# Patient Record
Sex: FEMALE | Race: BLACK OR AFRICAN AMERICAN | NOT HISPANIC OR LATINO | Employment: UNEMPLOYED | URBAN - METROPOLITAN AREA
[De-identification: names, ages, dates, MRNs, and addresses within clinical notes are randomized per-mention and may not be internally consistent; named-entity substitution may affect disease eponyms.]

---

## 2020-03-06 ENCOUNTER — HOSPITAL ENCOUNTER (OUTPATIENT)
Facility: HOSPITAL | Age: 20
Discharge: HOME | End: 2020-03-07
Attending: FAMILY MEDICINE | Admitting: INTERNAL MEDICINE
Payer: OTHER GOVERNMENT

## 2020-03-06 DIAGNOSIS — D64.9 SYMPTOMATIC ANEMIA: Primary | ICD-10-CM

## 2020-03-06 DIAGNOSIS — M32.9 LUPUS: ICD-10-CM

## 2020-03-06 DIAGNOSIS — M79.10 MYALGIA: ICD-10-CM

## 2020-03-06 PROBLEM — E87.6 HYPOKALEMIA: Status: ACTIVE | Noted: 2020-03-06

## 2020-03-06 PROBLEM — L40.9 PSORIASIS OF SCALP: Status: ACTIVE | Noted: 2020-03-06

## 2020-03-06 LAB
ABO + RH BLD: NORMAL
ALBUMIN SERPL BCP-MCNC: 3 G/DL (ref 3.5–5.2)
ALP SERPL-CCNC: 79 U/L (ref 55–135)
ALT SERPL W/O P-5'-P-CCNC: 20 U/L (ref 10–44)
ANION GAP SERPL CALC-SCNC: 12 MMOL/L (ref 8–16)
AST SERPL-CCNC: 48 U/L (ref 10–40)
B-HCG UR QL: NEGATIVE
BASOPHILS # BLD AUTO: 0.01 K/UL (ref 0–0.2)
BASOPHILS NFR BLD: 0.3 % (ref 0–1.9)
BILIRUB SERPL-MCNC: 0.2 MG/DL (ref 0.1–1)
BILIRUB UR QL STRIP: NEGATIVE
BLD GP AB SCN CELLS X3 SERPL QL: NORMAL
BLD PROD TYP BPU: NORMAL
BLOOD UNIT EXPIRATION DATE: NORMAL
BLOOD UNIT TYPE CODE: 5100
BLOOD UNIT TYPE: NORMAL
BUN SERPL-MCNC: 7 MG/DL (ref 6–20)
CALCIUM SERPL-MCNC: 8.9 MG/DL (ref 8.7–10.5)
CHLORIDE SERPL-SCNC: 104 MMOL/L (ref 95–110)
CLARITY UR: CLEAR
CO2 SERPL-SCNC: 23 MMOL/L (ref 23–29)
CODING SYSTEM: NORMAL
COLOR UR: YELLOW
CREAT SERPL-MCNC: 0.6 MG/DL (ref 0.5–1.4)
CRP SERPL-MCNC: 15.8 MG/L (ref 0–8.2)
DAT IGG-SP REAG RBC-IMP: NORMAL
DIFFERENTIAL METHOD: ABNORMAL
DISPENSE STATUS: NORMAL
EOSINOPHIL # BLD AUTO: 0 K/UL (ref 0–0.5)
EOSINOPHIL NFR BLD: 0 % (ref 0–8)
ERYTHROCYTE [DISTWIDTH] IN BLOOD BY AUTOMATED COUNT: 14.1 % (ref 11.5–14.5)
ERYTHROCYTE [SEDIMENTATION RATE] IN BLOOD BY WESTERGREN METHOD: 108 MM/HR (ref 0–20)
EST. GFR  (AFRICAN AMERICAN): >60 ML/MIN/1.73 M^2
EST. GFR  (NON AFRICAN AMERICAN): >60 ML/MIN/1.73 M^2
GLUCOSE SERPL-MCNC: 97 MG/DL (ref 70–110)
GLUCOSE UR QL STRIP: NEGATIVE
HCT VFR BLD AUTO: 25.7 % (ref 37–48.5)
HGB BLD-MCNC: 7.7 G/DL (ref 12–16)
HGB UR QL STRIP: NEGATIVE
HIV 1+2 AB+HIV1 P24 AG SERPL QL IA: NEGATIVE
IMM GRANULOCYTES # BLD AUTO: 0.03 K/UL (ref 0–0.04)
IMM GRANULOCYTES NFR BLD AUTO: 0.8 % (ref 0–0.5)
INFLUENZA A, MOLECULAR: NEGATIVE
INFLUENZA B, MOLECULAR: NEGATIVE
KETONES UR QL STRIP: NEGATIVE
LACTATE SERPL-SCNC: 0.8 MMOL/L (ref 0.5–2.2)
LEUKOCYTE ESTERASE UR QL STRIP: NEGATIVE
LYMPHOCYTES # BLD AUTO: 0.8 K/UL (ref 1–4.8)
LYMPHOCYTES NFR BLD: 22.3 % (ref 18–48)
MCH RBC QN AUTO: 27.6 PG (ref 27–31)
MCHC RBC AUTO-ENTMCNC: 30 G/DL (ref 32–36)
MCV RBC AUTO: 92 FL (ref 82–98)
MONOCYTES # BLD AUTO: 0.2 K/UL (ref 0.3–1)
MONOCYTES NFR BLD: 4 % (ref 4–15)
NEUTROPHILS # BLD AUTO: 2.7 K/UL (ref 1.8–7.7)
NEUTROPHILS NFR BLD: 72.6 % (ref 38–73)
NITRITE UR QL STRIP: NEGATIVE
NRBC BLD-RTO: 0 /100 WBC
NUM UNITS TRANS PACKED RBC: NORMAL
PH UR STRIP: 7 [PH] (ref 5–8)
PLATELET # BLD AUTO: 356 K/UL (ref 150–350)
PMV BLD AUTO: 9.7 FL (ref 9.2–12.9)
POTASSIUM SERPL-SCNC: 3.3 MMOL/L (ref 3.5–5.1)
PROCALCITONIN SERPL IA-MCNC: 0.22 NG/ML
PROT SERPL-MCNC: 8.1 G/DL (ref 6–8.4)
PROT UR QL STRIP: NEGATIVE
RBC # BLD AUTO: 2.79 M/UL (ref 4–5.4)
RETICS/RBC NFR AUTO: 1.2 % (ref 0.5–2.5)
SODIUM SERPL-SCNC: 139 MMOL/L (ref 136–145)
SP GR UR STRIP: 1.02 (ref 1–1.03)
SPECIMEN SOURCE: NORMAL
TSH SERPL DL<=0.005 MIU/L-ACNC: 2.55 UIU/ML (ref 0.4–4)
URN SPEC COLLECT METH UR: ABNORMAL
UROBILINOGEN UR STRIP-ACNC: ABNORMAL EU/DL
WBC # BLD AUTO: 3.73 K/UL (ref 3.9–12.7)

## 2020-03-06 PROCEDURE — 85651 RBC SED RATE NONAUTOMATED: CPT

## 2020-03-06 PROCEDURE — 81003 URINALYSIS AUTO W/O SCOPE: CPT

## 2020-03-06 PROCEDURE — 86038 ANTINUCLEAR ANTIBODIES: CPT

## 2020-03-06 PROCEDURE — G0378 HOSPITAL OBSERVATION PER HR: HCPCS

## 2020-03-06 PROCEDURE — 99291 CRITICAL CARE FIRST HOUR: CPT

## 2020-03-06 PROCEDURE — 36430 TRANSFUSION BLD/BLD COMPNT: CPT

## 2020-03-06 PROCEDURE — 86920 COMPATIBILITY TEST SPIN: CPT

## 2020-03-06 PROCEDURE — 86880 COOMBS TEST DIRECT: CPT

## 2020-03-06 PROCEDURE — 87502 INFLUENZA DNA AMP PROBE: CPT

## 2020-03-06 PROCEDURE — 86141 C-REACTIVE PROTEIN HS: CPT

## 2020-03-06 PROCEDURE — 85025 COMPLETE CBC W/AUTO DIFF WBC: CPT

## 2020-03-06 PROCEDURE — 87040 BLOOD CULTURE FOR BACTERIA: CPT | Mod: 59

## 2020-03-06 PROCEDURE — 36415 COLL VENOUS BLD VENIPUNCTURE: CPT

## 2020-03-06 PROCEDURE — 96361 HYDRATE IV INFUSION ADD-ON: CPT | Performed by: INTERNAL MEDICINE

## 2020-03-06 PROCEDURE — 86703 HIV-1/HIV-2 1 RESULT ANTBDY: CPT

## 2020-03-06 PROCEDURE — 86160 COMPLEMENT ANTIGEN: CPT

## 2020-03-06 PROCEDURE — 86160 COMPLEMENT ANTIGEN: CPT | Mod: 59

## 2020-03-06 PROCEDURE — 85045 AUTOMATED RETICULOCYTE COUNT: CPT

## 2020-03-06 PROCEDURE — 83605 ASSAY OF LACTIC ACID: CPT

## 2020-03-06 PROCEDURE — 63600175 PHARM REV CODE 636 W HCPCS: Performed by: INTERNAL MEDICINE

## 2020-03-06 PROCEDURE — 86161 COMPLEMENT/FUNCTION ACTIVITY: CPT

## 2020-03-06 PROCEDURE — 81025 URINE PREGNANCY TEST: CPT

## 2020-03-06 PROCEDURE — 86140 C-REACTIVE PROTEIN: CPT

## 2020-03-06 PROCEDURE — 83010 ASSAY OF HAPTOGLOBIN QUANT: CPT

## 2020-03-06 PROCEDURE — 96360 HYDRATION IV INFUSION INIT: CPT | Performed by: INTERNAL MEDICINE

## 2020-03-06 PROCEDURE — 80053 COMPREHEN METABOLIC PANEL: CPT

## 2020-03-06 PROCEDURE — 86850 RBC ANTIBODY SCREEN: CPT

## 2020-03-06 PROCEDURE — 86235 NUCLEAR ANTIGEN ANTIBODY: CPT | Mod: 59

## 2020-03-06 PROCEDURE — 86039 ANTINUCLEAR ANTIBODIES (ANA): CPT

## 2020-03-06 PROCEDURE — 84145 PROCALCITONIN (PCT): CPT

## 2020-03-06 PROCEDURE — 84443 ASSAY THYROID STIM HORMONE: CPT

## 2020-03-06 PROCEDURE — 86161 COMPLEMENT/FUNCTION ACTIVITY: CPT | Mod: 59

## 2020-03-06 PROCEDURE — P9016 RBC LEUKOCYTES REDUCED: HCPCS

## 2020-03-06 RX ORDER — DIPHENHYDRAMINE HCL 25 MG
25 CAPSULE ORAL EVERY 6 HOURS PRN
Status: DISCONTINUED | OUTPATIENT
Start: 2020-03-06 | End: 2020-03-07 | Stop reason: HOSPADM

## 2020-03-06 RX ORDER — IPRATROPIUM BROMIDE AND ALBUTEROL SULFATE 2.5; .5 MG/3ML; MG/3ML
3 SOLUTION RESPIRATORY (INHALATION) EVERY 4 HOURS PRN
Status: DISCONTINUED | OUTPATIENT
Start: 2020-03-06 | End: 2020-03-07 | Stop reason: HOSPADM

## 2020-03-06 RX ORDER — MAG HYDROX/ALUMINUM HYD/SIMETH 200-200-20
30 SUSPENSION, ORAL (FINAL DOSE FORM) ORAL EVERY 6 HOURS PRN
Status: DISCONTINUED | OUTPATIENT
Start: 2020-03-06 | End: 2020-03-07 | Stop reason: HOSPADM

## 2020-03-06 RX ORDER — SODIUM CHLORIDE 9 MG/ML
INJECTION, SOLUTION INTRAVENOUS CONTINUOUS
Status: DISCONTINUED | OUTPATIENT
Start: 2020-03-06 | End: 2020-03-07 | Stop reason: HOSPADM

## 2020-03-06 RX ORDER — GUAIFENESIN 100 MG/5ML
200 SOLUTION ORAL EVERY 4 HOURS PRN
Status: DISCONTINUED | OUTPATIENT
Start: 2020-03-06 | End: 2020-03-07 | Stop reason: HOSPADM

## 2020-03-06 RX ORDER — ACETAMINOPHEN 325 MG/1
650 TABLET ORAL EVERY 6 HOURS PRN
Status: DISCONTINUED | OUTPATIENT
Start: 2020-03-06 | End: 2020-03-07 | Stop reason: HOSPADM

## 2020-03-06 RX ORDER — SODIUM CHLORIDE 0.9 % (FLUSH) 0.9 %
10 SYRINGE (ML) INJECTION
Status: DISCONTINUED | OUTPATIENT
Start: 2020-03-06 | End: 2020-03-07 | Stop reason: HOSPADM

## 2020-03-06 RX ORDER — ONDANSETRON 2 MG/ML
4 INJECTION INTRAMUSCULAR; INTRAVENOUS EVERY 8 HOURS PRN
Status: DISCONTINUED | OUTPATIENT
Start: 2020-03-06 | End: 2020-03-07 | Stop reason: HOSPADM

## 2020-03-06 RX ORDER — HYDROCODONE BITARTRATE AND ACETAMINOPHEN 500; 5 MG/1; MG/1
TABLET ORAL
Status: DISCONTINUED | OUTPATIENT
Start: 2020-03-06 | End: 2020-03-07 | Stop reason: HOSPADM

## 2020-03-06 RX ADMIN — SODIUM CHLORIDE: 0.9 INJECTION, SOLUTION INTRAVENOUS at 10:03

## 2020-03-06 NOTE — ED PROVIDER NOTES
3/6/2020, 5:03 PM   History obtained from the patient      History of Present Illness: Dilcia Reed is a 19 y.o. female patient with PMHx of lupus who presents to the Emergency Department for fever, body aches, rash and hair loss which onset 3 weeks ago.    5:04 PM: Pt understands they will be seen and dispositioned by the next available physician. Pt voices understanding and agrees with plan. Pt also understands the longer than normal wait time. I am removing myself from the care of pt and pt will now be assigned to the next available physician.     ANTHONY Arvizu FNP-C    SCRIBE #1 NOTE: I, Vira Moss, am scribing for, and in the presence of, Belia Diehl MD. I have scribed the entire note.       History     Chief Complaint   Patient presents with    Generalized Body Aches     c/o body aches, at home fever, diffused rash, for several weeks,  patient states she was diag with Lupus at one time and then taken off the meds      Review of patient's allergies indicates:   Allergen Reactions    Sulfamethoxazole-trimethoprim Itching     syncopal episode as a child         History of Present Illness     HPI    3/6/2020, 7:02 PM  History obtained from the patient and mother      History of Present Illness: Dilcia Reed is a 19 y.o. female patient with a PMHx of Lupus who presents to the Emergency Department for evaluation of a possible lupus flare-up which onset gradually over the last few weeks. Pt reports that 6/23/19 she was diagnosed with Lupus and hospitalized in [place]. Per mother, pt recently moved to  and her medication (plaquenil) was discontinued in  because her doctor wanted to retest the pt and do their own studies. Pt states over the last few weeks her hair began to fall out and she developed a rash on her face which is new. Mother reports that the pt's Hemoglobin has slowly decreased since the beginning of this year (Hemoglobin of 10.2 (1/8/20) and 8.8 (2/5/20})). Mother also states that the pt  has very low iron. Pt's sister was dx with Lupus and exhibits similar sx. Symptoms are constant and moderate in severity. No mitigating or exacerbating factors reported. Associated sxs include generalized arthralgias/ myalgias, facial rash, hair loss, and fever (Tmax 103 F). Patient denies any chills, SOB, cough, CP, palpations, numbness, HA, dizziness, rash, wound, abdominal pain, n/v/d, back/ neck pain, sore throat, congestion, dysuria, hematuria, localized weakness, easily bruising, and all other sxs at this time. No prior tx reported. No further complaints or concerns at this time.     Arrival mode: Personal vehicle      PCP: Primary Doctor No        Past Medical History:  Past Medical History:   Diagnosis Date    Lupus        Past Surgical History:  History reviewed. No pertinent surgical history.      Family History:  Family History   Problem Relation Age of Onset    Lupus Sister        Social History:  Social History     Tobacco Use    Smoking status: Never Smoker   Substance and Sexual Activity    Alcohol use: Never     Frequency: Never    Drug use: Never    Sexual activity: Unknown         Review of Systems     Review of Systems   Constitutional: Positive for fever (Tmax 103 F). Negative for chills.        + Hair loss   HENT: Negative for congestion and sore throat.    Respiratory: Negative for cough and shortness of breath.    Cardiovascular: Negative for chest pain and palpitations.   Gastrointestinal: Negative for abdominal pain, diarrhea, nausea and vomiting.   Genitourinary: Negative for dysuria and hematuria.   Musculoskeletal: Positive for arthralgias (generalized) and myalgias (generalized). Negative for back pain and neck pain.   Skin: Positive for rash (facial). Negative for wound.   Neurological: Negative for dizziness, weakness, numbness and headaches.   Hematological: Does not bruise/bleed easily.   All other systems reviewed and are negative.     Physical Exam     Initial Vitals  [03/06/20 1623]   BP Pulse Resp Temp SpO2   127/73 (!) 112 20 98.8 °F (37.1 °C) 98 %      MAP       --          Physical Exam  Nursing Notes and Vital Signs Reviewed.  Constitutional: Patient is in no acute distress. Well-developed and well-nourished.  Head: Atraumatic. Normocephalic.  Eyes: PERRL. EOM intact. Conjunctivae are not pale. No scleral icterus.  ENT: Mucous membranes are moist. Oropharynx is clear and symmetric.    Neck: Supple. Full ROM. No lymphadenopathy.  Cardiovascular: Tachycardia. Regular rhythm. No murmurs, rubs, or gallops. Distal pulses are 2+ and symmetric.  Pulmonary/Chest: No respiratory distress. Clear to auscultation bilaterally. No wheezing or rales.  Abdominal: Soft and non-distended.  There is no tenderness.  No rebound, guarding, or rigidity. Good bowel sounds.  Genitourinary: No CVA tenderness.  Musculoskeletal: Moves all extremities. No obvious deformities. No edema. No calf tenderness.  Skin: Warm and dry. Desquamation in malar distrubution of face.   Neurological:  Alert, awake, and appropriate.  Normal speech.  No acute focal neurological deficits are appreciated.  Psychiatric: Normal affect. Good eye contact. Appropriate in content.     ED Course   Critical Care  Date/Time: 3/8/2020 3:52 PM  Performed by: Belia Diehl MD  Authorized by: Arvin Arevalo MD   Direct patient critical care time: 15 minutes  Additional history critical care time: 5 minutes  Ordering / reviewing critical care time: 5 minutes  Documentation critical care time: 5 minutes  Consulting other physicians critical care time: 5 minutes  Consult with family critical care time: 5 minutes  Other critical care time: 5 minutes  Total critical care time (exclusive of procedural time) : 45 minutes  Critical care time was exclusive of separately billable procedures and treating other patients and teaching time.  Critical care was necessary to treat or prevent imminent or life-threatening deterioration of the  "following conditions: Symptomatic anemia.  Critical care was time spent personally by me on the following activities: development of treatment plan with patient or surrogate, discussions with consultants, interpretation of cardiac output measurements, obtaining history from patient or surrogate, ordering and performing treatments and interventions, examination of patient, evaluation of patient's response to treatment, ordering and review of laboratory studies, ordering and review of radiographic studies, pulse oximetry, re-evaluation of patient's condition and review of old charts.        ED Vital Signs:  Vitals:    03/06/20 1623 03/06/20 2022 03/06/20 2216 03/06/20 2249   BP: 127/73 116/73 132/77 121/66   Pulse: (!) 112 (!) 113 (!) 113 107   Resp: 20 16 18   Temp: 98.8 °F (37.1 °C) 98.6 °F (37 °C) 98.1 °F (36.7 °C) 99.3 °F (37.4 °C)   TempSrc: Oral Oral Oral Oral   SpO2: 98% 95% 98% 99%   Weight: 67.8 kg (149 lb 5.8 oz)  72.3 kg (159 lb 6.3 oz)    Height:   5' 3" (1.6 m)     03/06/20 2305 03/07/20 0112 03/07/20 0132 03/07/20 0156   BP: 133/84 128/80 121/76 119/72   Pulse: (!) 116 108 107 105   Resp: 18 18 16 16   Temp: 98.3 °F (36.8 °C) 98.7 °F (37.1 °C) 98 °F (36.7 °C) 98.3 °F (36.8 °C)   TempSrc: Oral Oral Oral Oral   SpO2: 98% 99% 100% 98%   Weight:       Height:        03/07/20 0411 03/07/20 0801 03/07/20 1234   BP: (!) 114/59 118/73 111/65   Pulse: 92 94 107   Resp: 18 16 18   Temp: 98 °F (36.7 °C) 97.9 °F (36.6 °C) 98.1 °F (36.7 °C)   TempSrc: Oral Oral Oral   SpO2: 100% 100% 98%   Weight: 72 kg (158 lb 11.7 oz)     Height:          Abnormal Lab Results:  Labs Reviewed   CBC W/ AUTO DIFFERENTIAL - Abnormal; Notable for the following components:       Result Value    WBC 3.73 (*)     RBC 2.79 (*)     Hemoglobin 7.7 (*)     Hematocrit 25.7 (*)     Mean Corpuscular Hemoglobin Conc 30.0 (*)     Platelets 356 (*)     Immature Granulocytes 0.8 (*)     Lymph # 0.8 (*)     Mono # 0.2 (*)     All other components " within normal limits   COMPREHENSIVE METABOLIC PANEL - Abnormal; Notable for the following components:    Potassium 3.3 (*)     Albumin 3.0 (*)     AST 48 (*)     All other components within normal limits   URINALYSIS, REFLEX TO URINE CULTURE - Abnormal; Notable for the following components:    Urobilinogen, UA 2.0-3.0 (*)     All other components within normal limits    Narrative:     Preferred Collection Type->Urine, Clean Catch   INFLUENZA A & B BY MOLECULAR   HIV 1 / 2 ANTIBODY   PREGNANCY TEST, URINE RAPID   PROCALCITONIN   LACTIC ACID, PLASMA   TSH   TSH   C5 COMPLEMENT   RETICULOCYTES   SEDIMENTATION RATE   HIGH SENSITIVITY CRP   RETICULOCYTES   TYPE & SCREEN        All Lab Results:  Results for orders placed or performed during the hospital encounter of 03/06/20   Influenza A & B by Molecular   Result Value Ref Range    Influenza A, Molecular Negative Negative    Influenza B, Molecular Negative Negative    Flu A & B Source Nasal swab    Blood culture #1 **CANNOT BE ORDERED STAT**   Result Value Ref Range    Blood Culture, Routine No Growth to date     Blood Culture, Routine No Growth to date    Blood culture #2 **CANNOT BE ORDERED STAT**   Result Value Ref Range    Blood Culture, Routine No Growth to date     Blood Culture, Routine No Growth to date    HIV 1/2 Ag/Ab (4th Gen)   Result Value Ref Range    HIV 1/2 Ag/Ab Negative Negative   CBC auto differential   Result Value Ref Range    WBC 3.73 (L) 3.90 - 12.70 K/uL    RBC 2.79 (L) 4.00 - 5.40 M/uL    Hemoglobin 7.7 (L) 12.0 - 16.0 g/dL    Hematocrit 25.7 (L) 37.0 - 48.5 %    Mean Corpuscular Volume 92 82 - 98 fL    Mean Corpuscular Hemoglobin 27.6 27.0 - 31.0 pg    Mean Corpuscular Hemoglobin Conc 30.0 (L) 32.0 - 36.0 g/dL    RDW 14.1 11.5 - 14.5 %    Platelets 356 (H) 150 - 350 K/uL    MPV 9.7 9.2 - 12.9 fL    Immature Granulocytes 0.8 (H) 0.0 - 0.5 %    Gran # (ANC) 2.7 1.8 - 7.7 K/uL    Immature Grans (Abs) 0.03 0.00 - 0.04 K/uL    Lymph # 0.8 (L) 1.0 -  4.8 K/uL    Mono # 0.2 (L) 0.3 - 1.0 K/uL    Eos # 0.0 0.0 - 0.5 K/uL    Baso # 0.01 0.00 - 0.20 K/uL    nRBC 0 0 /100 WBC    Gran% 72.6 38.0 - 73.0 %    Lymph% 22.3 18.0 - 48.0 %    Mono% 4.0 4.0 - 15.0 %    Eosinophil% 0.0 0.0 - 8.0 %    Basophil% 0.3 0.0 - 1.9 %    Differential Method Automated    Comprehensive metabolic panel   Result Value Ref Range    Sodium 139 136 - 145 mmol/L    Potassium 3.3 (L) 3.5 - 5.1 mmol/L    Chloride 104 95 - 110 mmol/L    CO2 23 23 - 29 mmol/L    Glucose 97 70 - 110 mg/dL    BUN, Bld 7 6 - 20 mg/dL    Creatinine 0.6 0.5 - 1.4 mg/dL    Calcium 8.9 8.7 - 10.5 mg/dL    Total Protein 8.1 6.0 - 8.4 g/dL    Albumin 3.0 (L) 3.5 - 5.2 g/dL    Total Bilirubin 0.2 0.1 - 1.0 mg/dL    Alkaline Phosphatase 79 55 - 135 U/L    AST 48 (H) 10 - 40 U/L    ALT 20 10 - 44 U/L    Anion Gap 12 8 - 16 mmol/L    eGFR if African American >60 >60 mL/min/1.73 m^2    eGFR if non African American >60 >60 mL/min/1.73 m^2   Urinalysis, Reflex to Urine Culture Urine, Clean Catch   Result Value Ref Range    Specimen UA Urine, Clean Catch     Color, UA Yellow Yellow, Straw, [place]    Appearance, UA Clear Clear    pH, UA 7.0 5.0 - 8.0    Specific Gravity, UA 1.020 1.005 - 1.030    Protein, UA Negative Negative    Glucose, UA Negative Negative    Ketones, UA Negative Negative    Bilirubin (UA) Negative Negative    Occult Blood UA Negative Negative    Nitrite, UA Negative Negative    Urobilinogen, UA 2.0-3.0 (A) <2.0 EU/dL    Leukocytes, UA Negative Negative   Rapid Pregnancy, Urine   Result Value Ref Range    Preg Test, Ur Negative    Procalcitonin   Result Value Ref Range    Procalcitonin 0.22 <0.25 ng/mL   Lactic acid, plasma   Result Value Ref Range    Lactate (Lactic Acid) 0.8 0.5 - 2.2 mmol/L   TSH   Result Value Ref Range    TSH 2.546 0.400 - 4.000 uIU/mL   C-reactive protein   Result Value Ref Range    CRP 15.8 (H) 0.0 - 8.2 mg/L   Haptoglobin   Result Value Ref Range    Haptoglobin 288 (H) 30 - 250 mg/dL   C3  complement   Result Value Ref Range    Complement (C-3) 42 (L) 50 - 180 mg/dL   C4 complement   Result Value Ref Range    Complement (C-4) 13 11 - 44 mg/dL   High sensitivity CRP (Cardiac CRP)   Result Value Ref Range    CRP, High Sensitivity 16.65 (H) 0.00 - 3.19 mg/L   Sedimentation rate   Result Value Ref Range    Sed Rate 108 (H) 0 - 20 mm/Hr   Reticulocytes   Result Value Ref Range    Retic 1.2 0.5 - 2.5 %   CBC auto differential   Result Value Ref Range    WBC 3.93 3.90 - 12.70 K/uL    RBC 3.09 (L) 4.00 - 5.40 M/uL    Hemoglobin 8.6 (L) 12.0 - 16.0 g/dL    Hematocrit 28.5 (L) 37.0 - 48.5 %    Mean Corpuscular Volume 92 82 - 98 fL    Mean Corpuscular Hemoglobin 27.8 27.0 - 31.0 pg    Mean Corpuscular Hemoglobin Conc 30.2 (L) 32.0 - 36.0 g/dL    RDW 14.3 11.5 - 14.5 %    Platelets 331 150 - 350 K/uL    MPV 10.7 9.2 - 12.9 fL    Immature Granulocytes 0.8 (H) 0.0 - 0.5 %    Gran # (ANC) 2.9 1.8 - 7.7 K/uL    Immature Grans (Abs) 0.03 0.00 - 0.04 K/uL    Lymph # 0.8 (L) 1.0 - 4.8 K/uL    Mono # 0.2 (L) 0.3 - 1.0 K/uL    Eos # 0.0 0.0 - 0.5 K/uL    Baso # 0.00 0.00 - 0.20 K/uL    nRBC 0 0 /100 WBC    Gran% 74.8 (H) 38.0 - 73.0 %    Lymph% 19.1 18.0 - 48.0 %    Mono% 5.3 4.0 - 15.0 %    Eosinophil% 0.0 0.0 - 8.0 %    Basophil% 0.0 0.0 - 1.9 %    Differential Method Automated    Magnesium   Result Value Ref Range    Magnesium 1.6 1.6 - 2.6 mg/dL   Comprehensive metabolic panel   Result Value Ref Range    Sodium 142 136 - 145 mmol/L    Potassium 3.4 (L) 3.5 - 5.1 mmol/L    Chloride 105 95 - 110 mmol/L    CO2 24 23 - 29 mmol/L    Glucose 87 70 - 110 mg/dL    BUN, Bld 6 6 - 20 mg/dL    Creatinine 0.6 0.5 - 1.4 mg/dL    Calcium 8.3 (L) 8.7 - 10.5 mg/dL    Total Protein 7.3 6.0 - 8.4 g/dL    Albumin 2.8 (L) 3.5 - 5.2 g/dL    Total Bilirubin 0.2 0.1 - 1.0 mg/dL    Alkaline Phosphatase 72 55 - 135 U/L    AST 53 (H) 10 - 40 U/L    ALT 18 10 - 44 U/L    Anion Gap 13 8 - 16 mmol/L    eGFR if African American >60 >60 mL/min/1.73  m^2    eGFR if non African American >60 >60 mL/min/1.73 m^2   Type & Screen   Result Value Ref Range    Group & Rh O POS     Indirect Nicole NEG    Direct antiglobulin test   Result Value Ref Range    Direct Nicole (UMA) NEG    Prepare RBC 1 Unit   Result Value Ref Range    UNIT NUMBER [id number]     Product Code C6355O56     DISPENSE STATUS TRANSFUSED     CODING SYSTEM [id number]     Unit Blood Type Code 5100     Unit Blood Type O POS     Unit Expiration [id number]        Imaging Results:  Imaging Results          X-Ray Chest AP Portable (Final result)  Result time 03/06/20 19:35:17    Final result by Ivett Ritter MD (Timothy) (03/06/20 19:35:17)                 Impression:      Negative single view chest x-ray.      Electronically signed by: Ivett Ritter MD  Date:    03/06/2020  Time:    19:35             Narrative:    EXAMINATION:  XR CHEST AP PORTABLE    CLINICAL HISTORY:  <Diagnosis>, myalgia;    COMPARISON:  None    FINDINGS:  Heart size is normal. The lung fields are clear. No acute cardiopulmonary infiltrate.                                        The Emergency Provider reviewed the vital signs and test results, which are outlined above.     ED Discussion     8:00 PM: Discussed pt's case with Dr. Roya Harden (Rheumatology) admit the pt and get a reticulocyte, perry, haptoglobin, complement level, ESR CRP, and CHIKIS with reflex panel and agrees with PRBC transfusion. Recommends not starting patient on steroids at this time until she sees ESR and CRP.    8:30 PM: Discussed case with Dr. Gallegos ([facility] Medicine). Dr. Gallegos agrees with current care and management of pt and accepts admission and also requests that labs be put in that were requested of rheumotology  Admitting Service: Hospital medicine  Admitting Physician: Dr. Gallegos  Admit to: OBS Med tele               Medical Decision Making:   Clinical Tests:   Lab Tests: Ordered and Reviewed  Radiological Study: Ordered and Reviewed  ED  "Management:  This is a pleasant 19-year-old  female who was previously diagnosed with lupus who presented to the emergency department for "lupus flare".  Patient and mother at bedside states that they are new to the area and that the patient was previously treated with prednisone and Plaquenil in 2019 however with her new rheumatologist/PCP, she was stopped of all of her medications and wanted to "make sure she has lupus with retesting".  Everything was stopped in the past 4-5 months.  States that in the past few weeks she started to have a rash that developed on her face, losing hair, weakness fatigue and extreme myalgias/arthralgias.  Patient noted that this is similar to her previous lupus flare for which was hospitalized in 2019.  Patient was able to provide hospitalization documentation from last year however this is limited as there are no physician notes and these are all from [place].  It was noted however that patient has been significantly dropping her hemoglobin within the past few months and today it is 7.7.  Patient was tachycardic in the emergency department and as clinically my plan was to treat patient for symptomatic anemia and discussed the case with Rheumatology on-call.  I was able to to discussed the case with Dr. Harden, rheumatology on-call, who agreed with treating the patient's anemia and as it is difficult for her to decipher whether the patient does have clinical lupus, she recommended admission to at least treat the anemia as this would help a lupus flare irregardless.  She she also recommended multiple other blood tests and at this time recommended that to not start the patient on any steroids until ESR and CRP are back.  I have discussed the case with hospital medicine who agreed to admission.  Patient was stable upon admission and blood transfusion and consent was signed and started in the emergency department.  Patient and mother were in agreement of clinical course " "and action and verbalized her understanding.  All questions were answered appropriately.           ED Medication(s):  Medications - No data to display    Discharge Medication List as of 3/7/2020  4:15 PM      START taking these medications    Details   hydroxychloroquine (PLAQUENIL) 200 mg tablet Take 1 tablet (200 mg total) by mouth 2 (two) times daily., Starting Sat 3/7/2020, Until Fri 6/5/2020, Normal      predniSONE (DELTASONE) 20 MG tablet Take 1 tablet (20 mg total) by mouth once daily., Starting Sat 3/7/2020, Until Wed 5/6/2020, Normal             Follow-up Information     Roya Harden MD In 1 week.    Specialties:  Rheumatology, Internal Medicine  Why:  Hospital follow up Lupus flare with symptomatic anemia.  Contact information:  [address] [place] [address] DR Salbador FERRER [id number] [phone]                       Scribe Attestation:   Scribe #1: I performed the above scribed service and the documentation accurately describes the services I performed. I attest to the accuracy of the note.     Attending:   Physician Attestation Statement for Scribe #1: I, Belia Diehl MD, personally performed the services described in this documentation, as scribed by Vira Moss, in my presence, and it is both accurate and complete.           Clinical Impression       ICD-10-CM ICD-9-CM   1. Symptomatic anemia D64.9 285.9   2. Myalgia M79.10 729.1   3. Lupus M32.9 710.0       Disposition:   Disposition: Placed in Observation  Condition: Fair   Portions of this note may have been created with voice recognition software. Occasional "wrong-word" or "sound-a-like" substitutions may have occurred due to the inherent limitations of voice recognition software. Please, read the note carefully and recognize, using context, where substitutions have occurred.            Belia Diehl MD  03/08/20 1558    "

## 2020-03-07 VITALS
DIASTOLIC BLOOD PRESSURE: 65 MMHG | HEART RATE: 107 BPM | OXYGEN SATURATION: 98 % | SYSTOLIC BLOOD PRESSURE: 111 MMHG | RESPIRATION RATE: 18 BRPM | HEIGHT: 63 IN | TEMPERATURE: 98 F | WEIGHT: 158.75 LBS | BODY MASS INDEX: 28.13 KG/M2

## 2020-03-07 LAB
ALBUMIN SERPL BCP-MCNC: 2.8 G/DL (ref 3.5–5.2)
ALP SERPL-CCNC: 72 U/L (ref 55–135)
ALT SERPL W/O P-5'-P-CCNC: 18 U/L (ref 10–44)
ANION GAP SERPL CALC-SCNC: 13 MMOL/L (ref 8–16)
AST SERPL-CCNC: 53 U/L (ref 10–40)
BASOPHILS # BLD AUTO: 0 K/UL (ref 0–0.2)
BASOPHILS NFR BLD: 0 % (ref 0–1.9)
BILIRUB SERPL-MCNC: 0.2 MG/DL (ref 0.1–1)
BUN SERPL-MCNC: 6 MG/DL (ref 6–20)
C3 SERPL-MCNC: 42 MG/DL (ref 50–180)
C4 SERPL-MCNC: 13 MG/DL (ref 11–44)
CALCIUM SERPL-MCNC: 8.3 MG/DL (ref 8.7–10.5)
CHLORIDE SERPL-SCNC: 105 MMOL/L (ref 95–110)
CO2 SERPL-SCNC: 24 MMOL/L (ref 23–29)
CREAT SERPL-MCNC: 0.6 MG/DL (ref 0.5–1.4)
CRP SERPL-MCNC: 16.65 MG/L (ref 0–3.19)
DIFFERENTIAL METHOD: ABNORMAL
EOSINOPHIL # BLD AUTO: 0 K/UL (ref 0–0.5)
EOSINOPHIL NFR BLD: 0 % (ref 0–8)
ERYTHROCYTE [DISTWIDTH] IN BLOOD BY AUTOMATED COUNT: 14.3 % (ref 11.5–14.5)
EST. GFR  (AFRICAN AMERICAN): >60 ML/MIN/1.73 M^2
EST. GFR  (NON AFRICAN AMERICAN): >60 ML/MIN/1.73 M^2
GLUCOSE SERPL-MCNC: 87 MG/DL (ref 70–110)
HAPTOGLOB SERPL-MCNC: 288 MG/DL (ref 30–250)
HCT VFR BLD AUTO: 28.5 % (ref 37–48.5)
HGB BLD-MCNC: 8.6 G/DL (ref 12–16)
IMM GRANULOCYTES # BLD AUTO: 0.03 K/UL (ref 0–0.04)
IMM GRANULOCYTES NFR BLD AUTO: 0.8 % (ref 0–0.5)
LYMPHOCYTES # BLD AUTO: 0.8 K/UL (ref 1–4.8)
LYMPHOCYTES NFR BLD: 19.1 % (ref 18–48)
MAGNESIUM SERPL-MCNC: 1.6 MG/DL (ref 1.6–2.6)
MCH RBC QN AUTO: 27.8 PG (ref 27–31)
MCHC RBC AUTO-ENTMCNC: 30.2 G/DL (ref 32–36)
MCV RBC AUTO: 92 FL (ref 82–98)
MONOCYTES # BLD AUTO: 0.2 K/UL (ref 0.3–1)
MONOCYTES NFR BLD: 5.3 % (ref 4–15)
NEUTROPHILS # BLD AUTO: 2.9 K/UL (ref 1.8–7.7)
NEUTROPHILS NFR BLD: 74.8 % (ref 38–73)
NRBC BLD-RTO: 0 /100 WBC
PLATELET # BLD AUTO: 331 K/UL (ref 150–350)
PMV BLD AUTO: 10.7 FL (ref 9.2–12.9)
POTASSIUM SERPL-SCNC: 3.4 MMOL/L (ref 3.5–5.1)
PROT SERPL-MCNC: 7.3 G/DL (ref 6–8.4)
RBC # BLD AUTO: 3.09 M/UL (ref 4–5.4)
SODIUM SERPL-SCNC: 142 MMOL/L (ref 136–145)
WBC # BLD AUTO: 3.93 K/UL (ref 3.9–12.7)

## 2020-03-07 PROCEDURE — 63600175 PHARM REV CODE 636 W HCPCS: Performed by: INTERNAL MEDICINE

## 2020-03-07 PROCEDURE — 96361 HYDRATE IV INFUSION ADD-ON: CPT | Performed by: INTERNAL MEDICINE

## 2020-03-07 PROCEDURE — 36415 COLL VENOUS BLD VENIPUNCTURE: CPT

## 2020-03-07 PROCEDURE — 25000003 PHARM REV CODE 250: Performed by: INTERNAL MEDICINE

## 2020-03-07 PROCEDURE — 85025 COMPLETE CBC W/AUTO DIFF WBC: CPT

## 2020-03-07 PROCEDURE — 80053 COMPREHEN METABOLIC PANEL: CPT

## 2020-03-07 PROCEDURE — 83735 ASSAY OF MAGNESIUM: CPT

## 2020-03-07 PROCEDURE — G0378 HOSPITAL OBSERVATION PER HR: HCPCS

## 2020-03-07 PROCEDURE — 25000003 PHARM REV CODE 250: Performed by: NURSE PRACTITIONER

## 2020-03-07 RX ORDER — POTASSIUM CHLORIDE 20 MEQ/1
20 TABLET, EXTENDED RELEASE ORAL 3 TIMES DAILY
Status: DISCONTINUED | OUTPATIENT
Start: 2020-03-07 | End: 2020-03-07 | Stop reason: HOSPADM

## 2020-03-07 RX ORDER — FERROUS SULFATE 325(65) MG
325 TABLET, DELAYED RELEASE (ENTERIC COATED) ORAL DAILY
Status: DISCONTINUED | OUTPATIENT
Start: 2020-03-07 | End: 2020-03-07 | Stop reason: HOSPADM

## 2020-03-07 RX ORDER — PREDNISONE 20 MG/1
20 TABLET ORAL DAILY
Status: DISCONTINUED | OUTPATIENT
Start: 2020-03-07 | End: 2020-03-07 | Stop reason: HOSPADM

## 2020-03-07 RX ORDER — HYDROXYCHLOROQUINE SULFATE 200 MG/1
200 TABLET, FILM COATED ORAL 2 TIMES DAILY
Status: DISCONTINUED | OUTPATIENT
Start: 2020-03-07 | End: 2020-03-07 | Stop reason: HOSPADM

## 2020-03-07 RX ORDER — PREDNISONE 20 MG/1
20 TABLET ORAL DAILY
Qty: 30 TABLET | Refills: 1 | Status: SHIPPED | OUTPATIENT
Start: 2020-03-07 | End: 2020-03-24

## 2020-03-07 RX ORDER — HYDROXYCHLOROQUINE SULFATE 200 MG/1
200 TABLET, FILM COATED ORAL 2 TIMES DAILY
Qty: 60 TABLET | Refills: 2 | Status: SHIPPED | OUTPATIENT
Start: 2020-03-07 | End: 2020-03-24 | Stop reason: SDUPTHER

## 2020-03-07 RX ADMIN — SODIUM CHLORIDE: 0.9 INJECTION, SOLUTION INTRAVENOUS at 09:03

## 2020-03-07 RX ADMIN — PREDNISONE 20 MG: 20 TABLET ORAL at 04:03

## 2020-03-07 RX ADMIN — POTASSIUM CHLORIDE 20 MEQ: 20 TABLET, EXTENDED RELEASE ORAL at 09:03

## 2020-03-07 RX ADMIN — FERROUS SULFATE TAB EC 325 MG (65 MG FE EQUIVALENT) 325 MG: 325 (65 FE) TABLET DELAYED RESPONSE at 02:03

## 2020-03-07 RX ADMIN — HYDROXYCHLOROQUINE SULFATE 200 MG: 200 TABLET, FILM COATED ORAL at 04:03

## 2020-03-07 RX ADMIN — POTASSIUM CHLORIDE 20 MEQ: 20 TABLET, EXTENDED RELEASE ORAL at 02:03

## 2020-03-07 NOTE — ASSESSMENT & PLAN NOTE
Hemoglobin 7.7, likely secondary to lupus.  No evidence of bleeding.  Transfuse 1 unit PRBC.  Repeat labs in a.m..

## 2020-03-07 NOTE — HPI
Ms. Reed is a young 19-year-old  female with PMH significant for lupus, diagnosed early last year in [place], has moved to the [facility] late last year.  Patient was taken off of Plaquenil for the past 3-4 months by a provider at [facility] facility.  Patient presents to the ED today complaining of scalp lesions, pruritic in nature, associated with rash on her cheeks, nasal bridge, bilateral orbital regions, as well as migratory polyarthralgias of bilateral wrists/metacarpal joints, bilateral knees, bilateral lower extremities, bilateral ankles.  Patient has not seen a rheumatologist since moving to this area.  Not on any steroids.  Not on any chronic medications.  She reported subjective fever at home.  In the ED laboratory workup reveals mild neutropenia (WBC 3.7), hemoglobin 7.7, hematocrit 25.7, MCV 92, platelets 356.  Baseline hemoglobin is around 10.1 in January 2020 from outside hospital.  Except low potassium of 3.3, rest of the laboratory workup is otherwise unremarkable.  X-ray without in infiltrates, masses or effusions.  HIV negative.  Influenza negative.  Dr. Diehl  discussed case with Dr. Harden with Rheumatology. Recommended obtaining ESR, CRP, C3, C4, CHIKIS.  Recommended holding off on steroids until evaluated by Rheumatology in a.m..    Admitting diagnosis lupus flare versus others (including psoriatic arthritis/dermatitis).

## 2020-03-07 NOTE — NURSING
Received orders to discharge pt, will d/c iv per policy, will make followup appt with rheumatologist, will instruct pt to  rx at local pharmacy      Unable to fit pt in to see rheumatologist until June, put pt on waitlist and instructed pt to follow up and call clinic number

## 2020-03-07 NOTE — PLAN OF CARE
Remains free from injury. Denies pain. Fluids and blood running as ordered. Vital signs stable. Chart reviewed. Will continue to monitor.

## 2020-03-07 NOTE — SUBJECTIVE & OBJECTIVE
Past Medical History:   Diagnosis Date    Lupus        History reviewed. No pertinent surgical history.    Review of patient's allergies indicates:   Allergen Reactions    Sulfamethoxazole-trimethoprim        No current facility-administered medications on file prior to encounter.      Current Outpatient Medications on File Prior to Encounter   Medication Sig    ferrous sulfate (IRON ORAL) Take by mouth.     Family History     Problem Relation (Age of Onset)    Lupus Sister        Tobacco Use    Smoking status: Never Smoker    Smokeless tobacco: Never Used   Substance and Sexual Activity    Alcohol use: Never     Frequency: Never    Drug use: Never    Sexual activity: Not on file     Review of Systems   Constitutional: Positive for appetite change. Negative for chills, diaphoresis, fatigue and fever.   HENT: Negative.  Negative for congestion, nosebleeds and sinus pressure.    Eyes: Negative.  Negative for visual disturbance.   Respiratory: Negative.  Negative for cough, chest tightness, shortness of breath and wheezing.    Cardiovascular: Negative.  Negative for chest pain, palpitations and leg swelling.   Gastrointestinal: Negative.  Negative for abdominal pain, diarrhea, nausea and vomiting.   Endocrine: Negative.  Negative for polyuria.   Genitourinary: Negative.  Negative for dysuria, flank pain, frequency and urgency.   Musculoskeletal: Positive for arthralgias (Bilateral wrists, bilateral knees, bilateral ankles). Negative for back pain, joint swelling and neck stiffness.   Skin: Positive for color change and rash. Negative for pallor.   Allergic/Immunologic: Negative.  Negative for immunocompromised state.   Neurological: Negative.  Negative for dizziness, syncope, speech difficulty, numbness and headaches.   Hematological: Negative.  Negative for adenopathy. Does not bruise/bleed easily.   Psychiatric/Behavioral: Negative.  Negative for confusion, decreased concentration and hallucinations. The  patient is not nervous/anxious.    All other systems reviewed and are negative.    Objective:     Vital Signs (Most Recent):  Temp: 98.1 °F (36.7 °C) (03/06/20 2216)  Pulse: (!) 113 (03/06/20 2216)  Resp: 16 (03/06/20 2216)  BP: 132/77 (03/06/20 2216)  SpO2: 98 % (03/06/20 2216) Vital Signs (24h Range):  Temp:  [98.1 °F (36.7 °C)-98.8 °F (37.1 °C)] 98.1 °F (36.7 °C)  Pulse:  [112-113] 113  Resp:  [16-20] 16  SpO2:  [95 %-98 %] 98 %  BP: (116-132)/(73-77) 132/77     Weight: 67.8 kg (149 lb 5.8 oz)  There is no height or weight on file to calculate BMI.    Physical Exam   Constitutional: She is oriented to person, place, and time. She appears well-developed and well-nourished. No distress.   HENT:   Head: Atraumatic.   Entire scalp covered with whitish psoriatic type lesions, dry, scaly, pleuritic.  Lesions extending to the forehead, bilateral orbital areas, nasal bridge and upper part of the cheeks.   Eyes: Conjunctivae and EOM are normal. No scleral icterus.   Neck: Normal range of motion. Neck supple. No tracheal deviation present. No thyromegaly present.   Cardiovascular: Regular rhythm, normal heart sounds and intact distal pulses. Tachycardia present.   No murmur heard.  Pulmonary/Chest: Effort normal and breath sounds normal. No respiratory distress. She has no wheezes. She has no rales. She exhibits no tenderness.   Abdominal: Soft. Bowel sounds are normal. She exhibits no distension. There is no tenderness.   Musculoskeletal: Normal range of motion. She exhibits no edema, tenderness or deformity.   Neurological: She is alert and oriented to person, place, and time. No cranial nerve deficit. She exhibits normal muscle tone. Coordination normal.   Skin: Skin is warm and dry. She is not diaphoretic. There is erythema (Whitish psoriatic type lesions on the entire scalp, upper part of the face).   Psychiatric: She has a normal mood and affect. Her behavior is normal. Thought content normal.   Nursing note and  vitals reviewed.        CRANIAL NERVES     CN III, IV, VI   Extraocular motions are normal.        Significant Labs:   BMP:   Recent Labs   Lab 03/06/20  1720   GLU 97      K 3.3*      CO2 23   BUN 7   CREATININE 0.6   CALCIUM 8.9     CBC:   Recent Labs   Lab 03/06/20  1720   WBC 3.73*   HGB 7.7*   HCT 25.7*   *     CMP:   Recent Labs   Lab 03/06/20  1720      K 3.3*      CO2 23   GLU 97   BUN 7   CREATININE 0.6   CALCIUM 8.9   PROT 8.1   ALBUMIN 3.0*   BILITOT 0.2   ALKPHOS 79   AST 48*   ALT 20   ANIONGAP 12   EGFRNONAA >60     TSH:   Recent Labs   Lab 03/06/20  1720   TSH 2.546     All pertinent labs within the past 24 hours have been reviewed.    Significant Imaging: I have reviewed and interpreted all pertinent imaging results/findings within the past 24 hours.     Imaging Results          X-Ray Chest AP Portable (Final result)  Result time 03/06/20 19:35:17    Final result by Ivett Ritter MD (Timothy) (03/06/20 19:35:17)                 Impression:      Negative single view chest x-ray.      Electronically signed by: Ivett Ritter MD  Date:    03/06/2020  Time:    19:35             Narrative:    EXAMINATION:  XR CHEST AP PORTABLE    CLINICAL HISTORY:  <Diagnosis>, myalgia;    COMPARISON:  None    FINDINGS:  Heart size is normal. The lung fields are clear. No acute cardiopulmonary infiltrate.                              I have independently reviewed all pertinent labs within the past 24 hours.    I have independently reviewed, visualized and interpreted all pertinent imaging results within the past 24 hours and discussed the findings with the ED physician, Dr. Diehl.

## 2020-03-07 NOTE — ASSESSMENT & PLAN NOTE
Was taken off Plaquenil few months ago at outside facility.  Dr. Harden with Rheumatology consulted, will follow recommendations.  C3, C4, ESR, CRP, CHIKIS ordered.  Per rheumatology recommendations, hold off on steroids at this time.

## 2020-03-07 NOTE — ASSESSMENT & PLAN NOTE
Whitish psoriatic type lesions covering entire scalp, with forehead and nasal bridge involvement.    Pruritic in nature.  Benadryl as needed.  Rheumatology consult.  Patient has not been on any medications recently (was taken off Plaquenil few months ago)

## 2020-03-07 NOTE — H&P
[facility] Hospital Medicine  History & Physical    Patient Name: Dilcia Reed  MRN: [id number]  Admission Date: 3/6/2020  Attending Physician: Ivan Gallegos MD  Primary Care Provider: No primary care provider on file.         Patient information was obtained from patient, parent, past medical records and ER records.     Subjective:     Principal Problem:Symptomatic anemia    Chief Complaint:   Chief Complaint   Patient presents with    Generalized Body Aches     c/o body aches, at home fever, diffused rash, for several weeks,  patient states she was diag with Lupus at one time and then taken off the meds         HPI: Ms. Reed is a young 19-year-old  female with PMH significant for lupus, diagnosed early last year in [place], has moved to the [facility] late last year.  Patient was taken off of Plaquenil for the past 3-4 months by a provider at [facility] facility.  Patient presents to the ED today complaining of scalp lesions, pruritic in nature, associated with rash on her cheeks, nasal bridge, bilateral orbital regions, as well as migratory polyarthralgias of bilateral wrists/metacarpal joints, bilateral knees, bilateral lower extremities, bilateral ankles.  Patient has not seen a rheumatologist since moving to this area.  Not on any steroids.  Not on any chronic medications.  She reported subjective fever at home.  In the ED laboratory workup reveals mild neutropenia (WBC 3.7), hemoglobin 7.7, hematocrit 25.7, MCV 92, platelets 356.  Baseline hemoglobin is around 10.1 in January 2020 from outside hospital.  Except low potassium of 3.3, rest of the laboratory workup is otherwise unremarkable.  X-ray without in infiltrates, masses or effusions.  HIV negative.  Influenza negative.  Dr. Diehl  discussed case with Dr. Harden with Rheumatology. Recommended obtaining ESR, CRP, C3, C4, CHIKIS.  Recommended holding off on steroids until evaluated by Rheumatology in a.m..    Admitting  diagnosis lupus flare versus others (including psoriatic arthritis/dermatitis).      Past Medical History:   Diagnosis Date    Lupus        History reviewed. No pertinent surgical history.    Review of patient's allergies indicates:   Allergen Reactions    Sulfamethoxazole-trimethoprim        No current facility-administered medications on file prior to encounter.      Current Outpatient Medications on File Prior to Encounter   Medication Sig    ferrous sulfate (IRON ORAL) Take by mouth.     Family History     Problem Relation (Age of Onset)    Lupus Sister        Tobacco Use    Smoking status: Never Smoker    Smokeless tobacco: Never Used   Substance and Sexual Activity    Alcohol use: Never     Frequency: Never    Drug use: Never    Sexual activity: Not on file     Review of Systems   Constitutional: Positive for appetite change. Negative for chills, diaphoresis, fatigue and fever.   HENT: Negative.  Negative for congestion, nosebleeds and sinus pressure.    Eyes: Negative.  Negative for visual disturbance.   Respiratory: Negative.  Negative for cough, chest tightness, shortness of breath and wheezing.    Cardiovascular: Negative.  Negative for chest pain, palpitations and leg swelling.   Gastrointestinal: Negative.  Negative for abdominal pain, diarrhea, nausea and vomiting.   Endocrine: Negative.  Negative for polyuria.   Genitourinary: Negative.  Negative for dysuria, flank pain, frequency and urgency.   Musculoskeletal: Positive for arthralgias (Bilateral wrists, bilateral knees, bilateral ankles). Negative for back pain, joint swelling and neck stiffness.   Skin: Positive for color change and rash. Negative for pallor.   Allergic/Immunologic: Negative.  Negative for immunocompromised state.   Neurological: Negative.  Negative for dizziness, syncope, speech difficulty, numbness and headaches.   Hematological: Negative.  Negative for adenopathy. Does not bruise/bleed easily.   Psychiatric/Behavioral:  Negative.  Negative for confusion, decreased concentration and hallucinations. The patient is not nervous/anxious.    All other systems reviewed and are negative.    Objective:     Vital Signs (Most Recent):  Temp: 98.1 °F (36.7 °C) (03/06/20 2216)  Pulse: (!) 113 (03/06/20 2216)  Resp: 16 (03/06/20 2216)  BP: 132/77 (03/06/20 2216)  SpO2: 98 % (03/06/20 2216) Vital Signs (24h Range):  Temp:  [98.1 °F (36.7 °C)-98.8 °F (37.1 °C)] 98.1 °F (36.7 °C)  Pulse:  [112-113] 113  Resp:  [16-20] 16  SpO2:  [95 %-98 %] 98 %  BP: (116-132)/(73-77) 132/77     Weight: 67.8 kg (149 lb 5.8 oz)  There is no height or weight on file to calculate BMI.    Physical Exam   Constitutional: She is oriented to person, place, and time. She appears well-developed and well-nourished. No distress.   HENT:   Head: Atraumatic.   Entire scalp covered with whitish psoriatic type lesions, dry, scaly, pleuritic.  Lesions extending to the forehead, bilateral orbital areas, nasal bridge and upper part of the cheeks.   Eyes: Conjunctivae and EOM are normal. No scleral icterus.   Neck: Normal range of motion. Neck supple. No tracheal deviation present. No thyromegaly present.   Cardiovascular: Regular rhythm, normal heart sounds and intact distal pulses. Tachycardia present.   No murmur heard.  Pulmonary/Chest: Effort normal and breath sounds normal. No respiratory distress. She has no wheezes. She has no rales. She exhibits no tenderness.   Abdominal: Soft. Bowel sounds are normal. She exhibits no distension. There is no tenderness.   Musculoskeletal: Normal range of motion. She exhibits no edema, tenderness or deformity.   Neurological: She is alert and oriented to person, place, and time. No cranial nerve deficit. She exhibits normal muscle tone. Coordination normal.   Skin: Skin is warm and dry. She is not diaphoretic. There is erythema (Whitish psoriatic type lesions on the entire scalp, upper part of the face).   Psychiatric: She has a normal mood  and affect. Her behavior is normal. Thought content normal.   Nursing note and vitals reviewed.        CRANIAL NERVES     CN III, IV, VI   Extraocular motions are normal.        Significant Labs:   BMP:   Recent Labs   Lab 03/06/20  1720   GLU 97      K 3.3*      CO2 23   BUN 7   CREATININE 0.6   CALCIUM 8.9     CBC:   Recent Labs   Lab 03/06/20  1720   WBC 3.73*   HGB 7.7*   HCT 25.7*   *     CMP:   Recent Labs   Lab 03/06/20  1720      K 3.3*      CO2 23   GLU 97   BUN 7   CREATININE 0.6   CALCIUM 8.9   PROT 8.1   ALBUMIN 3.0*   BILITOT 0.2   ALKPHOS 79   AST 48*   ALT 20   ANIONGAP 12   EGFRNONAA >60     TSH:   Recent Labs   Lab 03/06/20  1720   TSH 2.546     All pertinent labs within the past 24 hours have been reviewed.    Significant Imaging: I have reviewed and interpreted all pertinent imaging results/findings within the past 24 hours.     Imaging Results          X-Ray Chest AP Portable (Final result)  Result time 03/06/20 19:35:17    Final result by Ivett Ritter MD (Timothy) (03/06/20 19:35:17)                 Impression:      Negative single view chest x-ray.      Electronically signed by: Ivett Ritter MD  Date:    03/06/2020  Time:    19:35             Narrative:    EXAMINATION:  XR CHEST AP PORTABLE    CLINICAL HISTORY:  <Diagnosis>, myalgia;    COMPARISON:  None    FINDINGS:  Heart size is normal. The lung fields are clear. No acute cardiopulmonary infiltrate.                              I have independently reviewed all pertinent labs within the past 24 hours.    I have independently reviewed, visualized and interpreted all pertinent imaging results within the past 24 hours and discussed the findings with the ED physician, Dr. Diehl.            Assessment/Plan:     * Symptomatic anemia  Hemoglobin 7.7, likely secondary to lupus.  No evidence of bleeding.  Transfuse 1 unit PRBC.  Repeat labs in a.m..      Psoriasis of scalp  Whitish psoriatic type lesions covering  entire scalp, with forehead and nasal bridge involvement.    Pruritic in nature.  Benadryl as needed.  Rheumatology consult.  Patient has not been on any medications recently (was taken off Plaquenil few months ago)      Lupus  Was taken off Plaquenil few months ago at outside facility.  Dr. Harden with Rheumatology consulted, will follow recommendations.  C3, C4, ESR, CRP, CHIKIS ordered.  Per rheumatology recommendations, hold off on steroids at this time.        Hypokalemia  Potassium 3.3.  Replace with KCl 40 mEq p.o. x1.        VTE Risk Mitigation (From admission, onward)         Ordered     IP VTE LOW RISK PATIENT  Once      03/06/20 2212     Place sequential compression device  Until discontinued      03/06/20 2049                   Ivan Gallegos MD  Department of Hospital Medicine   [facility]

## 2020-03-07 NOTE — ED NOTES
Pt resting in ER stretcher, aaox4, rr e/u, NAD noted.  Bed low and locked, call light in reach, side rails up x2.  Family at the bedside.  Pt verbalized understanding of status and POC; denies further needs. Will continue to monitor.

## 2020-03-07 NOTE — PLAN OF CARE
Discussed poc with pt, vs wnl, no acute distress noted, remains injury free, fall precautions in place, labs r/t anemia being closely monitored, received one unit of blood yesterday, labs improving    Chart check complete

## 2020-03-08 NOTE — HOSPITAL COURSE
Placed in observation for evaluation and treatment of symptomatic anemia.  Transfused 1 unit PRBC with symptomatic improvement.  Labwork drawn to evaluate for hemolysis and inflammation.  Her haptoglobin and bilirubin were normal.  ESR and CRP were significantly elevated.  CHIKIS not resulted at time of discharge.  Reviewed records which showed previously very high Anti-dsDNA and CHIKIS consistent with Lupus.  Discussed over the phone with on-call Rheumatologist.  Discharge plan start Prednisone 20 mg daily and twice daily Plaquenil 200 mg.  Follow up in clinic with Rheumatology soon.

## 2020-03-08 NOTE — DISCHARGE SUMMARY
[facility] Hospital Medicine  Discharge Summary      Patient Name: Dilcia Reed  MRN: [id number]  Admission Date: 3/6/2020  Hospital Length of Stay: 0 days  Discharge Date and Time: 3/7/2020  4:20 PM  Attending Physician:  Arvin Arevalo MD  Discharging Provider: Arvin Arevalo MD  Primary Care Provider: Tim Singh MD      HPI:   Ms. Reed is a young 19-year-old  female with PMH significant for lupus, diagnosed early last year in [place], has moved to the [facility] late last year.  Patient was taken off of Plaquenil for the past 3-4 months by a provider at [facility] facility.  Patient presents to the ED today complaining of scalp lesions, pruritic in nature, associated with rash on her cheeks, nasal bridge, bilateral orbital regions, as well as migratory polyarthralgias of bilateral wrists/metacarpal joints, bilateral knees, bilateral lower extremities, bilateral ankles.  Patient has not seen a rheumatologist since moving to this area.  Not on any steroids.  Not on any chronic medications.  She reported subjective fever at home.  In the ED laboratory workup reveals mild neutropenia (WBC 3.7), hemoglobin 7.7, hematocrit 25.7, MCV 92, platelets 356.  Baseline hemoglobin is around 10.1 in January 2020 from outside hospital.  Except low potassium of 3.3, rest of the laboratory workup is otherwise unremarkable.  X-ray without in infiltrates, masses or effusions.  HIV negative.  Influenza negative.  Dr. Diehl  discussed case with Dr. Harden with Rheumatology. Recommended obtaining ESR, CRP, C3, C4, CHIKIS.  Recommended holding off on steroids until evaluated by Rheumatology in a.m..    Admitting diagnosis lupus flare versus others (including psoriatic arthritis/dermatitis).      * No surgery found *      Hospital Course:   Placed in observation for evaluation and treatment of symptomatic anemia.  Transfused 1 unit PRBC with symptomatic improvement.  Labwork drawn to evaluate  for hemolysis and inflammation.  Her haptoglobin and bilirubin were normal.  ESR and CRP were significantly elevated.  CHIKIS not resulted at time of discharge.  Reviewed records which showed previously very high Anti-dsDNA and CHIKIS consistent with Lupus.  Discussed over the phone with on-call Rheumatologist.  Discharge plan start Prednisone 20 mg daily and twice daily Plaquenil 200 mg.  Follow up in clinic with Rheumatology soon.     Consults:   Consults (From admission, onward)        Status Ordering Provider     Inpatient consult to Rheumatology  Once     Provider:  Roya Harden MD    Acknowledged [facility]          No new Assessment & Plan notes have been filed under this hospital service since the last note was generated.  Service: Hospital Medicine    Final Active Diagnoses:    Diagnosis Date Noted POA    PRINCIPAL PROBLEM:  Symptomatic anemia [D64.9] 03/06/2020 Yes    Lupus [M32.9] 03/06/2020 Yes    Hypokalemia [E87.6] 03/06/2020 Yes    Psoriasis of scalp [L40.9] 03/06/2020 Yes      Problems Resolved During this Admission:       Discharged Condition: good    Disposition: Home or Self Care    Follow Up:  Follow-up Information     Roya Harden MD In 1 week.    Specialties:  Rheumatology, Internal Medicine  Why:  Hospital follow up Lupus flare with symptomatic anemia.  Contact information:  [address] [place] [address] [address] [address] [phone]                 Patient Instructions:      Diet Adult Regular     Activity as tolerated       Significant Diagnostic Studies: Labs: All labs within the past 24 hours have been reviewed    Pending Diagnostic Studies:     Procedure Component Value Units Date/Time    CHIKIS [[id number]] Collected:  03/06/20 2115    Order Status:  Sent Lab Status:  In process Updated:  03/07/20 0217    Specimen:  Blood     Complement, Alternate Pathway (AH50) [[id number]] Collected:  03/06/20 2115    Order Status:  Sent Lab Status:  In process Updated:  03/07/20 1250     Specimen:  Blood          Medications:  Reconciled Home Medications:      Medication List      START taking these medications    hydroxychloroquine 200 mg tablet  Commonly known as:  PLAQUENIL  Take 1 tablet (200 mg total) by mouth 2 (two) times daily.     predniSONE 20 MG tablet  Commonly known as:  DELTASONE  Take 1 tablet (20 mg total) by mouth once daily.        CONTINUE taking these medications    IRON ORAL  Take by mouth.            Indwelling Lines/Drains at time of discharge:   Lines/Drains/Airways     None                 Time spent on the discharge of patient: 30 minutes  Patient was seen and examined on the date of discharge and determined to be suitable for discharge.         Arvin Arevalo MD  Department of Hospital Medicine  [facility]

## 2020-03-09 ENCOUNTER — TELEPHONE (OUTPATIENT)
Dept: RHEUMATOLOGY | Facility: CLINIC | Age: 20
End: 2020-03-09

## 2020-03-09 NOTE — TELEPHONE ENCOUNTER
Ok to overbook with anyone irrespective of the insurance type since she is an internal referral. Thanks.

## 2020-03-09 NOTE — TELEPHONE ENCOUNTER
Spoke with pt and scheduled appointment with Dr. MAURICIO for 3.11.20 at 1.15 pm. Pt verbalized understanding

## 2020-03-09 NOTE — TELEPHONE ENCOUNTER
----- Message from Roya Harden MD sent at 3/9/2020  1:12 PM CDT -----  Ran Germain     This patient is scheduled to see Dr. MAURICIO in June, however she was recently in the ED/hospital, and discharged over the weekend  Can you please check if she can be seen early, if not Dr. MAURICIO schedule whoever has the earliest appointment    Thanks   Roya Harden

## 2020-03-10 ENCOUNTER — TELEPHONE (OUTPATIENT)
Dept: RHEUMATOLOGY | Facility: CLINIC | Age: 20
End: 2020-03-10

## 2020-03-10 LAB
ANA SER QL IF: POSITIVE
ANA TITR SER IF: NORMAL {TITER}
COMPLEMENT C5.FUNCTIONAL [UNITS/VOLUME] IN SERUM OR PLASMA: 47 U/ML (ref 29–53)

## 2020-03-10 NOTE — TELEPHONE ENCOUNTER
Spoke with pt and confirmed appointment with Dr. Barker for 3.11.20 at 1.15 pm at [facility].

## 2020-03-11 ENCOUNTER — LAB VISIT (OUTPATIENT)
Dept: LAB | Facility: HOSPITAL | Age: 20
End: 2020-03-11
Attending: INTERNAL MEDICINE
Payer: OTHER GOVERNMENT

## 2020-03-11 ENCOUNTER — LAB VISIT (OUTPATIENT)
Dept: LAB | Facility: HOSPITAL | Age: 20
End: 2020-03-11
Payer: OTHER GOVERNMENT

## 2020-03-11 ENCOUNTER — OFFICE VISIT (OUTPATIENT)
Dept: RHEUMATOLOGY | Facility: CLINIC | Age: 20
End: 2020-03-11
Payer: OTHER GOVERNMENT

## 2020-03-11 VITALS
RESPIRATION RATE: 16 BRPM | HEART RATE: 83 BPM | DIASTOLIC BLOOD PRESSURE: 59 MMHG | WEIGHT: 152.31 LBS | SYSTOLIC BLOOD PRESSURE: 118 MMHG | HEIGHT: 63 IN | BODY MASS INDEX: 26.99 KG/M2

## 2020-03-11 DIAGNOSIS — L93.0 DISCOID LUPUS ERYTHEMATOSUS: ICD-10-CM

## 2020-03-11 DIAGNOSIS — M32.19 OTHER SYSTEMIC LUPUS ERYTHEMATOSUS WITH OTHER ORGAN INVOLVEMENT: ICD-10-CM

## 2020-03-11 DIAGNOSIS — D59.19 OTHER AUTOIMMUNE HEMOLYTIC ANEMIAS: ICD-10-CM

## 2020-03-11 DIAGNOSIS — R79.89 ABNORMAL LIVER FUNCTION TESTS: ICD-10-CM

## 2020-03-11 DIAGNOSIS — M32.19 OTHER SYSTEMIC LUPUS ERYTHEMATOSUS WITH OTHER ORGAN INVOLVEMENT: Primary | ICD-10-CM

## 2020-03-11 PROBLEM — M32.8 OTHER FORMS OF SYSTEMIC LUPUS ERYTHEMATOSUS: Status: ACTIVE | Noted: 2020-03-06

## 2020-03-11 LAB
ALBUMIN SERPL BCP-MCNC: 3.2 G/DL (ref 3.5–5.2)
ALP SERPL-CCNC: 74 U/L (ref 55–135)
ALT SERPL W/O P-5'-P-CCNC: 19 U/L (ref 10–44)
ANION GAP SERPL CALC-SCNC: 7 MMOL/L (ref 8–16)
ANTI SM ANTIBODY: 4.12 RATIO (ref 0–0.99)
ANTI SM/RNP ANTIBODY: 7.42 RATIO (ref 0–0.99)
ANTI-SM INTERPRETATION: POSITIVE
ANTI-SM/RNP INTERPRETATION: POSITIVE
ANTI-SSA ANTIBODY: 3.26 RATIO (ref 0–0.99)
ANTI-SSA INTERPRETATION: POSITIVE
ANTI-SSB ANTIBODY: 0.3 RATIO (ref 0–0.99)
ANTI-SSB INTERPRETATION: NEGATIVE
AST SERPL-CCNC: 27 U/L (ref 10–40)
BASOPHILS # BLD AUTO: 0.01 K/UL (ref 0–0.2)
BASOPHILS NFR BLD: 0.2 % (ref 0–1.9)
BILIRUB SERPL-MCNC: 0.2 MG/DL (ref 0.1–1)
BILIRUB UR QL STRIP: NEGATIVE
BUN SERPL-MCNC: 8 MG/DL (ref 6–20)
CALCIUM SERPL-MCNC: 9 MG/DL (ref 8.7–10.5)
CHLORIDE SERPL-SCNC: 108 MMOL/L (ref 95–110)
CK SERPL-CCNC: 78 U/L (ref 20–180)
CLARITY UR: CLEAR
CO2 SERPL-SCNC: 28 MMOL/L (ref 23–29)
COLOR UR: YELLOW
CREAT SERPL-MCNC: 0.6 MG/DL (ref 0.5–1.4)
CRP SERPL-MCNC: 2.2 MG/L (ref 0–8.2)
DIFFERENTIAL METHOD: ABNORMAL
DSDNA AB SER-ACNC: ABNORMAL [IU]/ML
EOSINOPHIL # BLD AUTO: 0.3 K/UL (ref 0–0.5)
EOSINOPHIL NFR BLD: 3.9 % (ref 0–8)
ERYTHROCYTE [DISTWIDTH] IN BLOOD BY AUTOMATED COUNT: 14.8 % (ref 11.5–14.5)
ERYTHROCYTE [SEDIMENTATION RATE] IN BLOOD BY WESTERGREN METHOD: 59 MM/HR (ref 0–36)
EST. GFR  (AFRICAN AMERICAN): >60 ML/MIN/1.73 M^2
EST. GFR  (NON AFRICAN AMERICAN): >60 ML/MIN/1.73 M^2
GLUCOSE SERPL-MCNC: 88 MG/DL (ref 70–110)
GLUCOSE UR QL STRIP: NEGATIVE
HCT VFR BLD AUTO: 30.9 % (ref 37–48.5)
HGB BLD-MCNC: 9.6 G/DL (ref 12–16)
HGB UR QL STRIP: NEGATIVE
IMM GRANULOCYTES # BLD AUTO: 0.05 K/UL (ref 0–0.04)
IMM GRANULOCYTES NFR BLD AUTO: 0.8 % (ref 0–0.5)
KETONES UR QL STRIP: NEGATIVE
LEUKOCYTE ESTERASE UR QL STRIP: NEGATIVE
LYMPHOCYTES # BLD AUTO: 0.9 K/UL (ref 1–4.8)
LYMPHOCYTES NFR BLD: 13.5 % (ref 18–48)
MCH RBC QN AUTO: 28.8 PG (ref 27–31)
MCHC RBC AUTO-ENTMCNC: 31.1 G/DL (ref 32–36)
MCV RBC AUTO: 93 FL (ref 82–98)
MONOCYTES # BLD AUTO: 0.3 K/UL (ref 0.3–1)
MONOCYTES NFR BLD: 4.4 % (ref 4–15)
NEUTROPHILS # BLD AUTO: 5 K/UL (ref 1.8–7.7)
NEUTROPHILS NFR BLD: 78 % (ref 38–73)
NITRITE UR QL STRIP: NEGATIVE
NRBC BLD-RTO: 0 /100 WBC
PH UR STRIP: 6 [PH] (ref 5–8)
PLATELET # BLD AUTO: 410 K/UL (ref 150–350)
PMV BLD AUTO: 9.9 FL (ref 9.2–12.9)
POTASSIUM SERPL-SCNC: 3.2 MMOL/L (ref 3.5–5.1)
PROT SERPL-MCNC: 8.2 G/DL (ref 6–8.4)
PROT UR QL STRIP: ABNORMAL
RBC # BLD AUTO: 3.33 M/UL (ref 4–5.4)
SODIUM SERPL-SCNC: 143 MMOL/L (ref 136–145)
SP GR UR STRIP: >=1.03 (ref 1–1.03)
URN SPEC COLLECT METH UR: ABNORMAL
WBC # BLD AUTO: 6.37 K/UL (ref 3.9–12.7)

## 2020-03-11 PROCEDURE — 82550 ASSAY OF CK (CPK): CPT

## 2020-03-11 PROCEDURE — 82085 ASSAY OF ALDOLASE: CPT

## 2020-03-11 PROCEDURE — 86235 NUCLEAR ANTIGEN ANTIBODY: CPT

## 2020-03-11 PROCEDURE — 83516 IMMUNOASSAY NONANTIBODY: CPT | Mod: 59

## 2020-03-11 PROCEDURE — 86140 C-REACTIVE PROTEIN: CPT

## 2020-03-11 PROCEDURE — 99205 PR OFFICE/OUTPT VISIT, NEW, LEVL V, 60-74 MIN: ICD-10-PCS | Mod: S$PBB,,, | Performed by: INTERNAL MEDICINE

## 2020-03-11 PROCEDURE — 86704 HEP B CORE ANTIBODY TOTAL: CPT

## 2020-03-11 PROCEDURE — 86146 BETA-2 GLYCOPROTEIN ANTIBODY: CPT

## 2020-03-11 PROCEDURE — 80053 COMPREHEN METABOLIC PANEL: CPT

## 2020-03-11 PROCEDURE — 99213 OFFICE O/P EST LOW 20 MIN: CPT | Mod: PBBFAC | Performed by: INTERNAL MEDICINE

## 2020-03-11 PROCEDURE — 99999 PR PBB SHADOW E&M-EST. PATIENT-LVL III: ICD-10-PCS | Mod: PBBFAC,,, | Performed by: INTERNAL MEDICINE

## 2020-03-11 PROCEDURE — 81003 URINALYSIS AUTO W/O SCOPE: CPT

## 2020-03-11 PROCEDURE — 86160 COMPLEMENT ANTIGEN: CPT | Mod: 59

## 2020-03-11 PROCEDURE — 86706 HEP B SURFACE ANTIBODY: CPT

## 2020-03-11 PROCEDURE — 85025 COMPLETE CBC W/AUTO DIFF WBC: CPT

## 2020-03-11 PROCEDURE — 85613 RUSSELL VIPER VENOM DILUTED: CPT

## 2020-03-11 PROCEDURE — 36415 COLL VENOUS BLD VENIPUNCTURE: CPT

## 2020-03-11 PROCEDURE — 86160 COMPLEMENT ANTIGEN: CPT

## 2020-03-11 PROCEDURE — 83520 IMMUNOASSAY QUANT NOS NONAB: CPT

## 2020-03-11 PROCEDURE — 86147 CARDIOLIPIN ANTIBODY EA IG: CPT | Mod: 59

## 2020-03-11 PROCEDURE — 87340 HEPATITIS B SURFACE AG IA: CPT

## 2020-03-11 PROCEDURE — 86803 HEPATITIS C AB TEST: CPT

## 2020-03-11 PROCEDURE — 99999 PR PBB SHADOW E&M-EST. PATIENT-LVL III: CPT | Mod: PBBFAC,,, | Performed by: INTERNAL MEDICINE

## 2020-03-11 PROCEDURE — 82570 ASSAY OF URINE CREATININE: CPT

## 2020-03-11 PROCEDURE — 99205 OFFICE O/P NEW HI 60 MIN: CPT | Mod: S$PBB,,, | Performed by: INTERNAL MEDICINE

## 2020-03-11 PROCEDURE — 85652 RBC SED RATE AUTOMATED: CPT

## 2020-03-11 RX ORDER — ACETAMINOPHEN 325 MG/1
650 TABLET ORAL
Status: CANCELLED | OUTPATIENT
Start: 2020-03-11

## 2020-03-11 RX ORDER — SODIUM CHLORIDE 0.9 % (FLUSH) 0.9 %
10 SYRINGE (ML) INJECTION
Status: CANCELLED | OUTPATIENT
Start: 2020-03-11

## 2020-03-11 RX ORDER — CLOBETASOL PROPIONATE 0.05 G/100ML
SHAMPOO TOPICAL
COMMUNITY
Start: 2020-03-06

## 2020-03-11 RX ORDER — HEPARIN 100 UNIT/ML
500 SYRINGE INTRAVENOUS
Status: CANCELLED | OUTPATIENT
Start: 2020-03-11

## 2020-03-11 RX ORDER — KETOCONAZOLE 20 MG/ML
SHAMPOO, SUSPENSION TOPICAL
COMMUNITY
Start: 2020-03-06 | End: 2021-02-12

## 2020-03-11 RX ORDER — FAMOTIDINE 10 MG/ML
20 INJECTION INTRAVENOUS
Status: CANCELLED | OUTPATIENT
Start: 2020-03-11

## 2020-03-11 NOTE — ASSESSMENT & PLAN NOTE
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  On 10 mg daily prednisone and Plaquenil for the past 6 months.  Extremely elevated inflammatory markers, low complement levels and active lupus on examination with synovitis around bilateral elbows, shoulders and ankles.  No synovitis over PIP or MCP joints.  Consider CellCept after rituximab induction therapy.

## 2020-03-11 NOTE — ASSESSMENT & PLAN NOTE
Discoid lupus erythematosus.  Flare-up since stopping Plaquenil.  Recently restarted Plaquenil.  Referral sent to Dermatology.

## 2020-03-11 NOTE — PROGRESS NOTES
RHEUMATOLOGY CLINIC initial VISIT  Chief complaints:-  Lupus treatment    HPI:-  Dilcia Quintero a 19 y.o. pleasant female comes in for a follow up visit.  She was recently seen in hospital for lupus flare-up symptoms and was referred here to establish care.  She lived in [place] a until recently when she moved here to live with her mom.  She was diagnosed with lupus last year and was started on prednisone and Plaquenil.  She has never been on any medication like CellCept, Imuran or methotrexate.  She complains of pain and joint swelling around ankles, shoulders and ankles.  She has chronic fatigue and muscle weakness.  She also has severe skin rash involving the scalp with extensive scaling and was diagnosed with psoriasis.  She also gets but to flare rash in the face after prolonged sun exposure from lupus.  Recently her fatigue have been significantly worse and was found to have severe anemia.  She denies any history of menorrhalgia.  No blood in urine.  No Raynaud's phenomenon or sicca syndrome.    Review of Systems   Constitutional: Negative for chills and fever.   HENT: Negative for congestion and sore throat.    Eyes: Negative for blurred vision and redness.   Respiratory: Negative for cough and shortness of breath.    Cardiovascular: Negative for chest pain and leg swelling.   Gastrointestinal: Negative for abdominal pain.   Genitourinary: Negative for dysuria.   Musculoskeletal: Positive for back pain, joint pain, myalgias and neck pain. Negative for falls.   Skin: Negative for rash.   Neurological: Negative for headaches.   Endo/Heme/Allergies: Does not bruise/bleed easily.   Psychiatric/Behavioral: Negative for memory loss. The patient does not have insomnia.        Past Medical History:   Diagnosis Date    Lupus        History reviewed. No pertinent surgical history.     Social History     Tobacco Use    Smoking status: Never Smoker    Smokeless  "tobacco: Never Used   Substance Use Topics    Alcohol use: Never     Frequency: Never    Drug use: Never       Family History   Problem Relation Age of Onset    Lupus Sister        Review of patient's allergies indicates:   Allergen Reactions    Sulfamethoxazole-trimethoprim Itching     syncopal episode as a child       Vitals:    03/11/20 1307   BP: (!) 118/59   Pulse: 83   Resp: 16   Weight: 69.1 kg (152 lb 5.4 oz)   Height: 5' 3" (1.6 m)   PainSc: 0-No pain       Physical Exam   Constitutional: She is oriented to person, place, and time and well-developed, well-nourished, and in no distress. No distress.   HENT:   Head: Normocephalic.   Mouth/Throat: Oropharynx is clear and moist.   Eyes: Pupils are equal, round, and reactive to light. Conjunctivae and EOM are normal.   Neck: Normal range of motion. Neck supple.   Cardiovascular: Normal rate and intact distal pulses.   Pulmonary/Chest: Effort normal. No respiratory distress.   Abdominal: Soft. There is no tenderness.   Musculoskeletal:   Effusion over bilateral elbows, shoulders and ankles.  No effusion over PIP or MCP joints. 4/5 proximal muscle weakness bilateral upper and lower   Neurological: She is alert and oriented to person, place, and time. No cranial nerve deficit.   Skin: Skin is warm. Rash noted. There is erythema.   Significant psoriatic patches over the scalp.  Discoid lupus over the malar area.   Psychiatric: Mood and affect normal.   Nursing note and vitals reviewed.      Medication List with Changes/Refills   Current Medications    CLOBETASOL (CLOBEX) 0.05 % SHAMPOO        FERROUS SULFATE (IRON ORAL)    Take by mouth.    HYDROXYCHLOROQUINE (PLAQUENIL) 200 MG TABLET    Take 1 tablet (200 mg total) by mouth 2 (two) times daily.    KETOCONAZOLE (NIZORAL) 2 % SHAMPOO        PREDNISONE (DELTASONE) 20 MG TABLET    Take 1 tablet (20 mg total) by mouth once daily.       Assessment/Plans:-  1. Other systemic lupus erythematosus with other organ " involvement    2. Other autoimmune hemolytic anemias    3. Discoid lupus erythematosus    4. Abnormal liver function tests      Problem List Items Addressed This Visit        Derm    Discoid lupus erythematosus    Current Assessment & Plan     Discoid lupus erythematosus.  Flare-up since stopping Plaquenil.  Recently restarted Plaquenil.  Referral sent to Dermatology.         Relevant Orders    Hepatitis B core antibody, total    Hepatitis B surface antibody    Hepatitis B Surface Antigen    Hepatitis C Antibody    C3 complement    C4 complement    Sedimentation rate    C-reactive protein    Cardiolipin antibody    Beta-2 glycoprotein Abs (IgA, IgG, IgM)    DRVVT    CBC auto differential    Comprehensive metabolic panel    Protein / creatinine ratio, urine    Urinalysis    Ambulatory referral/consult to Dermatology       Immunology/Multi System    Other forms of systemic lupus erythematosus - Primary    Current Assessment & Plan     Systemic lupus erythematosus with arthritis, leukopenia, anemia, thrombocytosis, discoid lupus, probable myositis, extreme photosensitivity.  On 10 mg daily prednisone and Plaquenil for the past 6 months.  Extremely elevated inflammatory markers, low complement levels and active lupus on examination with synovitis around bilateral elbows, shoulders and ankles.  No synovitis over PIP or MCP joints.  Consider CellCept after rituximab induction therapy.         Relevant Orders    Hepatitis B core antibody, total    Hepatitis B surface antibody    Hepatitis B Surface Antigen    Hepatitis C Antibody    C3 complement    C4 complement    Sedimentation rate    C-reactive protein    Cardiolipin antibody    Beta-2 glycoprotein Abs (IgA, IgG, IgM)    DRVVT    CBC auto differential    Comprehensive metabolic panel    Protein / creatinine ratio, urine    Urinalysis    Ambulatory referral/consult to Dermatology    Aldolase    CK    MyoMarker Panel 3       Oncology    Other autoimmune hemolytic anemias     Current Assessment & Plan     Lupus induced anemia with some leukopenia.  Worsening hemoglobin levels.  Try rituximab to prevent  long-term prednisone induced avascular necrosis         Relevant Orders    Hepatitis B core antibody, total    Hepatitis B surface antibody    Hepatitis B Surface Antigen    Hepatitis C Antibody    C3 complement    C4 complement    Sedimentation rate    C-reactive protein    Cardiolipin antibody    Beta-2 glycoprotein Abs (IgA, IgG, IgM)    DRVVT    CBC auto differential    Comprehensive metabolic panel    Protein / creatinine ratio, urine    Urinalysis    Ambulatory referral/consult to Dermatology       GI    Abnormal liver function tests    Current Assessment & Plan     With her severe lupus I am suspecting that she might have underlying myositis causing CK elevation and muscle transaminitis elevation.  Check muscle enzymes along with myositis antibody panel.             # Follow up in about 2 weeks (around 3/25/2020).      Disclaimer: This note was prepared using voice recognition system and is likely to have sound alike errors and is not proof read.  Please call me with any questions.

## 2020-03-11 NOTE — ASSESSMENT & PLAN NOTE
Lupus induced anemia with some leukopenia.  Worsening hemoglobin levels.  Try rituximab to prevent  long-term prednisone induced avascular necrosis

## 2020-03-11 NOTE — LETTER
March 11, 2020      Arvin Arevalo MD  [address] [place] [address] Dr  [facility]  [place] [address]           [facility]  [address] [facility] [place] [address]  Phone: [phone]  Fax: [phone]          Patient: Dilcia Reed   MR Number: [id number]   YOB: 2000   Date of Visit: 3/11/2020       Dear Dr. Arvin Arevalo:    Thank you for referring Dilcia Reed to me for evaluation. Attached you will find relevant portions of my assessment and plan of care.    If you have questions, please do not hesitate to call me. I look forward to following Dilcia Reed along with you.    Sincerely,    Magan Barker MD    Enclosure  CC:  No Recipients    If you would like to receive this communication electronically, please contact [email or web address] or [phone] to request more information on [employer] Link access.    For providers and/or their staff who would like to refer a patient to [facility], please contact us through our one-stop-shop provider referral line, [facility], at [phone].    If you feel you have received this communication in error or would no longer like to receive these types of communications, please e-mail [email or web address]

## 2020-03-11 NOTE — ASSESSMENT & PLAN NOTE
With her severe lupus I am suspecting that she might have underlying myositis causing CK elevation and muscle transaminitis elevation.  Check muscle enzymes along with myositis antibody panel.

## 2020-03-12 LAB
AH50 ACT/NOR SER IA: <10 %OF NORM
BACTERIA BLD CULT: NORMAL
BACTERIA BLD CULT: NORMAL
C3 SERPL-MCNC: 41 MG/DL (ref 50–180)
C4 SERPL-MCNC: 9 MG/DL (ref 11–44)
CREAT UR-MCNC: 184 MG/DL (ref 15–325)
HBV CORE AB SERPL QL IA: NEGATIVE
HBV SURFACE AB SER-ACNC: NEGATIVE M[IU]/ML
HBV SURFACE AG SERPL QL IA: NEGATIVE
HCV AB SERPL QL IA: NEGATIVE
LA PPP-IMP: NEGATIVE
PROT UR-MCNC: 27 MG/DL (ref 0–15)
PROT/CREAT UR: 0.15 MG/G{CREAT} (ref 0–0.2)

## 2020-03-13 LAB
ALDOLASE SERPL-CCNC: 4.8 U/L (ref 1.2–7.6)
CARDIOLIPIN IGG SER IA-ACNC: <9.4 GPL (ref 0–14.99)
CARDIOLIPIN IGM SER IA-ACNC: 24.14 MPL (ref 0–12.49)

## 2020-03-15 LAB
B2 GLYCOPROT1 IGA SER QL: 46 SAU
B2 GLYCOPROT1 IGG SER QL: <9 SGU
B2 GLYCOPROT1 IGM SER QL: 17 SMU

## 2020-03-16 ENCOUNTER — OFFICE VISIT (OUTPATIENT)
Dept: DERMATOLOGY | Facility: CLINIC | Age: 20
End: 2020-03-16
Payer: OTHER GOVERNMENT

## 2020-03-16 ENCOUNTER — TELEPHONE (OUTPATIENT)
Dept: DERMATOLOGY | Facility: CLINIC | Age: 20
End: 2020-03-16

## 2020-03-16 DIAGNOSIS — L40.9 PSORIASIS OF SCALP: ICD-10-CM

## 2020-03-16 DIAGNOSIS — M32.8 OTHER FORMS OF SYSTEMIC LUPUS ERYTHEMATOSUS, UNSPECIFIED ORGAN INVOLVEMENT STATUS: Primary | ICD-10-CM

## 2020-03-16 PROCEDURE — 99202 OFFICE O/P NEW SF 15 MIN: CPT | Mod: S$PBB,,, | Performed by: DERMATOLOGY

## 2020-03-16 PROCEDURE — 99999 PR PBB SHADOW E&M-EST. PATIENT-LVL II: CPT | Mod: PBBFAC,,, | Performed by: DERMATOLOGY

## 2020-03-16 PROCEDURE — 99212 OFFICE O/P EST SF 10 MIN: CPT | Mod: PBBFAC | Performed by: DERMATOLOGY

## 2020-03-16 PROCEDURE — 99999 PR PBB SHADOW E&M-EST. PATIENT-LVL II: ICD-10-PCS | Mod: PBBFAC,,, | Performed by: DERMATOLOGY

## 2020-03-16 PROCEDURE — 99202 PR OFFICE/OUTPT VISIT, NEW, LEVL II, 15-29 MIN: ICD-10-PCS | Mod: S$PBB,,, | Performed by: DERMATOLOGY

## 2020-03-16 RX ORDER — BETAMETHASONE DIPROPIONATE 0.5 MG/G
CREAM TOPICAL 2 TIMES DAILY PRN
Qty: 45 G | Refills: 3 | Status: SHIPPED | OUTPATIENT
Start: 2020-03-16

## 2020-03-16 RX ORDER — FLUOCINOLONE ACETONIDE 0.11 MG/ML
1 OIL TOPICAL NIGHTLY
Qty: 118 ML | Refills: 5 | Status: SHIPPED | OUTPATIENT
Start: 2020-03-16

## 2020-03-16 RX ORDER — HYDROCORTISONE 25 MG/G
CREAM TOPICAL 2 TIMES DAILY PRN
Qty: 30 G | Refills: 3 | Status: SHIPPED | OUTPATIENT
Start: 2020-03-16

## 2020-03-16 NOTE — PROGRESS NOTES
Subjective:       Patient ID:  Dilcia Reed is a 20 y.o. female who presents for   Chief Complaint   Patient presents with    Psoriasis     c/o psoriasis to scalp x several months     History of Present Illness: The patient presents with chief complaint of lupus with skin manifestations and psoriasis  Location: lupus-arms, face; psoriasis scalp  Duration: several months scalp, diagnosed with lupus about one year ago  Signs/Symptoms: dry, itchy and flaky  Following diagnosis of SLE, patient was started on prednisone and plaquenil. After moving to [place] a few months ago, this medicine was stopped and she began to flare with join pains, swelling and skin eruption.   She was recently seen here by Rheumatology at [facility] and prednisone and plaquenil restarted and plan to start Rituximab. Additionally, she was given clobetasol shampoo and ketoconazole shampoo.         Review of Systems   Constitutional: Negative for fever.   Skin: Positive for itching and rash.        Objective:    Physical Exam   Constitutional: She appears well-developed and well-nourished.   Neurological: She is alert and oriented to person, place, and time.   Psychiatric: She has a normal mood and affect.   Skin:   Areas Examined (abnormalities noted in diagram):   Scalp / Hair Palpated and Inspected  Head / Face Inspection Performed  Neck Inspection Performed  Chest / Axilla Inspection Performed  Abdomen Inspection Performed  Back Inspection Performed  RUE Inspected  LUE Inspection Performed  RLE Inspected  LLE Inspection Performed                   Diagram Legend     Erythematous scaling macule/papule c/w actinic keratosis       Vascular papule c/w angioma      Pigmented verrucoid papule/plaque c/w seborrheic keratosis      Yellow umbilicated papule c/w sebaceous hyperplasia      Irregularly shaped tan macule c/w lentigo     1-2 mm smooth white papules consistent with Milia      Movable subcutaneous cyst with punctum c/w epidermal inclusion  cyst      Subcutaneous movable cyst c/w pilar cyst      Firm pink to brown papule c/w dermatofibroma      Pedunculated fleshy papule(s) c/w skin tag(s)      Evenly pigmented macule c/w junctional nevus     Mildly variegated pigmented, slightly irregular-bordered macule c/w mildly atypical nevus      Flesh colored to evenly pigmented papule c/w intradermal nevus       Pink pearly papule/plaque c/w basal cell carcinoma      Erythematous hyperkeratotic cursted plaque c/w SCC      Surgical scar with no sign of skin cancer recurrence      Open and closed comedones      Inflammatory papules and pustules      Verrucoid papule consistent consistent with wart     Erythematous eczematous patches and plaques     Dystrophic onycholytic nail with subungual debris c/w onychomycosis     Umbilicated papule    Erythematous-base heme-crusted tan verrucoid plaque consistent with inflamed seborrheic keratosis     Erythematous Silvery Scaling Plaque c/w Psoriasis     See annotation      Assessment / Plan:        Systemic lupus erythematosus with discoid lesions of the skin  - now back on prednisone and plaquenil per Rheum  - will be starting Rituximab shortly  - these systemic medications should help with cutaneous manifestations  START:  -     betamethasone dipropionate (DIPROLENE) 0.05 % cream; Apply topically 2 (two) times daily as needed. For lupus  Dispense: 45 g; Refill: 3  -     hydrocortisone 2.5 % cream; Apply topically 2 (two) times daily as needed. For lupus affecting face.  Dispense: 30 g; Refill: 3      Psoriasis of scalp  - unclear if scalp and neck disease a severe manifestation of cutaneous lupus or true psoriasis--nonetheless, local treatment is the same  START:  -     fluocinolone and shower cap (DERMA-SMOOTHE/FS SCALP OIL) 0.01 % Oil; Apply 1 application topically every evening.  Dispense: 118 mL; Refill: 5               Follow up in about 3 months (around 6/16/2020).

## 2020-03-16 NOTE — TELEPHONE ENCOUNTER
----- Message from Sandra Burgess sent at 3/16/2020  4:42 PM CDT -----  Contact: pt   Pt is at her pharm and her rx is not there and is calling to see where her med was called in to [phone]//sofi/suzie    pt is waiting    [facility] [address] [place] [address]  Phone: [phone] Fax: [phone]

## 2020-03-16 NOTE — LETTER
March 17, 2020      No Recipients           [facility]  [address]  [place] [address]  Phone: [phone]  Fax: [phone]          Patient: Dilcia Reed   MR Number: [id number]   YOB: 2000   Date of Visit: 3/16/2020       Dear :    Thank you for referring Dilcia Reed to me for evaluation. Attached you will find relevant portions of my assessment and plan of care.    If you have questions, please do not hesitate to call me. I look forward to following Dilcia Reed along with you.    Sincerely,    Jeovany Amaya MD    Enclosure  CC:  No Recipients    If you would like to receive this communication electronically, please contact [email or web address] or [phone] to request more information on [email or web address] Link access.    For providers and/or their staff who would like to refer a patient to [facility], please contact us through our one-stop-shop provider referral line, [facility], at [phone].    If you feel you have received this communication in error or would no longer like to receive these types of communications, please e-mail [email or web address]

## 2020-03-16 NOTE — TELEPHONE ENCOUNTER
Returned call to pt and informed her to check with her pharmacy tomorrow morning for her rx.  Pt verbalized understanding to all information given and had no further questions.

## 2020-03-19 ENCOUNTER — TELEPHONE (OUTPATIENT)
Dept: RHEUMATOLOGY | Facility: CLINIC | Age: 20
End: 2020-03-19

## 2020-03-24 ENCOUNTER — TELEPHONE (OUTPATIENT)
Dept: RHEUMATOLOGY | Facility: CLINIC | Age: 20
End: 2020-03-24

## 2020-03-24 ENCOUNTER — OFFICE VISIT (OUTPATIENT)
Dept: RHEUMATOLOGY | Facility: CLINIC | Age: 20
End: 2020-03-24
Payer: OTHER GOVERNMENT

## 2020-03-24 DIAGNOSIS — D59.19 OTHER AUTOIMMUNE HEMOLYTIC ANEMIAS: ICD-10-CM

## 2020-03-24 DIAGNOSIS — M32.9 SYSTEMIC LUPUS ERYTHEMATOSUS ARTHRITIS: ICD-10-CM

## 2020-03-24 DIAGNOSIS — M32.19 OTHER SYSTEMIC LUPUS ERYTHEMATOSUS WITH OTHER ORGAN INVOLVEMENT: Primary | ICD-10-CM

## 2020-03-24 DIAGNOSIS — L93.0 DISCOID LUPUS ERYTHEMATOSUS: ICD-10-CM

## 2020-03-24 PROCEDURE — 99214 OFFICE O/P EST MOD 30 MIN: CPT | Mod: 95,,, | Performed by: INTERNAL MEDICINE

## 2020-03-24 PROCEDURE — 99214 PR OFFICE/OUTPT VISIT, EST, LEVL IV, 30-39 MIN: ICD-10-PCS | Mod: 95,,, | Performed by: INTERNAL MEDICINE

## 2020-03-24 RX ORDER — HYDROXYCHLOROQUINE SULFATE 200 MG/1
200 TABLET, FILM COATED ORAL 2 TIMES DAILY
Qty: 180 TABLET | Refills: 2 | Status: SHIPPED | OUTPATIENT
Start: 2020-03-24 | End: 2020-12-30 | Stop reason: SDUPTHER

## 2020-03-24 RX ORDER — PREDNISONE 10 MG/1
10 TABLET ORAL DAILY
Qty: 90 TABLET | Refills: 1 | Status: SHIPPED | OUTPATIENT
Start: 2020-03-24 | End: 2020-05-26

## 2020-03-24 NOTE — PATIENT INSTRUCTIONS
I have sent your prescription to specialty pharmacy to get approval from your insurance company. Once approved, they will contact you to ship you the medication. If you do not hear from them within 7 working days, please call them at [phone].

## 2020-03-24 NOTE — PROGRESS NOTES
The patient location is:  home.  The chief complaint leading to consultation is:  Lupus  Visit type: Virtual visit with synchronous audio and video  Total time spent with patient:  20  Each patient to whom he or she provides medical services by telemedicine is:  (1) informed of the relationship between the physician and patient and the respective role of any other health care provider with respect to management of the patient; and (2) notified that he or she may decline to receive medical services by telemedicine and may withdraw from such care at any time.    Notes:                                                      RHEUMATOLOGY CLINIC FOLLOW UP VISIT  Chief complaints:-  To follow up for lupus    HPI:-  Dilcia Quintero a 20 y.o. pleasant female comes in for a follow up visit.  She has persistent rash over the scalp which is showing some improvement with topical creams.  She has seen improvement in her fatigue and joint pain.  Persistent photosensitivity.  No sicca syndrome.  No Raynaud's phenomenon.    Review of Systems   Constitutional: Negative for chills and fever.   HENT: Negative for congestion and sore throat.    Eyes: Negative for blurred vision and redness.   Respiratory: Negative for cough and shortness of breath.    Cardiovascular: Negative for chest pain and leg swelling.   Gastrointestinal: Negative for abdominal pain.   Genitourinary: Negative for dysuria.   Musculoskeletal: Positive for back pain, joint pain, myalgias and neck pain. Negative for falls.   Skin: Positive for itching and rash.   Neurological: Negative for headaches.   Endo/Heme/Allergies: Does not bruise/bleed easily.   Psychiatric/Behavioral: Negative for memory loss. The patient does not have insomnia.        Past Medical History:   Diagnosis Date    Lupus        No past surgical history on file.     Social History     Tobacco Use    Smoking status: Never Smoker    Smokeless tobacco: Never Used   Substance Use Topics    Alcohol  use: Never     Frequency: Never    Drug use: Never       Family History   Problem Relation Age of Onset    Lupus Sister        Review of patient's allergies indicates:   Allergen Reactions    Sulfamethoxazole-trimethoprim Itching     syncopal episode as a child               Medication List with Changes/Refills   New Medications    BELIMUMAB (BENLYSTA) 200 MG/ML ATIN    Inject 1 mL (200 mg total) into the skin every 7 days.   Current Medications    BETAMETHASONE DIPROPIONATE (DIPROLENE) 0.05 % CREAM    Apply topically 2 (two) times daily as needed. For lupus    CLOBETASOL (CLOBEX) 0.05 % SHAMPOO        FERROUS SULFATE (IRON ORAL)    Take by mouth.    FLUOCINOLONE AND SHOWER CAP (DERMA-SMOOTHE/FS SCALP OIL) 0.01 % OIL    Apply 1 application topically every evening.    HYDROCORTISONE 2.5 % CREAM    Apply topically 2 (two) times daily as needed. For lupus affecting face.    KETOCONAZOLE (NIZORAL) 2 % SHAMPOO       Changed and/or Refilled Medications    Modified Medication Previous Medication    HYDROXYCHLOROQUINE (PLAQUENIL) 200 MG TABLET hydroxychloroquine (PLAQUENIL) 200 mg tablet       Take 1 tablet (200 mg total) by mouth 2 (two) times daily.    Take 1 tablet (200 mg total) by mouth 2 (two) times daily.    PREDNISONE (DELTASONE) 10 MG TABLET predniSONE (DELTASONE) 20 MG tablet       Take 1 tablet (10 mg total) by mouth once daily.    Take 1 tablet (20 mg total) by mouth once daily.       Assessment/Plans:-  1. Other systemic lupus erythematosus with other organ involvement    2. Discoid lupus erythematosus    3. Systemic lupus erythematosus arthritis      Problem List Items Addressed This Visit        Derm    Discoid lupus erythematosus    Current Assessment & Plan     Improving discoid lupus on topical medications along with Plaquenil and prednisone.  Still has multiple active rash on the scalp.  Advised to follow up with Dermatology for intralesional Kenalog injection.  Start Benlysta.  Will try to start  potent immunosuppressive agents like CellCept or Imuran next visit.         Relevant Medications    predniSONE (DELTASONE) 10 MG tablet    hydroxychloroquine (PLAQUENIL) 200 mg tablet       Immunology/Multi System    Systemic lupus erythematosus arthritis - Primary    Current Assessment & Plan     Improving lupus arthritis.  Persistent leukopenia anemia or thrombocytosis and discoid lupus.  Normal muscle enzymes.  Extreme photosensitivity.  Labs show improvement.  Advised to reduce prednisone to 10 mg daily, continue Plaquenil and start Benlysta.  Advised in detail about all adverse effects of the medication including life-threatening infections and allergic reactions.  Voiced understanding.         Relevant Medications    belimumab (BENLYSTA) 200 mg/mL AtIn    predniSONE (DELTASONE) 10 MG tablet    hydroxychloroquine (PLAQUENIL) 200 mg tablet       Oncology    Other autoimmune hemolytic anemias    Current Assessment & Plan     Improving on prednisone, Plaquenil.  Consider rituximab if any worsening.             # Follow up in about 2 months (around 5/24/2020).      Disclaimer: This note was prepared using voice recognition system and is likely to have sound alike errors and is not proof read.  Please call me with any questions.

## 2020-03-24 NOTE — ASSESSMENT & PLAN NOTE
Improving lupus arthritis.  Persistent leukopenia anemia or thrombocytosis and discoid lupus.  Normal muscle enzymes.  Extreme photosensitivity.  Labs show improvement.  Advised to reduce prednisone to 10 mg daily, continue Plaquenil and start Benlysta.  Advised in detail about all adverse effects of the medication including life-threatening infections and allergic reactions.  Voiced understanding.

## 2020-03-24 NOTE — TELEPHONE ENCOUNTER
----- Message from Magan Barker MD sent at 3/24/2020  3:11 PM CDT -----  Follow up in clinic in 2 months with lupus labs one week prior. Thanks.

## 2020-03-24 NOTE — ASSESSMENT & PLAN NOTE
Improving discoid lupus on topical medications along with Plaquenil and prednisone.  Still has multiple active rash on the scalp.  Advised to follow up with Dermatology for intralesional Kenalog injection.  Start Benlysta.  Will try to start potent immunosuppressive agents like CellCept or Imuran next visit.

## 2020-03-26 ENCOUNTER — TELEPHONE (OUTPATIENT)
Dept: INFUSION THERAPY | Facility: HOSPITAL | Age: 20
End: 2020-03-26

## 2020-03-26 ENCOUNTER — TELEPHONE (OUTPATIENT)
Dept: PHARMACY | Facility: CLINIC | Age: 20
End: 2020-03-26

## 2020-03-26 NOTE — TELEPHONE ENCOUNTER
Called pat to confirm her cancellation on 3/31  She stated DR MAURICIO wants to hold off because of Covid 19

## 2020-03-26 NOTE — TELEPHONE ENCOUNTER
Informed Patient  that [facility] received prescription for Benlysta and benefits investigation is required.  [facility] will be back in touch once insurance determination is received.

## 2020-03-30 NOTE — TELEPHONE ENCOUNTER
DOCUMENTATION ONLY:     Prior Authorization for Benlysta is NOT required at this time per Filomena at insurance.       Case ID# none    Co-Pay: $0.00    Patient Assistance IS NOT required.     Forward to clinical pharmacist for consult & shipment.     ERICH

## 2020-03-31 NOTE — TELEPHONE ENCOUNTER
"Initial Benlysta 200 mg consult completed on 3/31. Benlysta autoinjectors will be shipped on  to arrive on  via [employer]. $0 copay. Patient will start Benlysta Autoinjector 200mg this weekend with her mom. Address confirmed, CC on file. Confirmed 2 patient identifiers - name and . Therapy Appropriate.     - Benlysta Autoinjector 200mg:  Inject 1 pen (200mg) every 7 days.      -Storage: Refrigerate between 36-46 degrees Fahrenheit  Use within 12 HOURS of taking out of fridge.     -Injection technique:  - Wash hands before and after injection.  - Monthly RX will come with gauze, bandaids, and alcohol swabs.  - Patient may inject in either the tops of the thighs, or abdomen- but at least 2 inches away from her belly button. Patient was instructed to rotate injections sites.  - Examine autoinjector Inspection Window to ensure no particulates, cloudiness, etc and Benlysta solution should be colorless to slightly yellow solution.  - Patient is to wipe down the injection site with the alcohol pad, wait to dry.  Gently squeeze the area of the cleaned skin and hold it firmly.  -Do not remove the Ring Cap until right before you give the injection. Remove by twisting or pulling, do not put Ring Cap back on.  Position the pen so that you can see the injection window. Hold the pen at 90 degree angle to injection site, firmly press autoinjector all the way down and hold in place, this will start the injection. You may hear a "first click" and see the Purple Indicator in Inspection Window start to move. Continue to hold until you see the Purple Indicator has stopped moving. The "second click" may be heard before Purple Indicator stops. Injection may take up to 15 seconds to complete. When finished, lift autoinjector from site and dispose in sharps container.   -Patient will use sharps container; once full, per [place] law, she/ he may lock the sharps container and place in her trash. She/ he can then contact the Pharmacy and we " will replace the sharps at no additional charge.     -Potential Side effects:  Injection site reactions, diarrhea, cold like symptoms, persistent cough, insomnia, depression, headache.  Patient advised to call if any signs of allergic reaction, infection, or use of live vaccines.     -DDI: Medication list reviewed and potential DDIs addressed.  Patient verbalized understanding. Compliance stressed. Patient advised to keep a calendar marking dates of injections to ensure better compliance. Patient advised to call myself or provider should any questions arise. Patient plans to start Benlysta Syringes on 4/4. Consultation included: indication; goals of treatment; administration; storage and handling; side effects; how to handle side effects; the importance of compliance; how to handle missed doses; the importance of laboratory monitoring; the importance of keeping all follow up appointments. Patient understands to report any medication changes to [facility] and provider. All questions answered and addressed to patients satisfaction.    Dee Aaron, PharmD  [facility]  [phone]

## 2020-04-01 LAB
ANTI-PM/SCL AB: <20 UNITS
ANTI-SS-A 52 KD AB, IGG: 124 UNITS
EJ AB SER QL: NEGATIVE
ENA JO1 AB SER IA-ACNC: <20 UNITS
ENA SM+RNP AB SER IA-ACNC: >150 UNITS
FIBRILLARIN (U3 RNP): NEGATIVE
KU AB SER QL: NEGATIVE
MDA-5 (P140): <20 UNITS
MI2 AB SER QL: NEGATIVE
NXP-2 (P140): <20 UNITS
OJ AB SER QL: NEGATIVE
PL12 AB SER QL: NEGATIVE
PL7 AB SER QL: NEGATIVE
SRP AB SERPL QL: NEGATIVE
TIF1 GAMMA (P155/140): <20 UNITS
U2 SNRNP: ABNORMAL

## 2020-04-08 ENCOUNTER — PATIENT MESSAGE (OUTPATIENT)
Dept: RHEUMATOLOGY | Facility: CLINIC | Age: 20
End: 2020-04-08

## 2020-04-09 ENCOUNTER — PATIENT MESSAGE (OUTPATIENT)
Dept: RHEUMATOLOGY | Facility: CLINIC | Age: 20
End: 2020-04-09

## 2020-04-09 ENCOUNTER — TELEPHONE (OUTPATIENT)
Dept: RHEUMATOLOGY | Facility: CLINIC | Age: 20
End: 2020-04-09

## 2020-04-09 ENCOUNTER — OFFICE VISIT (OUTPATIENT)
Dept: RHEUMATOLOGY | Facility: CLINIC | Age: 20
End: 2020-04-09
Payer: OTHER GOVERNMENT

## 2020-04-09 DIAGNOSIS — L93.0 DISCOID LUPUS ERYTHEMATOSUS: ICD-10-CM

## 2020-04-09 DIAGNOSIS — R07.89 MUSCULOSKELETAL CHEST PAIN: Primary | ICD-10-CM

## 2020-04-09 PROCEDURE — 99214 OFFICE O/P EST MOD 30 MIN: CPT | Mod: 95,,, | Performed by: INTERNAL MEDICINE

## 2020-04-09 PROCEDURE — 99214 PR OFFICE/OUTPT VISIT, EST, LEVL IV, 30-39 MIN: ICD-10-PCS | Mod: 95,,, | Performed by: INTERNAL MEDICINE

## 2020-04-09 RX ORDER — CYCLOBENZAPRINE HCL 10 MG
10 TABLET ORAL NIGHTLY
Qty: 30 TABLET | Refills: 0 | Status: SHIPPED | OUTPATIENT
Start: 2020-04-09 | End: 2020-05-09

## 2020-04-09 NOTE — ASSESSMENT & PLAN NOTE
MSK chest pain associated with worsening anxiety disorder. Discussed in detail about the diagnosis and treatment options. She would like to monitor for symptoms. Will try flexeril prn.

## 2020-04-09 NOTE — PROGRESS NOTES
RHEUMATOLOGY FOLLOW UP - TELE VISIT     The patient location is: [place]  The chief complaint leading to consultation is: Chest pain  Visit type: Virtual visit with synchronous audio and video  Total time spent with patient: 25 minutes  Each patient to whom he or she provides medical services by telemedicine is:  (1) informed of the relationship between the physician and patient and the respective role of any other health care provider with respect to management of the patient; and (2) notified that he or she may decline to receive medical services by telemedicine and may withdraw from such care at any time.    HPI:-  Dilcia Quintero a 20 y.o. pleasant female seen today through My chart video visit for follow up.  She complains of she is being for the past 2 days-acute onset, mild, achy, involving the right side of the chest, right lower part of the neck and right shoulder region extending to the back.  No associated epigastric pain, acid reflux, abdominal pain.  She has been really worried about her sister in [place] who is also suffering from lupus.  She is worried that she is not taking good care of herself and has been working even with the viral pandemic.  She has been very stressed for the past week.  She denies any associated shortness of breath, palpitations, exertional chest pain.  The chest pain is usually around the end of the day when she is lying down.  Her lupus has been showing improvement she on the present medication.  Her rash have significantly improved.    Review of Systems   Constitutional: Negative for chills and fever.   HENT: Negative for congestion and sore throat.    Eyes: Negative for blurred vision and redness.   Respiratory: Negative for cough and shortness of breath.    Cardiovascular: Negative for chest pain and leg swelling.   Gastrointestinal: Negative for abdominal pain.   Genitourinary: Negative for dysuria.   Musculoskeletal: Positive for back pain, myalgias and neck pain. Negative for  falls and joint pain.   Skin: Negative for rash.   Neurological: Negative for headaches.   Endo/Heme/Allergies: Does not bruise/bleed easily.   Psychiatric/Behavioral: Positive for depression. Negative for memory loss. The patient is nervous/anxious. The patient does not have insomnia.        Past Medical History:   Diagnosis Date    Lupus        History reviewed. No pertinent surgical history.     Social History     Tobacco Use    Smoking status: Never Smoker    Smokeless tobacco: Never Used   Substance Use Topics    Alcohol use: Never     Frequency: Never    Drug use: Never       Family History   Problem Relation Age of Onset    Lupus Sister        Review of patient's allergies indicates:   Allergen Reactions    Sulfamethoxazole-trimethoprim Itching     syncopal episode as a child       Physical exam:-    GEN: awake, alert, non-diaphoretic, no psychomotor agitation, no acute distress    HEENT :Head: atraumatic, normocephalic, no rashes noted, no lesions noted;    Eyes: NO redness, discharge, swelling, or lesions    Nose: NO redness, swelling, discharge, deformity, or impetigo/crusting    Skin: no lesions, wounds, erythema, or cyanosis noted on face or hands    Cardiopulmonary: no increased respiratory effort, speaking in clear sentences    MSK: normal ROM in the neck, Upper extremities, Lower extremities  Good ROM of hands, fist formation 100% and , no obvious synovitis    Good ambulation in front of the camera    Neuro: cranial nerves grossly normal, speech normal rate and rhythm, orientation arrived to appointment on time with no prompting, moved both upper extremities equally    Pysch:  appearance, behavior, and attitude- well groomed, pleasant, cooperative. Anxious.    Medication List with Changes/Refills   New Medications    CYCLOBENZAPRINE (FLEXERIL) 10 MG TABLET    Take 1 tablet (10 mg total) by mouth every evening.   Current Medications    BELIMUMAB (BENLYSTA) 200 MG/ML ATIN    Inject 1 mL (200 mg  total) into the skin every 7 days.    BETAMETHASONE DIPROPIONATE (DIPROLENE) 0.05 % CREAM    Apply topically 2 (two) times daily as needed. For lupus    CLOBETASOL (CLOBEX) 0.05 % SHAMPOO        FERROUS SULFATE (IRON ORAL)    Take by mouth.    FLUOCINOLONE AND SHOWER CAP (DERMA-SMOOTHE/FS SCALP OIL) 0.01 % OIL    Apply 1 application topically every evening.    HYDROCORTISONE 2.5 % CREAM    Apply topically 2 (two) times daily as needed. For lupus affecting face.    HYDROXYCHLOROQUINE (PLAQUENIL) 200 MG TABLET    Take 1 tablet (200 mg total) by mouth 2 (two) times daily.    KETOCONAZOLE (NIZORAL) 2 % SHAMPOO        PREDNISONE (DELTASONE) 10 MG TABLET    Take 1 tablet (10 mg total) by mouth once daily.       Assessment/Plans:-  1. Musculoskeletal chest pain      Problem List Items Addressed This Visit        Other    Musculoskeletal chest pain - Primary    Current Assessment & Plan     MSK chest pain associated with worsening anxiety disorder. Discussed in detail about the diagnosis and treatment options. She would like to monitor for symptoms. Will try flexeril prn.          Relevant Medications    cyclobenzaprine (FLEXERIL) 10 MG tablet        Disclaimer: This note was prepared using voice recognition system and is likely to have sound alike errors and is not proof read.  Please call me with any questions.

## 2020-04-09 NOTE — TELEPHONE ENCOUNTER
Returned call to pt regarding shoulder/ chest pain 7 to see if we can set up for video visit today. No answer, left msg to return call.

## 2020-04-27 ENCOUNTER — TELEPHONE (OUTPATIENT)
Dept: PHARMACY | Facility: CLINIC | Age: 20
End: 2020-04-27

## 2020-05-05 ENCOUNTER — TELEPHONE (OUTPATIENT)
Dept: RHEUMATOLOGY | Facility: CLINIC | Age: 20
End: 2020-05-05

## 2020-05-05 NOTE — TELEPHONE ENCOUNTER
Called patient regarding appointment on 6.1.20  needing to be rescheduled due to Dr. Barker being out of clinic that day. Rescheduled appointment to 5.26.20 at 12.15 pm. Pt verbalized understanding.

## 2020-05-20 ENCOUNTER — LAB VISIT (OUTPATIENT)
Dept: LAB | Facility: HOSPITAL | Age: 20
End: 2020-05-20
Attending: INTERNAL MEDICINE
Payer: OTHER GOVERNMENT

## 2020-05-20 DIAGNOSIS — D59.19 OTHER AUTOIMMUNE HEMOLYTIC ANEMIAS: ICD-10-CM

## 2020-05-20 DIAGNOSIS — M32.19 OTHER SYSTEMIC LUPUS ERYTHEMATOSUS WITH OTHER ORGAN INVOLVEMENT: ICD-10-CM

## 2020-05-20 DIAGNOSIS — L93.0 DISCOID LUPUS ERYTHEMATOSUS: ICD-10-CM

## 2020-05-20 LAB
ALBUMIN SERPL BCP-MCNC: 3.5 G/DL (ref 3.5–5.2)
ALP SERPL-CCNC: 83 U/L (ref 55–135)
ALT SERPL W/O P-5'-P-CCNC: 44 U/L (ref 10–44)
ANION GAP SERPL CALC-SCNC: 10 MMOL/L (ref 8–16)
AST SERPL-CCNC: 30 U/L (ref 10–40)
BASOPHILS # BLD AUTO: 0 K/UL (ref 0–0.2)
BASOPHILS NFR BLD: 0 % (ref 0–1.9)
BILIRUB SERPL-MCNC: 0.2 MG/DL (ref 0.1–1)
BUN SERPL-MCNC: 12 MG/DL (ref 6–20)
CALCIUM SERPL-MCNC: 9.3 MG/DL (ref 8.7–10.5)
CHLORIDE SERPL-SCNC: 103 MMOL/L (ref 95–110)
CO2 SERPL-SCNC: 22 MMOL/L (ref 23–29)
CREAT SERPL-MCNC: 0.7 MG/DL (ref 0.5–1.4)
CRP SERPL-MCNC: 5.3 MG/L (ref 0–8.2)
DIFFERENTIAL METHOD: ABNORMAL
EOSINOPHIL # BLD AUTO: 0.1 K/UL (ref 0–0.5)
EOSINOPHIL NFR BLD: 1.8 % (ref 0–8)
ERYTHROCYTE [DISTWIDTH] IN BLOOD BY AUTOMATED COUNT: 13.3 % (ref 11.5–14.5)
ERYTHROCYTE [SEDIMENTATION RATE] IN BLOOD BY WESTERGREN METHOD: 56 MM/HR (ref 0–20)
EST. GFR  (AFRICAN AMERICAN): >60 ML/MIN/1.73 M^2
EST. GFR  (NON AFRICAN AMERICAN): >60 ML/MIN/1.73 M^2
GLUCOSE SERPL-MCNC: 78 MG/DL (ref 70–110)
HCT VFR BLD AUTO: 32.5 % (ref 37–48.5)
HGB BLD-MCNC: 10.8 G/DL (ref 12–16)
IMM GRANULOCYTES # BLD AUTO: 0.02 K/UL (ref 0–0.04)
IMM GRANULOCYTES NFR BLD AUTO: 0.3 % (ref 0–0.5)
LYMPHOCYTES # BLD AUTO: 0.4 K/UL (ref 1–4.8)
LYMPHOCYTES NFR BLD: 7.1 % (ref 18–48)
MCH RBC QN AUTO: 30.3 PG (ref 27–31)
MCHC RBC AUTO-ENTMCNC: 33.2 G/DL (ref 32–36)
MCV RBC AUTO: 91 FL (ref 82–98)
MONOCYTES # BLD AUTO: 0.3 K/UL (ref 0.3–1)
MONOCYTES NFR BLD: 4.7 % (ref 4–15)
NEUTROPHILS # BLD AUTO: 5.4 K/UL (ref 1.8–7.7)
NEUTROPHILS NFR BLD: 86.1 % (ref 38–73)
NRBC BLD-RTO: 0 /100 WBC
PLATELET # BLD AUTO: 342 K/UL (ref 150–350)
PMV BLD AUTO: 10.4 FL (ref 9.2–12.9)
POTASSIUM SERPL-SCNC: 3.8 MMOL/L (ref 3.5–5.1)
PROT SERPL-MCNC: 8.4 G/DL (ref 6–8.4)
RBC # BLD AUTO: 3.57 M/UL (ref 4–5.4)
SODIUM SERPL-SCNC: 135 MMOL/L (ref 136–145)
WBC # BLD AUTO: 6.22 K/UL (ref 3.9–12.7)

## 2020-05-20 PROCEDURE — 80053 COMPREHEN METABOLIC PANEL: CPT

## 2020-05-20 PROCEDURE — 86160 COMPLEMENT ANTIGEN: CPT

## 2020-05-20 PROCEDURE — 85025 COMPLETE CBC W/AUTO DIFF WBC: CPT

## 2020-05-20 PROCEDURE — 86140 C-REACTIVE PROTEIN: CPT

## 2020-05-20 PROCEDURE — 36415 COLL VENOUS BLD VENIPUNCTURE: CPT

## 2020-05-20 PROCEDURE — 85651 RBC SED RATE NONAUTOMATED: CPT

## 2020-05-20 PROCEDURE — 86160 COMPLEMENT ANTIGEN: CPT | Mod: 59

## 2020-05-21 LAB
C3 SERPL-MCNC: 98 MG/DL (ref 50–180)
C4 SERPL-MCNC: 27 MG/DL (ref 11–44)

## 2020-05-22 ENCOUNTER — TELEPHONE (OUTPATIENT)
Dept: PHARMACY | Facility: CLINIC | Age: 20
End: 2020-05-22

## 2020-05-26 ENCOUNTER — OFFICE VISIT (OUTPATIENT)
Dept: RHEUMATOLOGY | Facility: CLINIC | Age: 20
End: 2020-05-26
Payer: OTHER GOVERNMENT

## 2020-05-26 VITALS
BODY MASS INDEX: 29.25 KG/M2 | HEART RATE: 106 BPM | WEIGHT: 165.13 LBS | SYSTOLIC BLOOD PRESSURE: 122 MMHG | DIASTOLIC BLOOD PRESSURE: 75 MMHG

## 2020-05-26 DIAGNOSIS — L93.0 DISCOID LUPUS ERYTHEMATOSUS: Primary | ICD-10-CM

## 2020-05-26 DIAGNOSIS — D84.9 IMMUNOSUPPRESSED STATUS: ICD-10-CM

## 2020-05-26 DIAGNOSIS — Z79.899 LONG-TERM USE OF PLAQUENIL: ICD-10-CM

## 2020-05-26 DIAGNOSIS — Z79.899 LONG TERM CURRENT USE OF IMMUNOSUPPRESSIVE DRUG: ICD-10-CM

## 2020-05-26 DIAGNOSIS — Z30.09 ENCOUNTER FOR GENERAL COUNSELING AND ADVICE ON CONTRACEPTIVE MANAGEMENT: ICD-10-CM

## 2020-05-26 DIAGNOSIS — M32.9 SYSTEMIC LUPUS ERYTHEMATOSUS ARTHRITIS: ICD-10-CM

## 2020-05-26 DIAGNOSIS — M32.19 OTHER SYSTEMIC LUPUS ERYTHEMATOSUS WITH OTHER ORGAN INVOLVEMENT: ICD-10-CM

## 2020-05-26 PROCEDURE — 99213 OFFICE O/P EST LOW 20 MIN: CPT | Mod: PBBFAC | Performed by: INTERNAL MEDICINE

## 2020-05-26 PROCEDURE — 99215 PR OFFICE/OUTPT VISIT, EST, LEVL V, 40-54 MIN: ICD-10-PCS | Mod: S$PBB,,, | Performed by: INTERNAL MEDICINE

## 2020-05-26 PROCEDURE — 99215 OFFICE O/P EST HI 40 MIN: CPT | Mod: S$PBB,,, | Performed by: INTERNAL MEDICINE

## 2020-05-26 PROCEDURE — 99999 PR PBB SHADOW E&M-EST. PATIENT-LVL III: CPT | Mod: PBBFAC,,, | Performed by: INTERNAL MEDICINE

## 2020-05-26 PROCEDURE — 99999 PR PBB SHADOW E&M-EST. PATIENT-LVL III: ICD-10-PCS | Mod: PBBFAC,,, | Performed by: INTERNAL MEDICINE

## 2020-05-26 RX ORDER — PREDNISONE 5 MG/1
TABLET ORAL
Qty: 90 TABLET | Refills: 0 | Status: SHIPPED | OUTPATIENT
Start: 2020-05-26 | End: 2020-08-05 | Stop reason: SDUPTHER

## 2020-05-26 NOTE — PROGRESS NOTES
RHEUMATOLOGY CLINIC FOLLOW UP VISIT  Chief complaints:-  To follow up for lupus.    HPI:-  Dilcia Quintero a 20 y.o. pleasant female comes in for a follow up visit.  She reports doing well today. Improved arthritis, no rash, no pain today. Her anxiety has improved significantly.     Review of Systems   Constitutional: Negative for chills and fever.   HENT: Negative for congestion and sore throat.    Eyes: Negative for blurred vision and redness.   Respiratory: Negative for cough and shortness of breath.    Cardiovascular: Negative for chest pain and leg swelling.   Gastrointestinal: Negative for abdominal pain.   Genitourinary: Negative for dysuria.   Musculoskeletal: Negative for back pain, falls, joint pain, myalgias and neck pain.   Skin: Negative for rash.   Neurological: Negative for headaches.   Endo/Heme/Allergies: Does not bruise/bleed easily.   Psychiatric/Behavioral: Negative for memory loss. The patient does not have insomnia.        Past Medical History:   Diagnosis Date    Lupus        History reviewed. No pertinent surgical history.     Social History     Tobacco Use    Smoking status: Never Smoker    Smokeless tobacco: Never Used   Substance Use Topics    Alcohol use: Never     Frequency: Never    Drug use: Never       Family History   Problem Relation Age of Onset    Lupus Sister        Review of patient's allergies indicates:   Allergen Reactions    Sulfamethoxazole-trimethoprim Itching     syncopal episode as a child       Vitals:    05/26/20 1240   BP: 122/75   Pulse: 106   Weight: 74.9 kg (165 lb 2 oz)   PainSc: 0-No pain       Physical Exam   Constitutional: She is oriented to person, place, and time and well-developed, well-nourished, and in no distress. No distress.   HENT:   Head: Normocephalic.   Mouth/Throat: Oropharynx is clear and moist.   Eyes: Pupils are equal, round, and reactive to light. Conjunctivae and EOM are normal.    Neck: Normal range of motion. Neck supple.   Cardiovascular: Normal rate and intact distal pulses.   Pulmonary/Chest: Effort normal. No respiratory distress.   Abdominal: Soft. There is no tenderness.   Musculoskeletal:   No synovitis over small joints of hands or feet.  No effusion over large joints.   Neurological: She is alert and oriented to person, place, and time. No cranial nerve deficit.   Skin: Skin is warm. No rash noted. No erythema.   Psychiatric: Mood and affect normal.   Nursing note and vitals reviewed.      Medication List with Changes/Refills   Current Medications    BELIMUMAB (BENLYSTA) 200 MG/ML ATIN    Inject 1 mL (200 mg total) into the skin every 7 days.    BETAMETHASONE DIPROPIONATE (DIPROLENE) 0.05 % CREAM    Apply topically 2 (two) times daily as needed. For lupus    CLOBETASOL (CLOBEX) 0.05 % SHAMPOO        FERROUS SULFATE (IRON ORAL)    Take by mouth.    FLUOCINOLONE AND SHOWER CAP (DERMA-SMOOTHE/FS SCALP OIL) 0.01 % OIL    Apply 1 application topically every evening.    HYDROCORTISONE 2.5 % CREAM    Apply topically 2 (two) times daily as needed. For lupus affecting face.    HYDROXYCHLOROQUINE (PLAQUENIL) 200 MG TABLET    Take 1 tablet (200 mg total) by mouth 2 (two) times daily.    KETOCONAZOLE (NIZORAL) 2 % SHAMPOO       Changed and/or Refilled Medications    Modified Medication Previous Medication    PREDNISONE (DELTASONE) 5 MG TABLET predniSONE (DELTASONE) 10 MG tablet       Take 7.5 mg once daily for 2 weeks and then reduce to 5 mg daily thereafter.    Take 1 tablet (10 mg total) by mouth once daily.       Assessment/Plans:-  1. Discoid lupus erythematosus    2. Systemic lupus erythematosus arthritis    3. Other systemic lupus erythematosus with other organ involvement    4. Immunosuppressed status    5. Long-term use of Plaquenil    6. Long term current use of immunosuppressive drug    7. Encounter for general counseling and advice on contraceptive management      Problem List  Items Addressed This Visit        Derm    Discoid lupus erythematosus - Primary    Current Assessment & Plan     Significant improvement on present medications.  No active rash.  Continue Benlysta and Plaquenil.         Relevant Medications    predniSONE (DELTASONE) 5 MG tablet       Renal/    Encounter for general counseling and advice on contraceptive management    Current Assessment & Plan     Advised 2 methods of contraception.            Immunology/Multi System    Systemic lupus erythematosus arthritis    Current Assessment & Plan     Well controlled lupus arthritis without any active synovitis on Plaquenil and Benlysta.  Even her inflammatory markers have significantly improved including her complement levels.  Excellent improvement.  Continue         Relevant Medications    predniSONE (DELTASONE) 5 MG tablet    Immunosuppressed status    Current Assessment & Plan     Compromised immune system secondary to autoimmune disease and use of immunosuppressive drugs. Monitor carefully for infections. Advised to get immediate medical care if any infection. Also advised strict adherence to age appropriate vaccinations and cancer screenings with PCP.             Other    Long-term use of Plaquenil    Current Assessment & Plan     Advised Plaquenil retinal screen yearly.         Relevant Orders    Ambulatory referral/consult to Ophthalmology    Long term current use of immunosuppressive drug    Current Assessment & Plan     Hold Benlysta if any infection.  Safety labs normal.           Other Visit Diagnoses     Other systemic lupus erythematosus with other organ involvement        Relevant Medications    predniSONE (DELTASONE) 5 MG tablet        # Follow up in about 3 months (around 8/26/2020).      Disclaimer: This note was prepared using voice recognition system and is likely to have sound alike errors and is not proof read.  Please call me with any questions.

## 2020-05-29 NOTE — ASSESSMENT & PLAN NOTE
Well controlled lupus arthritis without any active synovitis on Plaquenil and Benlysta.  Even her inflammatory markers have significantly improved including her complement levels.  Excellent improvement.  Continue

## 2020-05-29 NOTE — ASSESSMENT & PLAN NOTE
Significant improvement on present medications.  No active rash.  Continue Benlysta and Plaquenil.

## 2020-06-16 ENCOUNTER — OFFICE VISIT (OUTPATIENT)
Dept: DERMATOLOGY | Facility: CLINIC | Age: 20
End: 2020-06-16
Payer: OTHER GOVERNMENT

## 2020-06-16 DIAGNOSIS — L93.0 DISCOID LUPUS ERYTHEMATOSUS: ICD-10-CM

## 2020-06-16 DIAGNOSIS — L40.9 PSORIASIS OF SCALP: Primary | ICD-10-CM

## 2020-06-16 PROCEDURE — 99213 PR OFFICE/OUTPT VISIT, EST, LEVL III, 20-29 MIN: ICD-10-PCS | Mod: S$PBB,,, | Performed by: DERMATOLOGY

## 2020-06-16 PROCEDURE — 99999 PR PBB SHADOW E&M-EST. PATIENT-LVL III: CPT | Mod: PBBFAC,,, | Performed by: DERMATOLOGY

## 2020-06-16 PROCEDURE — 99213 OFFICE O/P EST LOW 20 MIN: CPT | Mod: PBBFAC | Performed by: DERMATOLOGY

## 2020-06-16 PROCEDURE — 99213 OFFICE O/P EST LOW 20 MIN: CPT | Mod: S$PBB,,, | Performed by: DERMATOLOGY

## 2020-06-16 PROCEDURE — 99999 PR PBB SHADOW E&M-EST. PATIENT-LVL III: ICD-10-PCS | Mod: PBBFAC,,, | Performed by: DERMATOLOGY

## 2020-06-16 NOTE — PROGRESS NOTES
Subjective:       Patient ID:  Dilcia Reed is a 20 y.o. female who presents for   Chief Complaint   Patient presents with    Follow-up     3 month f/u for rash; cleared up     History of Present Illness: The patient presents with f/u discoid lupus with skin manifestations and psoriasis  Location: lupus-arms, face; psoriasis scalp  Duration: several months scalp, diagnosed with lupus about one year ago  Signs/Symptoms: dry, itchy and flaky  Treatment:  On prednisone, plaquenil and Benlysta per rheumatology.  LOV started on dermasmooth oil for scalp, use for two weeks, improved and hasn't needed since.   Has not needed the hydrocortisone cream or betamethasone cream once she started her systemic medications.         Review of Systems   Constitutional: Negative for fever.   Skin: Positive for itching and rash.        Objective:    Physical Exam   Constitutional: She appears well-developed and well-nourished.   Neurological: She is alert and oriented to person, place, and time.   Psychiatric: She has a normal mood and affect.   Skin:   Areas Examined (abnormalities noted in diagram):   Scalp / Hair Palpated and Inspected  Head / Face Inspection Performed  Neck Inspection Performed  Chest / Axilla Inspection Performed  Abdomen Inspection Performed  Back Inspection Performed  RUE Inspected  LUE Inspection Performed  RLE Inspected  LLE Inspection Performed                   Diagram Legend     Erythematous scaling macule/papule c/w actinic keratosis       Vascular papule c/w angioma      Pigmented verrucoid papule/plaque c/w seborrheic keratosis      Yellow umbilicated papule c/w sebaceous hyperplasia      Irregularly shaped tan macule c/w lentigo     1-2 mm smooth white papules consistent with Milia      Movable subcutaneous cyst with punctum c/w epidermal inclusion cyst      Subcutaneous movable cyst c/w pilar cyst      Firm pink to brown papule c/w dermatofibroma      Pedunculated fleshy papule(s) c/w skin tag(s)       Evenly pigmented macule c/w junctional nevus     Mildly variegated pigmented, slightly irregular-bordered macule c/w mildly atypical nevus      Flesh colored to evenly pigmented papule c/w intradermal nevus       Pink pearly papule/plaque c/w basal cell carcinoma      Erythematous hyperkeratotic cursted plaque c/w SCC      Surgical scar with no sign of skin cancer recurrence      Open and closed comedones      Inflammatory papules and pustules      Verrucoid papule consistent consistent with wart     Erythematous eczematous patches and plaques     Dystrophic onycholytic nail with subungual debris c/w onychomycosis     Umbilicated papule    Erythematous-base heme-crusted tan verrucoid plaque consistent with inflamed seborrheic keratosis     Erythematous Silvery Scaling Plaque c/w Psoriasis     See annotation      Assessment / Plan:        Systemic lupus erythematosus with discoid lesions of the skin  - followed by rheumatology on plaquenil and Benlysta as well as oral prednisone  - has not required topicals, but can continue:  -     betamethasone dipropionate (DIPROLENE) 0.05 % cream; Apply topically 2 (two) times daily as needed. For lupus  Dispense: 45 g; Refill: 3  -     hydrocortisone 2.5 % cream; Apply topically 2 (two) times daily as needed. For lupus affecting face.  Dispense: 30 g; Refill: 3      Psoriasis of scalp, doing well  - not requiring treatment currently  CONTINUE  -     fluocinolone and shower cap (DERMA-SMOOTHE/FS SCALP OIL) 0.01 % Oil; Apply 1 application topically every evening as needed for flare-up.               Follow up if symptoms worsen or fail to improve.

## 2020-06-18 ENCOUNTER — TELEPHONE (OUTPATIENT)
Dept: PHARMACY | Facility: CLINIC | Age: 20
End: 2020-06-18

## 2020-06-18 NOTE — TELEPHONE ENCOUNTER
Refill call regarding benlysta at [facility]. Copay $0. Patient is in need of a refill, will ship  to arrive  with patient consent. Patient has not started any new medications including OTC drugs. Patient has not had any medication/ dose or instruction changes. No new allergies or side effects reported with this shipment. Medication is being taken as prescribed by physician and properly stored. Two patient identifiers:  and Address verified. Patient has no questions or concerns for RP. No sharps container needed. Next injection due  (1 injection on hand).

## 2020-07-17 DIAGNOSIS — M32.19 OTHER SYSTEMIC LUPUS ERYTHEMATOSUS WITH OTHER ORGAN INVOLVEMENT: ICD-10-CM

## 2020-07-17 DIAGNOSIS — M32.9 SYSTEMIC LUPUS ERYTHEMATOSUS ARTHRITIS: ICD-10-CM

## 2020-07-17 RX ORDER — BELIMUMAB 200 MG/ML
200 SOLUTION SUBCUTANEOUS
Qty: 4 ML | Refills: 3 | Status: SHIPPED | OUTPATIENT
Start: 2020-07-17 | End: 2020-11-09 | Stop reason: SDUPTHER

## 2020-08-05 DIAGNOSIS — M32.19 OTHER SYSTEMIC LUPUS ERYTHEMATOSUS WITH OTHER ORGAN INVOLVEMENT: ICD-10-CM

## 2020-08-05 DIAGNOSIS — M32.9 SYSTEMIC LUPUS ERYTHEMATOSUS ARTHRITIS: ICD-10-CM

## 2020-08-05 DIAGNOSIS — L93.0 DISCOID LUPUS ERYTHEMATOSUS: ICD-10-CM

## 2020-08-05 RX ORDER — PREDNISONE 5 MG/1
5 TABLET ORAL DAILY
Qty: 90 TABLET | Refills: 0 | Status: SHIPPED | OUTPATIENT
Start: 2020-08-05 | End: 2020-10-12

## 2020-08-20 ENCOUNTER — LAB VISIT (OUTPATIENT)
Dept: LAB | Facility: HOSPITAL | Age: 20
End: 2020-08-20
Attending: INTERNAL MEDICINE
Payer: OTHER GOVERNMENT

## 2020-08-20 ENCOUNTER — TELEPHONE (OUTPATIENT)
Dept: PHARMACY | Facility: CLINIC | Age: 20
End: 2020-08-20

## 2020-08-20 DIAGNOSIS — L93.0 DISCOID LUPUS ERYTHEMATOSUS: ICD-10-CM

## 2020-08-20 DIAGNOSIS — M32.19 OTHER SYSTEMIC LUPUS ERYTHEMATOSUS WITH OTHER ORGAN INVOLVEMENT: ICD-10-CM

## 2020-08-20 DIAGNOSIS — D59.19 OTHER AUTOIMMUNE HEMOLYTIC ANEMIAS: ICD-10-CM

## 2020-08-20 LAB
BACTERIA #/AREA URNS HPF: ABNORMAL /HPF
BILIRUB UR QL STRIP: NEGATIVE
CLARITY UR: CLEAR
COLOR UR: YELLOW
CREAT UR-MCNC: 253 MG/DL (ref 15–325)
GLUCOSE UR QL STRIP: NEGATIVE
HGB UR QL STRIP: NEGATIVE
HYALINE CASTS #/AREA URNS LPF: 0 /LPF
KETONES UR QL STRIP: NEGATIVE
LEUKOCYTE ESTERASE UR QL STRIP: NEGATIVE
MICROSCOPIC COMMENT: ABNORMAL
NITRITE UR QL STRIP: NEGATIVE
PH UR STRIP: 6 [PH] (ref 5–8)
PROT UR QL STRIP: ABNORMAL
PROT UR-MCNC: 24 MG/DL (ref 0–15)
PROT/CREAT UR: 0.09 MG/G{CREAT} (ref 0–0.2)
RBC #/AREA URNS HPF: 2 /HPF (ref 0–4)
SP GR UR STRIP: 1.03 (ref 1–1.03)
SQUAMOUS #/AREA URNS HPF: 7 /HPF
URN SPEC COLLECT METH UR: ABNORMAL
WBC #/AREA URNS HPF: 4 /HPF (ref 0–5)

## 2020-08-20 PROCEDURE — 82570 ASSAY OF URINE CREATININE: CPT

## 2020-08-20 PROCEDURE — 81000 URINALYSIS NONAUTO W/SCOPE: CPT

## 2020-08-24 ENCOUNTER — TELEPHONE (OUTPATIENT)
Dept: RHEUMATOLOGY | Facility: CLINIC | Age: 20
End: 2020-08-24

## 2020-08-27 ENCOUNTER — OFFICE VISIT (OUTPATIENT)
Dept: RHEUMATOLOGY | Facility: CLINIC | Age: 20
End: 2020-08-27
Payer: OTHER GOVERNMENT

## 2020-08-27 ENCOUNTER — TELEPHONE (OUTPATIENT)
Dept: RHEUMATOLOGY | Facility: CLINIC | Age: 20
End: 2020-08-27

## 2020-08-27 DIAGNOSIS — D84.9 IMMUNOSUPPRESSED STATUS: ICD-10-CM

## 2020-08-27 DIAGNOSIS — M32.9 SYSTEMIC LUPUS ERYTHEMATOSUS ARTHRITIS: Primary | ICD-10-CM

## 2020-08-27 DIAGNOSIS — L93.0 DISCOID LUPUS ERYTHEMATOSUS: ICD-10-CM

## 2020-08-27 PROCEDURE — 99214 PR OFFICE/OUTPT VISIT, EST, LEVL IV, 30-39 MIN: ICD-10-PCS | Mod: 95,,, | Performed by: INTERNAL MEDICINE

## 2020-08-27 PROCEDURE — 99214 OFFICE O/P EST MOD 30 MIN: CPT | Mod: 95,,, | Performed by: INTERNAL MEDICINE

## 2020-08-27 RX ORDER — AZATHIOPRINE 50 MG/1
50 TABLET ORAL DAILY
Qty: 30 TABLET | Refills: 2 | Status: SHIPPED | OUTPATIENT
Start: 2020-08-27 | End: 2020-10-12

## 2020-08-27 NOTE — TELEPHONE ENCOUNTER
Spoke with pt and scheduled labs at [facility] for 9.28.20 and 11.25.20 and 3 mth follow up with  for 21.1.2020 at 3.15 pm. Pt verbalized understanding

## 2020-08-27 NOTE — PROGRESS NOTES
RHEUMATOLOGY FOLLOW UP - TELE VISIT     The patient location is: [place]  The chief complaint leading to consultation is: Lupus   Visit type: Virtual visit with synchronous audio and video  Total time spent with patient: 10 minutes  Each patient to whom he or she provides medical services by telemedicine is:  (1) informed of the relationship between the physician and patient and the respective role of any other health care provider with respect to management of the patient; and (2) notified that he or she may decline to receive medical services by telemedicine and may withdraw from such care at any time.    HPI:-  Dilcia Quintero a 20 y.o. pleasant female seen today through My chart video visit for follow up.  She reports that her discoid lupus is well controlled but she still has mild swelling and pain around her ankles and wrists every now and then.  Mild stiffness around fingers but no significant pain.  Morning stiffness sometimes lasts up to 30 min.  No skin rash.  No photosensitive malar rash, sicca syndrome or Raynaud's phenomenon.  No adverse effects from Plaquenil or Benlysta.  On 5 mg daily prednisone.    Review of Systems   Constitutional: Negative for chills, fever and malaise/fatigue.   HENT: Negative for congestion and sore throat.    Eyes: Negative for blurred vision and redness.   Respiratory: Negative for cough and shortness of breath.    Cardiovascular: Negative for chest pain and leg swelling.   Gastrointestinal: Negative for abdominal pain.   Genitourinary: Negative for dysuria.   Musculoskeletal: Positive for joint pain. Negative for back pain, falls, myalgias and neck pain.   Skin: Negative for rash.   Neurological: Negative for headaches.   Endo/Heme/Allergies: Does not bruise/bleed easily.   Psychiatric/Behavioral: Negative for memory loss. The patient does not have insomnia.        Past Medical History:   Diagnosis Date    Lupus        History reviewed. No pertinent surgical history.     Social  History     Tobacco Use    Smoking status: Never Smoker    Smokeless tobacco: Never Used   Substance Use Topics    Alcohol use: Never     Frequency: Never    Drug use: Never       Family History   Problem Relation Age of Onset    Lupus Sister        Review of patient's allergies indicates:   Allergen Reactions    Sulfamethoxazole-trimethoprim Itching     syncopal episode as a child       Physical exam:-    GEN: awake, alert, non-diaphoretic, no psychomotor agitation, no acute distress    HEENT :Head: atraumatic, normocephalic, no rashes noted, no lesions noted;    Eyes: NO redness, discharge, swelling, or lesions    Nose: NO redness, swelling, discharge, deformity, or impetigo/crusting    Skin: no lesions, wounds, erythema, or cyanosis noted on face or hands    Cardiopulmonary: no increased respiratory effort, speaking in clear sentences    MSK: normal ROM in the neck, Upper extremities, Lower extremities  Good ROM of hands, fist formation 95% with mild fullness around wrists.    Good ambulation in front of the camera    Neuro: cranial nerves grossly normal, speech normal rate and rhythm, orientation arrived to appointment on time with no prompting, moved both upper extremities equally    Pysch:  appearance, behavior, and attitude- well groomed, pleasant, cooperative    Medication List with Changes/Refills   New Medications    AZATHIOPRINE (IMURAN) 50 MG TAB    Take 1 tablet (50 mg total) by mouth once daily.   Current Medications    BELIMUMAB (BENLYSTA) 200 MG/ML ATIN    Inject 1 mL (200 mg total) into the skin every 7 days.    BETAMETHASONE DIPROPIONATE (DIPROLENE) 0.05 % CREAM    Apply topically 2 (two) times daily as needed. For lupus    CLOBETASOL (CLOBEX) 0.05 % SHAMPOO        FERROUS SULFATE (IRON ORAL)    Take by mouth.    FLUOCINOLONE AND SHOWER CAP (DERMA-SMOOTHE/FS SCALP OIL) 0.01 % OIL    Apply 1 application topically every evening.    HYDROCORTISONE 2.5 % CREAM    Apply topically 2 (two) times  daily as needed. For lupus affecting face.    HYDROXYCHLOROQUINE (PLAQUENIL) 200 MG TABLET    Take 1 tablet (200 mg total) by mouth 2 (two) times daily.    KETOCONAZOLE (NIZORAL) 2 % SHAMPOO        PREDNISONE (DELTASONE) 5 MG TABLET    Take 1 tablet (5 mg total) by mouth once daily. Take 7.5 mg once daily for 2 weeks and then reduce to 5 mg daily thereafter.       Assessment/Plans:-  1. Systemic lupus erythematosus arthritis    2. Discoid lupus erythematosus    3. Immunosuppressed status      · Discoid lupus well controlled on Plaquenil, prednisone and Benlysta arm but she still has mild active lupus arthritis especially around her ankles and wrists.  Add Imuran to her treatment regimen.  Advised all adverse effects.  Advised contraception.  Check safety labs for Imuran in 4 weeks along with inflammatory markers.  Continue Plaquenil, Benlysta and low-dose prednisone.  ·   Problem List Items Addressed This Visit     Systemic lupus erythematosus arthritis - Primary    Relevant Medications    azaTHIOprine (IMURAN) 50 mg Tab    Discoid lupus erythematosus    Relevant Medications    azaTHIOprine (IMURAN) 50 mg Tab    Immunosuppressed status    Relevant Medications    azaTHIOprine (IMURAN) 50 mg Tab          Follow up in about 4 weeks (around 9/24/2020).    Disclaimer: This note was prepared using voice recognition system and is likely to have sound alike errors and is not proof read.  Please call me with any questions.

## 2020-08-27 NOTE — TELEPHONE ENCOUNTER
----- Message from Magan Barker MD sent at 8/27/2020  2:22 PM CDT -----  Please schedule CBC, CMP, ESR, CRP in 4 weeks and before next visit in 3 months.

## 2020-09-14 ENCOUNTER — TELEPHONE (OUTPATIENT)
Dept: PHARMACY | Facility: CLINIC | Age: 20
End: 2020-09-14

## 2020-09-15 ENCOUNTER — TELEPHONE (OUTPATIENT)
Dept: PHARMACY | Facility: CLINIC | Age: 20
End: 2020-09-15

## 2020-09-15 NOTE — TELEPHONE ENCOUNTER
Pt confirmed shipping of benlysta on  to arrive on . Address and  verified. $0 copay in 004. Pt is in need of a new sharps container. Next dose due . Pt has 0 doses on hand.  Pt reported no missed doses, side effects, or allergies. Pt has not been diagnosed with any new medical conditions. Pt started azathioprine - medication reconciliation complete. No DDI's identified. Pt had no further questions or concerns.

## 2020-09-28 ENCOUNTER — LAB VISIT (OUTPATIENT)
Dept: LAB | Facility: HOSPITAL | Age: 20
End: 2020-09-28
Attending: INTERNAL MEDICINE
Payer: OTHER GOVERNMENT

## 2020-09-28 DIAGNOSIS — D59.19 OTHER AUTOIMMUNE HEMOLYTIC ANEMIAS: ICD-10-CM

## 2020-09-28 DIAGNOSIS — M32.19 OTHER SYSTEMIC LUPUS ERYTHEMATOSUS WITH OTHER ORGAN INVOLVEMENT: ICD-10-CM

## 2020-09-28 DIAGNOSIS — L93.0 DISCOID LUPUS ERYTHEMATOSUS: ICD-10-CM

## 2020-09-28 LAB
ALBUMIN SERPL BCP-MCNC: 3.3 G/DL (ref 3.5–5.2)
ALP SERPL-CCNC: 75 U/L (ref 55–135)
ALT SERPL W/O P-5'-P-CCNC: 13 U/L (ref 10–44)
ANION GAP SERPL CALC-SCNC: 9 MMOL/L (ref 8–16)
AST SERPL-CCNC: 22 U/L (ref 10–40)
BASOPHILS # BLD AUTO: 0 K/UL (ref 0–0.2)
BASOPHILS NFR BLD: 0 % (ref 0–1.9)
BILIRUB SERPL-MCNC: 0.3 MG/DL (ref 0.1–1)
BUN SERPL-MCNC: 11 MG/DL (ref 6–20)
CALCIUM SERPL-MCNC: 8.8 MG/DL (ref 8.7–10.5)
CHLORIDE SERPL-SCNC: 105 MMOL/L (ref 95–110)
CO2 SERPL-SCNC: 25 MMOL/L (ref 23–29)
CREAT SERPL-MCNC: 0.7 MG/DL (ref 0.5–1.4)
CRP SERPL-MCNC: 16.7 MG/L (ref 0–8.2)
DIFFERENTIAL METHOD: ABNORMAL
EOSINOPHIL # BLD AUTO: 0 K/UL (ref 0–0.5)
EOSINOPHIL NFR BLD: 0 % (ref 0–8)
ERYTHROCYTE [DISTWIDTH] IN BLOOD BY AUTOMATED COUNT: 13.4 % (ref 11.5–14.5)
ERYTHROCYTE [SEDIMENTATION RATE] IN BLOOD BY WESTERGREN METHOD: 65 MM/HR (ref 0–20)
EST. GFR  (AFRICAN AMERICAN): >60 ML/MIN/1.73 M^2
EST. GFR  (NON AFRICAN AMERICAN): >60 ML/MIN/1.73 M^2
GLUCOSE SERPL-MCNC: 101 MG/DL (ref 70–110)
HCT VFR BLD AUTO: 29.6 % (ref 37–48.5)
HGB BLD-MCNC: 9.4 G/DL (ref 12–16)
IMM GRANULOCYTES # BLD AUTO: 0.01 K/UL (ref 0–0.04)
IMM GRANULOCYTES NFR BLD AUTO: 0.4 % (ref 0–0.5)
LYMPHOCYTES # BLD AUTO: 0.5 K/UL (ref 1–4.8)
LYMPHOCYTES NFR BLD: 19.2 % (ref 18–48)
MCH RBC QN AUTO: 28.9 PG (ref 27–31)
MCHC RBC AUTO-ENTMCNC: 31.8 G/DL (ref 32–36)
MCV RBC AUTO: 91 FL (ref 82–98)
MONOCYTES # BLD AUTO: 0.1 K/UL (ref 0.3–1)
MONOCYTES NFR BLD: 4.7 % (ref 4–15)
NEUTROPHILS # BLD AUTO: 1.9 K/UL (ref 1.8–7.7)
NEUTROPHILS NFR BLD: 75.7 % (ref 38–73)
NRBC BLD-RTO: 0 /100 WBC
PLATELET # BLD AUTO: 318 K/UL (ref 150–350)
PMV BLD AUTO: 9.3 FL (ref 9.2–12.9)
POTASSIUM SERPL-SCNC: 3.3 MMOL/L (ref 3.5–5.1)
PROT SERPL-MCNC: 7.9 G/DL (ref 6–8.4)
RBC # BLD AUTO: 3.25 M/UL (ref 4–5.4)
SODIUM SERPL-SCNC: 139 MMOL/L (ref 136–145)
WBC # BLD AUTO: 2.55 K/UL (ref 3.9–12.7)

## 2020-09-28 PROCEDURE — 85025 COMPLETE CBC W/AUTO DIFF WBC: CPT

## 2020-09-28 PROCEDURE — 80053 COMPREHEN METABOLIC PANEL: CPT

## 2020-09-28 PROCEDURE — 85651 RBC SED RATE NONAUTOMATED: CPT

## 2020-09-28 PROCEDURE — 86140 C-REACTIVE PROTEIN: CPT

## 2020-09-28 PROCEDURE — 36415 COLL VENOUS BLD VENIPUNCTURE: CPT

## 2020-10-06 ENCOUNTER — PATIENT MESSAGE (OUTPATIENT)
Dept: RHEUMATOLOGY | Facility: CLINIC | Age: 20
End: 2020-10-06

## 2020-10-06 DIAGNOSIS — L93.0 DISCOID LUPUS ERYTHEMATOSUS: ICD-10-CM

## 2020-10-06 DIAGNOSIS — M32.19 OTHER SYSTEMIC LUPUS ERYTHEMATOSUS WITH OTHER ORGAN INVOLVEMENT: ICD-10-CM

## 2020-10-06 DIAGNOSIS — M32.9 SYSTEMIC LUPUS ERYTHEMATOSUS ARTHRITIS: ICD-10-CM

## 2020-10-12 ENCOUNTER — TELEPHONE (OUTPATIENT)
Dept: PHARMACY | Facility: CLINIC | Age: 20
End: 2020-10-12

## 2020-10-12 RX ORDER — PREDNISONE 20 MG/1
20 TABLET ORAL DAILY
Qty: 30 TABLET | Refills: 3 | Status: SHIPPED | OUTPATIENT
Start: 2020-10-12 | End: 2020-12-01

## 2020-10-12 NOTE — TELEPHONE ENCOUNTER
Pt confirmed shipping of Benlysta on 10/12 to arrive on 10/13. Address and  verified. $0 copay in 004. Pt is in need of a new sharps container. Pt has 0 doses on hand, next dose due 10/16. Pt reported no missed doses. Pt did not start any new medications. Pt had no further questions or concerns.

## 2020-10-26 ENCOUNTER — OFFICE VISIT (OUTPATIENT)
Dept: URGENT CARE | Facility: CLINIC | Age: 20
End: 2020-10-26
Payer: OTHER GOVERNMENT

## 2020-10-26 VITALS
BODY MASS INDEX: 28.35 KG/M2 | SYSTOLIC BLOOD PRESSURE: 122 MMHG | DIASTOLIC BLOOD PRESSURE: 59 MMHG | HEART RATE: 120 BPM | WEIGHT: 160.06 LBS | OXYGEN SATURATION: 100 % | TEMPERATURE: 98 F

## 2020-10-26 DIAGNOSIS — L98.9 SKIN LESION: ICD-10-CM

## 2020-10-26 DIAGNOSIS — L93.0 DISCOID LUPUS ERYTHEMATOSUS: ICD-10-CM

## 2020-10-26 DIAGNOSIS — D84.9 IMMUNOSUPPRESSED STATUS: ICD-10-CM

## 2020-10-26 DIAGNOSIS — M32.9 SYSTEMIC LUPUS ERYTHEMATOSUS ARTHRITIS: ICD-10-CM

## 2020-10-26 DIAGNOSIS — L02.91 ABSCESS: Primary | ICD-10-CM

## 2020-10-26 PROCEDURE — 87077 CULTURE AEROBIC IDENTIFY: CPT

## 2020-10-26 PROCEDURE — 99214 OFFICE O/P EST MOD 30 MIN: CPT | Mod: PBBFAC,PO | Performed by: NURSE PRACTITIONER

## 2020-10-26 PROCEDURE — 87070 CULTURE OTHR SPECIMN AEROBIC: CPT

## 2020-10-26 PROCEDURE — 99214 PR OFFICE/OUTPT VISIT, EST, LEVL IV, 30-39 MIN: ICD-10-PCS | Mod: S$PBB,,, | Performed by: NURSE PRACTITIONER

## 2020-10-26 PROCEDURE — 99999 PR PBB SHADOW E&M-EST. PATIENT-LVL IV: CPT | Mod: PBBFAC,,, | Performed by: NURSE PRACTITIONER

## 2020-10-26 PROCEDURE — 87186 SC STD MICRODIL/AGAR DIL: CPT

## 2020-10-26 PROCEDURE — 99999 PR PBB SHADOW E&M-EST. PATIENT-LVL IV: ICD-10-PCS | Mod: PBBFAC,,, | Performed by: NURSE PRACTITIONER

## 2020-10-26 PROCEDURE — 99214 OFFICE O/P EST MOD 30 MIN: CPT | Mod: S$PBB,,, | Performed by: NURSE PRACTITIONER

## 2020-10-26 RX ORDER — CLINDAMYCIN HYDROCHLORIDE 300 MG/1
300 CAPSULE ORAL 2 TIMES DAILY
Qty: 20 CAPSULE | Refills: 0 | Status: SHIPPED | OUTPATIENT
Start: 2020-10-26 | End: 2020-11-05

## 2020-10-26 RX ORDER — MUPIROCIN 20 MG/G
OINTMENT TOPICAL 2 TIMES DAILY
Qty: 30 G | Refills: 0 | Status: SHIPPED | OUTPATIENT
Start: 2020-10-26 | End: 2020-11-24 | Stop reason: SDUPTHER

## 2020-10-26 NOTE — PATIENT INSTRUCTIONS
PLAN: Lab work C&S of wound  Consult Rheumatology  Advised warm soaks  Advise over-the-counter Hibiclens  Meds:  Clindamycin, Bactroban/ no refills  Practice good handwashing.  Advise follow up with PCP in 2-3 days for recheck  Advise go to ER if nausea, vomiting, fever, increased pain, or fail to improve with treatment.  AVS provided and reviewed with patient including supportive care, follow up, and red flag symptoms.   Patient verbalizes understanding and agrees with treatment plan. Discharged from Urgent Care in stable condition.

## 2020-10-26 NOTE — PROGRESS NOTES
Chief complaint/reason for visit:   painful sores    HISTORY OF PRESENT ILLNESS:   20 year-old female complains of painful sores to right thigh and left upper thigh onset for 1-2 weeks.  Complains of skin lesions pain burning sensation.  Patient admits sores started draining blood-tinged fluid.  Patient concerned possible infection.  Denies chest pain, shortness of breath, dizziness, nausea, vomiting, diarrhea, abdominal pain, fatigue, decreased sense smell and taste, and no fever.         Past Medical History:   Diagnosis Date    Lupus          No past surgical history on file.         Social History     Socioeconomic History    Marital status: Single     Spouse name: Not on file    Number of children: Not on file    Years of education: Not on file    Highest education level: Not on file   Occupational History    Not on file   Social Needs    Financial resource strain: Not on file    Food insecurity     Worry: Not on file     Inability: Not on file    Transportation needs     Medical: Not on file     Non-medical: Not on file   Tobacco Use    Smoking status: Never Smoker    Smokeless tobacco: Never Used   Substance and Sexual Activity    Alcohol use: Never     Frequency: Never    Drug use: Never    Sexual activity: Not on file   Lifestyle    Physical activity     Days per week: Not on file     Minutes per session: Not on file    Stress: Not on file   Relationships    Social connections     Talks on phone: Not on file     Gets together: Not on file     Attends Faith service: Not on file     Active member of club or organization: Not on file     Attends meetings of clubs or organizations: Not on file     Relationship status: Not on file   Other Topics Concern    Not on file   Social History Narrative    Not on file       Family History   Problem Relation Age of Onset    Lupus Sister          ROS:  GENERAL: No fever, chills, fatigability or weight loss.  SKIN: Reports painful  sore to back of  thighs.  NODES: No masses or lesions. Denies swollen glands.  CHEST: Denies cyanosis, wheezing, cough and sputum production.  CARDIOVASCULAR: Denies chest pain, PND, orthopnea or reduced exercise tolerance.  NEUROLOGIC: No history of seizures, paralysis, alteration of gait or coordination.  PSYCHIATRIC: Denies mood swings, depression or suicidal thoughts.    PE:   APPEARANCE: Well nourished, well developed, in mild distress.  Temp 97.9°, pulse ox 100%  V/S: Reviewed.  SKIN:  Multiple minimal red ulcerations left upper thigh, minimal DC  C&S obtained, sterile dressing, tenderness on palpation, with minimal soft tissue swelling.  Right posterior thigh multiple skin lesions      CHEST:  No respiratory symptoms  CARDIOVASCULAR: Regular rate and rhythm   NEUROLOGIC: No sensory deficits. Gait & Posture: Normal gait and fine motion. No cerebellar signs.  MENTAL STATUS: Patient alert, oriented x 3 & conversant.    PLAN: Lab work C&S of wound  Advised warm soaks  Advise over-the-counter Hibiclens  Meds:  Clindamycin, Bactroban/ no refills  Practice good handwashing.  Advise follow up with PCP in 2-3 days for recheck  Advise go to ER if nausea, vomiting, fever, increased pain, or fail to improve with treatment.  AVS provided and reviewed with patient including supportive care, follow up, and red flag symptoms.   Patient verbalizes understanding and agrees with treatment plan. Discharged from Urgent Care in stable condition.        DIAGNOSIS:  Skin lesion  Abscess vs cellulitis  Immunosuppressed  Discoid lupus erythematous  Systemic lupus erythematosus arthritis

## 2020-10-29 LAB — BACTERIA SPEC AEROBE CULT: ABNORMAL

## 2020-11-09 DIAGNOSIS — M32.9 SYSTEMIC LUPUS ERYTHEMATOSUS ARTHRITIS: ICD-10-CM

## 2020-11-09 DIAGNOSIS — M32.19 OTHER SYSTEMIC LUPUS ERYTHEMATOSUS WITH OTHER ORGAN INVOLVEMENT: ICD-10-CM

## 2020-11-09 RX ORDER — BELIMUMAB 200 MG/ML
200 SOLUTION SUBCUTANEOUS
Qty: 4 ML | Refills: 3 | OUTPATIENT
Start: 2020-11-09 | End: 2020-11-10 | Stop reason: SDUPTHER

## 2020-11-10 ENCOUNTER — TELEPHONE (OUTPATIENT)
Dept: PHARMACY | Facility: CLINIC | Age: 20
End: 2020-11-10

## 2020-11-10 ENCOUNTER — LAB VISIT (OUTPATIENT)
Dept: LAB | Facility: HOSPITAL | Age: 20
End: 2020-11-10
Attending: FAMILY MEDICINE
Payer: OTHER GOVERNMENT

## 2020-11-10 ENCOUNTER — OFFICE VISIT (OUTPATIENT)
Dept: INTERNAL MEDICINE | Facility: CLINIC | Age: 20
End: 2020-11-10
Payer: OTHER GOVERNMENT

## 2020-11-10 VITALS
OXYGEN SATURATION: 99 % | HEIGHT: 63 IN | BODY MASS INDEX: 28.56 KG/M2 | HEART RATE: 112 BPM | DIASTOLIC BLOOD PRESSURE: 64 MMHG | RESPIRATION RATE: 18 BRPM | SYSTOLIC BLOOD PRESSURE: 108 MMHG | TEMPERATURE: 98 F | WEIGHT: 161.19 LBS

## 2020-11-10 DIAGNOSIS — E87.6 LOW SERUM POTASSIUM LEVEL: ICD-10-CM

## 2020-11-10 DIAGNOSIS — R00.0 TACHYCARDIA: ICD-10-CM

## 2020-11-10 DIAGNOSIS — D64.9 ANEMIA, UNSPECIFIED TYPE: ICD-10-CM

## 2020-11-10 DIAGNOSIS — M32.19 OTHER SYSTEMIC LUPUS ERYTHEMATOSUS WITH OTHER ORGAN INVOLVEMENT: ICD-10-CM

## 2020-11-10 DIAGNOSIS — Z13.6 ENCOUNTER FOR LIPID SCREENING FOR CARDIOVASCULAR DISEASE: ICD-10-CM

## 2020-11-10 DIAGNOSIS — M32.9 SYSTEMIC LUPUS ERYTHEMATOSUS ARTHRITIS: ICD-10-CM

## 2020-11-10 DIAGNOSIS — E87.6 LOW SERUM POTASSIUM LEVEL: Primary | ICD-10-CM

## 2020-11-10 DIAGNOSIS — Z13.220 ENCOUNTER FOR LIPID SCREENING FOR CARDIOVASCULAR DISEASE: ICD-10-CM

## 2020-11-10 LAB
ALBUMIN SERPL BCP-MCNC: 3.1 G/DL (ref 3.5–5.2)
ALP SERPL-CCNC: 73 U/L (ref 55–135)
ALT SERPL W/O P-5'-P-CCNC: 36 U/L (ref 10–44)
ANION GAP SERPL CALC-SCNC: 8 MMOL/L (ref 8–16)
AST SERPL-CCNC: 66 U/L (ref 10–40)
BILIRUB SERPL-MCNC: 0.4 MG/DL (ref 0.1–1)
BUN SERPL-MCNC: 11 MG/DL (ref 6–20)
CALCIUM SERPL-MCNC: 8.6 MG/DL (ref 8.7–10.5)
CHLORIDE SERPL-SCNC: 108 MMOL/L (ref 95–110)
CHOLEST SERPL-MCNC: 163 MG/DL (ref 120–199)
CHOLEST/HDLC SERPL: 2.5 {RATIO} (ref 2–5)
CO2 SERPL-SCNC: 27 MMOL/L (ref 23–29)
CREAT SERPL-MCNC: 0.7 MG/DL (ref 0.5–1.4)
EST. GFR  (AFRICAN AMERICAN): >60 ML/MIN/1.73 M^2
EST. GFR  (NON AFRICAN AMERICAN): >60 ML/MIN/1.73 M^2
FERRITIN SERPL-MCNC: 490 NG/ML (ref 20–300)
GLUCOSE SERPL-MCNC: 97 MG/DL (ref 70–110)
HDLC SERPL-MCNC: 65 MG/DL (ref 40–75)
HDLC SERPL: 39.9 % (ref 20–50)
IRON SERPL-MCNC: 53 UG/DL (ref 30–160)
LDLC SERPL CALC-MCNC: 84 MG/DL (ref 63–159)
MAGNESIUM SERPL-MCNC: 1.6 MG/DL (ref 1.6–2.6)
NONHDLC SERPL-MCNC: 98 MG/DL
PATH REV BLD -IMP: NORMAL
POTASSIUM SERPL-SCNC: 3.5 MMOL/L (ref 3.5–5.1)
PROT SERPL-MCNC: 7.9 G/DL (ref 6–8.4)
SATURATED IRON: 16 % (ref 20–50)
SODIUM SERPL-SCNC: 143 MMOL/L (ref 136–145)
TOTAL IRON BINDING CAPACITY: 324 UG/DL (ref 250–450)
TRANSFERRIN SERPL-MCNC: 219 MG/DL (ref 200–375)
TRIGL SERPL-MCNC: 70 MG/DL (ref 30–150)
TSH SERPL DL<=0.005 MIU/L-ACNC: 1.75 UIU/ML (ref 0.4–4)

## 2020-11-10 PROCEDURE — 84443 ASSAY THYROID STIM HORMONE: CPT

## 2020-11-10 PROCEDURE — 36415 COLL VENOUS BLD VENIPUNCTURE: CPT

## 2020-11-10 PROCEDURE — 83540 ASSAY OF IRON: CPT

## 2020-11-10 PROCEDURE — 80053 COMPREHEN METABOLIC PANEL: CPT

## 2020-11-10 PROCEDURE — 99214 PR OFFICE/OUTPT VISIT, EST, LEVL IV, 30-39 MIN: ICD-10-PCS | Mod: S$PBB,,, | Performed by: FAMILY MEDICINE

## 2020-11-10 PROCEDURE — 99999 PR PBB SHADOW E&M-EST. PATIENT-LVL IV: ICD-10-PCS | Mod: PBBFAC,,, | Performed by: FAMILY MEDICINE

## 2020-11-10 PROCEDURE — 85060 PATHOLOGIST REVIEW: ICD-10-PCS | Mod: ,,, | Performed by: PATHOLOGY

## 2020-11-10 PROCEDURE — 85397 CLOTTING FUNCT ACTIVITY: CPT

## 2020-11-10 PROCEDURE — 99214 OFFICE O/P EST MOD 30 MIN: CPT | Mod: PBBFAC,25 | Performed by: FAMILY MEDICINE

## 2020-11-10 PROCEDURE — 93010 EKG 12-LEAD: ICD-10-PCS | Mod: S$PBB,,, | Performed by: INTERNAL MEDICINE

## 2020-11-10 PROCEDURE — 83735 ASSAY OF MAGNESIUM: CPT

## 2020-11-10 PROCEDURE — 85060 BLOOD SMEAR INTERPRETATION: CPT | Mod: ,,, | Performed by: PATHOLOGY

## 2020-11-10 PROCEDURE — 99214 OFFICE O/P EST MOD 30 MIN: CPT | Mod: S$PBB,,, | Performed by: FAMILY MEDICINE

## 2020-11-10 PROCEDURE — 85025 COMPLETE CBC W/AUTO DIFF WBC: CPT

## 2020-11-10 PROCEDURE — 82728 ASSAY OF FERRITIN: CPT

## 2020-11-10 PROCEDURE — 93005 ELECTROCARDIOGRAM TRACING: CPT | Mod: PBBFAC | Performed by: INTERNAL MEDICINE

## 2020-11-10 PROCEDURE — 93010 ELECTROCARDIOGRAM REPORT: CPT | Mod: S$PBB,,, | Performed by: INTERNAL MEDICINE

## 2020-11-10 PROCEDURE — 99999 PR PBB SHADOW E&M-EST. PATIENT-LVL IV: CPT | Mod: PBBFAC,,, | Performed by: FAMILY MEDICINE

## 2020-11-10 PROCEDURE — 80061 LIPID PANEL: CPT

## 2020-11-10 RX ORDER — BELIMUMAB 200 MG/ML
200 SOLUTION SUBCUTANEOUS
Qty: 4 ML | Refills: 3 | OUTPATIENT
Start: 2020-11-10 | End: 2020-11-11 | Stop reason: SDUPTHER

## 2020-11-10 NOTE — TELEPHONE ENCOUNTER
11/10 WWB  Incoming call from pt for refill of benlysta. Refill request has been sent to [facility], pt.'s next dose is on 11/13.

## 2020-11-10 NOTE — PROGRESS NOTES
Godfrey Rosa MD  Patient Care Team:  Godfrey Rosa MD as PCP - General (Family Medicine)    Has the patient seen any provider outside of the network since the last visit ? (yes). If yes, HIPPA forms completed and records requested.  Dr. Tim Singh in [place]    Visit Type:est care    Chief Complaint:  Chief Complaint   Patient presents with    Establish Care     pt complains of sore forming on legs and arm, states they feel like they are burning x2       History of Present Illness:  HPI     Establish Care      Additional comments: pt complains of sore forming on legs and arm,   states they feel like they are burning x2          Last edited by Jessica Tracy MA on 11/10/2020  4:05 PM. (History)      Sees rheum for lupus  Dx in march  Started with hair loss and facial rashes, fevers    Pos depression screening using PHQ2  Stressing about school and health issues     How often do you have a drink containing Alcohol? denied     Do you exercise  (no ) not active    Do you take a baby Aspirin daily ( no)    Do you have an advance directive ( no ) The patient was given information regarding Living Will/Durable Power-of- if requested.     The following were reviewed: Active problem list, medication list, allergies, family history, social history, and Health Maintenance.     Medications have been reviewed and reconciled with patient at visit today.    Exam:  Vitals:    11/10/20 1601   BP: 108/64   Pulse: (!) 112   Resp: 18   Temp: 97.9 °F (36.6 °C)     Weight: 73.1 kg (161 lb 2.5 oz)   Body mass index is 28.55 kg/m².    ROS  Denies menorrhagia  Denies blood in stool  Reports taking iron supplement, otc  All pertinent positives and negatives as in HPI  Pt has completed a full 14 system review. All items are negative except as indicated.    Physical Exam  Vitals signs reviewed.   Constitutional:       General: She is not in acute distress.     Appearance: Normal appearance. She is well-developed. She is not  ill-appearing or toxic-appearing.   HENT:      Head: Normocephalic and atraumatic.      Right Ear: Hearing normal.      Left Ear: Hearing normal.   Neck:      Musculoskeletal: Normal range of motion.   Cardiovascular:      Rate and Rhythm: Tachycardia present.   Pulmonary:      Effort: Pulmonary effort is normal. No respiratory distress.   Abdominal:      Palpations: Abdomen is soft.   Musculoskeletal: Normal range of motion.   Skin:     General: Skin is warm and dry.   Neurological:      Mental Status: She is alert and oriented to person, place, and time.   Psychiatric:         Behavior: Behavior normal.         Laboratory Reviewed: (Yes)  Lab Results   Component Value Date    WBC 2.55 (L) 09/28/2020    HGB 9.4 (L) 09/28/2020    HCT 29.6 (L) 09/28/2020     09/28/2020    ALT 13 09/28/2020    AST 22 09/28/2020     09/28/2020    K 3.3 (L) 09/28/2020     09/28/2020    CREATININE 0.7 09/28/2020    BUN 11 09/28/2020    CO2 25 09/28/2020    TSH 2.546 03/06/2020       Assessment:  1. Low serum potassium level    2. Anemia, unspecified type    3. Encounter for lipid screening for cardiovascular disease    4. Tachycardia          Plan:  Problem List Items Addressed This Visit     None      Visit Diagnoses     Low serum potassium level    -  Primary    Relevant Orders    Comprehensive Metabolic Panel    Magnesium    Anemia, unspecified type        Relevant Orders    CBC Auto Differential    Von Willebrand Factor Activity, Plasma    Iron and TIBC    Ferritin    Pathologist Interpretation Differential    Encounter for lipid screening for cardiovascular disease        Relevant Orders    Lipid Panel    Tachycardia        Relevant Orders    EKG 12-lead    TSH            Follow up: Follow up in about 2 weeks (around 11/24/2020) for sign [facility] notes/labs, Follow Up, nonfasting.    After visit summary printed and given to patient upon discharge.

## 2020-11-10 NOTE — TELEPHONE ENCOUNTER
Current Outpatient Medications   Medication Sig    belimumab (BENLYSTA) 200 mg/mL AtIn Inject 1 mL (200 mg total) into the skin every 7 days.    betamethasone dipropionate (DIPROLENE) 0.05 % cream Apply topically 2 (two) times daily as needed. For lupus    clobetasoL (CLOBEX) 0.05 % shampoo     ferrous sulfate (IRON ORAL) Take by mouth.    fluocinolone and shower cap (DERMA-SMOOTHE/FS SCALP OIL) 0.01 % Oil Apply 1 application topically every evening.    hydrocortisone 2.5 % cream Apply topically 2 (two) times daily as needed. For lupus affecting face.    hydroxychloroquine (PLAQUENIL) 200 mg tablet Take 1 tablet (200 mg total) by mouth 2 (two) times daily.    ketoconazole (NIZORAL) 2 % shampoo     mupirocin (BACTROBAN) 2 % ointment Apply topically 2 (two) times daily.    predniSONE (DELTASONE) 20 MG tablet Take 1 tablet (20 mg total) by mouth once daily.   Last reviewed on 10/26/2020  3:25 PM by Cris Rodriguez NP    Review of patient's allergies indicates:   Allergen Reactions    Sulfamethoxazole-trimethoprim Itching     syncopal episode as a child    Last reviewed on  10/26/2020 3:25 PM by Cris Rodriguez      Tasks added this encounter   No tasks added.   Tasks due within next 3 months   No tasks due.   11/10 WW  Incoming call from pt for refill of benlysta. Refill request has been sent to [facility], pt.'s next dose is on 11/13.     [facility]  [address] [place] [id number]  Phone: [phone]  Fax: [phone]                Current Outpatient Medications   Medication Sig    belimumab (BENLYSTA) 200 mg/mL AtIn Inject 1 mL (200 mg total) into the skin every 7 days.    betamethasone dipropionate (DIPROLENE) 0.05 % cream Apply topically 2 (two) times daily as needed. For lupus    clobetasoL (CLOBEX) 0.05 % shampoo     ferrous sulfate (IRON ORAL) Take by mouth.    fluocinolone and shower cap (DERMA-SMOOTHE/FS SCALP OIL)  INPATIENT DISCHARGE SUMMARY  2/29/2020    Patient'S Name: Constantino Warren    Admitting Physician of Record: Kang Cleveland MD    Discharging Physician: Kang Cleveland MD    Date of admission: 2/24/2020     Date of discharge: 2/29/2020    Admitting diagnosis: Colon cancer, depression    Discharge diagnosis: Same    Procedures: Procedure(s): Right hemicolectomy  INSERTION, VASCULAR ACCESS PORT LEFT    Consultants: None    Hospital course: The patient was admitted to the hospital and taken to the operating room and underwent an Procedure(s): Right hemicolectomy.  The patient tolerated the procedure(s) well and was transferred to the [facility].  Her postoperative course has been completely unremarkable.  At the time of discharge she is eating a Regular diet, is having good pain control on oral medications, and is passing gas.  Her final pathology returned as a T3, N1A, MX stage IIIb colon cancer.  Because of that prior to discharge a Mediport was placed without difficulty.  The patient will be discharged home in good condition.    Of note the patient does have a history of depression.  That this was not treated or addressed during this hospitalization.  Overall her symptoms remained stable.    Discharge instructions include:    Patient will be discharged to Home   Diet:   Active Diet and Nourishment Order   Procedures     Regular Diet Adult     Diet      Activity : no lifting restrictions and no liting over 10 pounds for 6 weeks   Follow-up:     The patient will follow up with her primary care provider in 1 weeks.      The patient will follow up with me in 1 weeks.   Medications include:    All prior medications    Oxycodone (Norco) for pain control.     0.01 % Oil Apply 1 application topically every evening.    hydrocortisone 2.5 % cream Apply topically 2 (two) times daily as needed. For lupus affecting face.    hydroxychloroquine (PLAQUENIL) 200 mg tablet Take 1 tablet (200 mg total) by mouth 2 (two) times daily.    ketoconazole (NIZORAL) 2 % shampoo     mupirocin (BACTROBAN) 2 % ointment Apply topically 2 (two) times daily.    predniSONE (DELTASONE) 20 MG tablet Take 1 tablet (20 mg total) by mouth once daily.   Last reviewed on 10/26/2020  3:25 PM by Cris Rodriguez NP    Review of patient's allergies indicates:   Allergen Reactions    Sulfamethoxazole-trimethoprim Itching     syncopal episode as a child    Last reviewed on  10/26/2020 3:25 PM by Cris Rodriguez      Tasks added this encounter   No tasks added.   Tasks due within next 3 months   No tasks due.     [facility]  [address] [place] [id number]  Phone: [phone]  Fax: [phone]

## 2020-11-11 DIAGNOSIS — M32.9 SYSTEMIC LUPUS ERYTHEMATOSUS ARTHRITIS: ICD-10-CM

## 2020-11-11 DIAGNOSIS — M32.19 OTHER SYSTEMIC LUPUS ERYTHEMATOSUS WITH OTHER ORGAN INVOLVEMENT: ICD-10-CM

## 2020-11-11 LAB
BASOPHILS # BLD AUTO: 0 K/UL (ref 0–0.2)
BASOPHILS NFR BLD: 0 % (ref 0–1.9)
DIFFERENTIAL METHOD: ABNORMAL
EOSINOPHIL # BLD AUTO: 0 K/UL (ref 0–0.5)
EOSINOPHIL NFR BLD: 0 % (ref 0–8)
ERYTHROCYTE [DISTWIDTH] IN BLOOD BY AUTOMATED COUNT: 14.6 % (ref 11.5–14.5)
HCT VFR BLD AUTO: 30.9 % (ref 37–48.5)
HGB BLD-MCNC: 9.2 G/DL (ref 12–16)
IMM GRANULOCYTES # BLD AUTO: 0.02 K/UL (ref 0–0.04)
IMM GRANULOCYTES NFR BLD AUTO: 0.4 % (ref 0–0.5)
LYMPHOCYTES # BLD AUTO: 0.2 K/UL (ref 1–4.8)
LYMPHOCYTES NFR BLD: 3.7 % (ref 18–48)
MCH RBC QN AUTO: 29.8 PG (ref 27–31)
MCHC RBC AUTO-ENTMCNC: 29.8 G/DL (ref 32–36)
MCV RBC AUTO: 100 FL (ref 82–98)
MONOCYTES # BLD AUTO: 0.1 K/UL (ref 0.3–1)
MONOCYTES NFR BLD: 2.7 % (ref 4–15)
NEUTROPHILS # BLD AUTO: 4.5 K/UL (ref 1.8–7.7)
NEUTROPHILS NFR BLD: 93.2 % (ref 38–73)
NRBC BLD-RTO: 0 /100 WBC
PATH REV BLD -IMP: NORMAL
PLATELET # BLD AUTO: 243 K/UL (ref 150–350)
PMV BLD AUTO: 10.8 FL (ref 9.2–12.9)
RBC # BLD AUTO: 3.09 M/UL (ref 4–5.4)
WBC # BLD AUTO: 4.86 K/UL (ref 3.9–12.7)

## 2020-11-11 RX ORDER — BELIMUMAB 200 MG/ML
200 SOLUTION SUBCUTANEOUS
Qty: 4 ML | Refills: 3 | Status: SHIPPED | OUTPATIENT
Start: 2020-11-11 | End: 2020-12-08

## 2020-11-12 DIAGNOSIS — D64.9 ANEMIA, UNSPECIFIED TYPE: Primary | ICD-10-CM

## 2020-11-13 ENCOUNTER — LAB VISIT (OUTPATIENT)
Dept: LAB | Facility: HOSPITAL | Age: 20
End: 2020-11-13
Attending: FAMILY MEDICINE
Payer: OTHER GOVERNMENT

## 2020-11-13 DIAGNOSIS — D64.9 ANEMIA, UNSPECIFIED TYPE: ICD-10-CM

## 2020-11-13 LAB
BILIRUB DIRECT SERPL-MCNC: 0.2 MG/DL (ref 0.1–0.3)
BILIRUB SERPL-MCNC: 0.2 MG/DL (ref 0.1–1)
DAT IGG-SP REAG RBC-IMP: NORMAL
FOLATE SERPL-MCNC: 13.1 NG/ML (ref 4–24)
HAPTOGLOB SERPL-MCNC: 248 MG/DL (ref 30–250)
LDH SERPL L TO P-CCNC: 352 U/L (ref 110–260)
RETICS/RBC NFR AUTO: 1.6 % (ref 0.5–2.5)
VWF:AC ACT/NOR PPP IA: 232 % (ref 55–200)

## 2020-11-13 PROCEDURE — 82746 ASSAY OF FOLIC ACID SERUM: CPT

## 2020-11-13 PROCEDURE — 82248 BILIRUBIN DIRECT: CPT

## 2020-11-13 PROCEDURE — 83921 ORGANIC ACID SINGLE QUANT: CPT

## 2020-11-13 PROCEDURE — 36415 COLL VENOUS BLD VENIPUNCTURE: CPT

## 2020-11-13 PROCEDURE — 85045 AUTOMATED RETICULOCYTE COUNT: CPT

## 2020-11-13 PROCEDURE — 83010 ASSAY OF HAPTOGLOBIN QUANT: CPT

## 2020-11-13 PROCEDURE — 86880 COOMBS TEST DIRECT: CPT

## 2020-11-13 PROCEDURE — 83615 LACTATE (LD) (LDH) ENZYME: CPT

## 2020-11-13 PROCEDURE — 82247 BILIRUBIN TOTAL: CPT

## 2020-11-16 ENCOUNTER — TELEPHONE (OUTPATIENT)
Dept: RHEUMATOLOGY | Facility: CLINIC | Age: 20
End: 2020-11-16

## 2020-11-16 DIAGNOSIS — M32.9 SYSTEMIC LUPUS ERYTHEMATOSUS ARTHRITIS: ICD-10-CM

## 2020-11-16 DIAGNOSIS — L93.0 DISCOID LUPUS ERYTHEMATOSUS: Primary | ICD-10-CM

## 2020-11-16 RX ORDER — METHYLPREDNISOLONE 4 MG/1
TABLET ORAL
Qty: 1 PACKAGE | Refills: 0 | Status: SHIPPED | OUTPATIENT
Start: 2020-11-16 | End: 2020-12-08 | Stop reason: ALTCHOICE

## 2020-11-16 NOTE — TELEPHONE ENCOUNTER
Pt is very upset. Naomi was supposed to arrive by [employer] on Friday but did not, pt spoke with [facility] and they said it should arrive today. She also mentioned she would go into [facility] to get a shot thinking that the shot would be  before she receives it.   Pt mention she is starting to get skin issues

## 2020-11-16 NOTE — TELEPHONE ENCOUNTER
----- Message from Mihaela Herman sent at 11/16/2020  2:25 PM CST -----  Contact: Dilcia Culp called in regards to belimumab (BENLYSTA) 200 mg/mL. Gets injection every Friday & [employer] did not deliver. Wants to know if she can come in to the office to get injection. Worried if she gets off track she will wind up in the hospital. Please call back at [phone]. Thanks jh

## 2020-11-16 NOTE — TELEPHONE ENCOUNTER
Advice to take medrol dose pack if she flares while waiting to get the benlysta. Benlysta not available to be given in the clinic. Thanks.

## 2020-11-17 LAB — METHYLMALONATE SERPL-SCNC: 0.11 UMOL/L

## 2020-11-17 NOTE — TELEPHONE ENCOUNTER
Patient's Benlysta that shipped from [facility] on 11/12 was not delivered by [employer] as scheduled.      of Benlysta granted replacement box. Mercedes is the rep from [employer] that granted this replacement box.  The case # is [id number].    [facility] sent out replacement Rx on 11/16.    Counseled patient on the late dose- pt was supposed to inject 11/13 but will be injecting 11/17.  Pt counseled to inject every Tuesday moving forward.       Per tracking # of the 11/12 shipment , Rx that needs to be disposed of, Benylsta has still not arrived to patient's house.  Patient needs to be counseled to check tracking # as she will potentially receive 2 shipments today.    Called patient to in form her- LVM:     11/12 shipment - tracking # [id number]- Benlysta in this shipment needs to be disposed of as Rx is not considered stable anymore    11/16 shipment- tracking # [id number]- This is the replacement Benlysta.

## 2020-11-17 NOTE — TELEPHONE ENCOUNTER
Spoke with patient- 2 shipments may arrive today.     11/12 shipment - tracking # [id number]- Benlysta in this shipment needs to be disposed of as Rx is not considered stable anymore     11/16 shipment- tracking # [id number]- This is the replacement Benlysta.

## 2020-11-18 DIAGNOSIS — D64.9 ANEMIA, UNSPECIFIED TYPE: Primary | ICD-10-CM

## 2020-11-24 ENCOUNTER — OFFICE VISIT (OUTPATIENT)
Dept: INTERNAL MEDICINE | Facility: CLINIC | Age: 20
End: 2020-11-24
Payer: OTHER GOVERNMENT

## 2020-11-24 ENCOUNTER — LAB VISIT (OUTPATIENT)
Dept: LAB | Facility: HOSPITAL | Age: 20
End: 2020-11-24
Attending: FAMILY MEDICINE
Payer: OTHER GOVERNMENT

## 2020-11-24 ENCOUNTER — TELEPHONE (OUTPATIENT)
Dept: RHEUMATOLOGY | Facility: CLINIC | Age: 20
End: 2020-11-24

## 2020-11-24 VITALS
SYSTOLIC BLOOD PRESSURE: 118 MMHG | BODY MASS INDEX: 28.11 KG/M2 | WEIGHT: 158.63 LBS | OXYGEN SATURATION: 100 % | DIASTOLIC BLOOD PRESSURE: 50 MMHG | HEART RATE: 152 BPM | TEMPERATURE: 97 F | HEIGHT: 63 IN

## 2020-11-24 DIAGNOSIS — M32.9 SYSTEMIC LUPUS ERYTHEMATOSUS ARTHRITIS: ICD-10-CM

## 2020-11-24 DIAGNOSIS — M32.19 OTHER SYSTEMIC LUPUS ERYTHEMATOSUS WITH OTHER ORGAN INVOLVEMENT: ICD-10-CM

## 2020-11-24 DIAGNOSIS — D59.19 OTHER AUTOIMMUNE HEMOLYTIC ANEMIA: ICD-10-CM

## 2020-11-24 DIAGNOSIS — L93.0 DISCOID LUPUS ERYTHEMATOSUS: ICD-10-CM

## 2020-11-24 DIAGNOSIS — E87.6 HYPOKALEMIA: ICD-10-CM

## 2020-11-24 DIAGNOSIS — D64.9 SYMPTOMATIC ANEMIA: Primary | ICD-10-CM

## 2020-11-24 DIAGNOSIS — R00.0 TACHYCARDIA: ICD-10-CM

## 2020-11-24 DIAGNOSIS — L98.9 SKIN LESION: ICD-10-CM

## 2020-11-24 DIAGNOSIS — D84.9 IMMUNOSUPPRESSED STATUS: ICD-10-CM

## 2020-11-24 DIAGNOSIS — F41.9 ANXIETY: ICD-10-CM

## 2020-11-24 DIAGNOSIS — L98.9 DERMATOLOGIC PROBLEM: ICD-10-CM

## 2020-11-24 PROBLEM — Z30.09 ENCOUNTER FOR GENERAL COUNSELING AND ADVICE ON CONTRACEPTIVE MANAGEMENT: Status: RESOLVED | Noted: 2020-05-26 | Resolved: 2020-11-24

## 2020-11-24 LAB
ALBUMIN SERPL BCP-MCNC: 3.3 G/DL (ref 3.5–5.2)
ALP SERPL-CCNC: 75 U/L (ref 55–135)
ALT SERPL W/O P-5'-P-CCNC: 32 U/L (ref 10–44)
ANION GAP SERPL CALC-SCNC: 9 MMOL/L (ref 8–16)
AST SERPL-CCNC: 58 U/L (ref 10–40)
BASOPHILS # BLD AUTO: 0 K/UL (ref 0–0.2)
BASOPHILS NFR BLD: 0 % (ref 0–1.9)
BILIRUB SERPL-MCNC: 0.3 MG/DL (ref 0.1–1)
BUN SERPL-MCNC: 16 MG/DL (ref 6–20)
CALCIUM SERPL-MCNC: 9.2 MG/DL (ref 8.7–10.5)
CHLORIDE SERPL-SCNC: 107 MMOL/L (ref 95–110)
CO2 SERPL-SCNC: 25 MMOL/L (ref 23–29)
CREAT SERPL-MCNC: 0.8 MG/DL (ref 0.5–1.4)
CRP SERPL-MCNC: 10.8 MG/L (ref 0–8.2)
DIFFERENTIAL METHOD: ABNORMAL
EOSINOPHIL # BLD AUTO: 0 K/UL (ref 0–0.5)
EOSINOPHIL NFR BLD: 0 % (ref 0–8)
ERYTHROCYTE [DISTWIDTH] IN BLOOD BY AUTOMATED COUNT: 13.4 % (ref 11.5–14.5)
ERYTHROCYTE [SEDIMENTATION RATE] IN BLOOD BY WESTERGREN METHOD: 64 MM/HR (ref 0–20)
EST. GFR  (AFRICAN AMERICAN): >60 ML/MIN/1.73 M^2
EST. GFR  (NON AFRICAN AMERICAN): >60 ML/MIN/1.73 M^2
GLUCOSE SERPL-MCNC: 97 MG/DL (ref 70–110)
HCT VFR BLD AUTO: 30.1 % (ref 37–48.5)
HGB BLD-MCNC: 9.7 G/DL (ref 12–16)
IMM GRANULOCYTES # BLD AUTO: 0.04 K/UL (ref 0–0.04)
IMM GRANULOCYTES NFR BLD AUTO: 0.7 % (ref 0–0.5)
LYMPHOCYTES # BLD AUTO: 0.4 K/UL (ref 1–4.8)
LYMPHOCYTES NFR BLD: 6 % (ref 18–48)
MCH RBC QN AUTO: 30.7 PG (ref 27–31)
MCHC RBC AUTO-ENTMCNC: 32.2 G/DL (ref 32–36)
MCV RBC AUTO: 95 FL (ref 82–98)
MONOCYTES # BLD AUTO: 0.2 K/UL (ref 0.3–1)
MONOCYTES NFR BLD: 3.3 % (ref 4–15)
NEUTROPHILS # BLD AUTO: 5.2 K/UL (ref 1.8–7.7)
NEUTROPHILS NFR BLD: 90 % (ref 38–73)
NRBC BLD-RTO: 0 /100 WBC
PLATELET # BLD AUTO: 220 K/UL (ref 150–350)
PMV BLD AUTO: 9.7 FL (ref 9.2–12.9)
POTASSIUM SERPL-SCNC: 3.5 MMOL/L (ref 3.5–5.1)
PROT SERPL-MCNC: 8.5 G/DL (ref 6–8.4)
RBC # BLD AUTO: 3.16 M/UL (ref 4–5.4)
SODIUM SERPL-SCNC: 141 MMOL/L (ref 136–145)
WBC # BLD AUTO: 5.79 K/UL (ref 3.9–12.7)

## 2020-11-24 PROCEDURE — 80053 COMPREHEN METABOLIC PANEL: CPT

## 2020-11-24 PROCEDURE — 99214 PR OFFICE/OUTPT VISIT, EST, LEVL IV, 30-39 MIN: ICD-10-PCS | Mod: S$PBB,,, | Performed by: FAMILY MEDICINE

## 2020-11-24 PROCEDURE — 99214 OFFICE O/P EST MOD 30 MIN: CPT | Mod: PBBFAC | Performed by: FAMILY MEDICINE

## 2020-11-24 PROCEDURE — 99214 OFFICE O/P EST MOD 30 MIN: CPT | Mod: S$PBB,,, | Performed by: FAMILY MEDICINE

## 2020-11-24 PROCEDURE — 36415 COLL VENOUS BLD VENIPUNCTURE: CPT

## 2020-11-24 PROCEDURE — 99999 PR PBB SHADOW E&M-EST. PATIENT-LVL IV: CPT | Mod: PBBFAC,,, | Performed by: FAMILY MEDICINE

## 2020-11-24 PROCEDURE — 85025 COMPLETE CBC W/AUTO DIFF WBC: CPT

## 2020-11-24 PROCEDURE — 86140 C-REACTIVE PROTEIN: CPT

## 2020-11-24 PROCEDURE — 85651 RBC SED RATE NONAUTOMATED: CPT

## 2020-11-24 PROCEDURE — 99999 PR PBB SHADOW E&M-EST. PATIENT-LVL IV: ICD-10-PCS | Mod: PBBFAC,,, | Performed by: FAMILY MEDICINE

## 2020-11-24 RX ORDER — PROPRANOLOL HYDROCHLORIDE 20 MG/1
20 TABLET ORAL 2 TIMES DAILY
Qty: 60 TABLET | Refills: 1 | Status: SHIPPED | OUTPATIENT
Start: 2020-11-24 | End: 2021-02-01

## 2020-11-24 RX ORDER — MUPIROCIN 20 MG/G
OINTMENT TOPICAL 2 TIMES DAILY
Qty: 30 G | Refills: 0 | Status: ON HOLD | OUTPATIENT
Start: 2020-11-24 | End: 2020-12-01

## 2020-11-24 RX ORDER — BUSPIRONE HYDROCHLORIDE 5 MG/1
5 TABLET ORAL 2 TIMES DAILY PRN
Qty: 60 TABLET | Refills: 0 | Status: SHIPPED | OUTPATIENT
Start: 2020-11-24 | End: 2021-01-06 | Stop reason: SDUPTHER

## 2020-11-24 NOTE — TELEPHONE ENCOUNTER
Has deep sores back of thighs on both legs x 1 month. Unsure if it is a reaction to Benlysta or Lupus flare    Went to  and was given a prescription for antibiotics. Has an appt with Dr MAURICIO Monday janene to be worked in tomorrow    See pics from  10/26/2020      [phone] pt  [phone] mom

## 2020-11-24 NOTE — PROGRESS NOTES
"Subjective:       Patient ID: Dilcia Reed is a 20 y.o. female.    Chief Complaint: Follow-up (anxiety and tachycardia)    HPI  Here for anxiety and tachycardia  Has not been on meds in past for either    Being tx for lupus  Skin findings 3 weeks ago  Has been using mupriocin  Went to urgent care and received topical and oral abx, with some improvement    Anemia  Has appt with hem in Dec 2  Review of Systems   Respiratory: Negative for shortness of breath.    Cardiovascular: Positive for palpitations. Negative for chest pain.   Skin: Positive for wound.   Psychiatric/Behavioral: Negative for dysphoric mood, self-injury and suicidal ideas. The patient is nervous/anxious.         Objective:   BP (!) 118/50 (BP Location: Right arm, Patient Position: Sitting, BP Method: Medium (Manual))   Pulse (!) 152   Temp 97.3 °F (36.3 °C) (Temporal)   Ht 5' 3" (1.6 m)   Wt 72 kg (158 lb 9.9 oz)   LMP 11/21/2020 (Exact Date)   SpO2 100%   BMI 28.10 kg/m²     BP Readings from Last 3 Encounters:   11/24/20 (!) 118/50   11/10/20 108/64   10/26/20 (!) 122/59       No results found for: LABA1C, HGBA1C    Physical Exam  Vitals signs reviewed.   Constitutional:       General: She is not in acute distress.     Appearance: Normal appearance. She is well-developed. She is not ill-appearing or toxic-appearing.   HENT:      Head: Normocephalic and atraumatic.      Right Ear: Hearing normal.      Left Ear: Hearing normal.   Neck:      Musculoskeletal: Normal range of motion.   Cardiovascular:      Rate and Rhythm: Tachycardia present.   Pulmonary:      Effort: Pulmonary effort is normal. No respiratory distress.   Abdominal:      Palpations: Abdomen is soft.   Musculoskeletal: Normal range of motion.   Skin:     General: Skin is warm and dry.      Findings: Wound present.      Comments: Back of b/l legs   Neurological:      Mental Status: She is alert and oriented to person, place, and time.   Psychiatric:         Mood and Affect: Mood " is anxious. Mood is not depressed.         Speech: Speech normal.         Behavior: Behavior normal.       Assessment:     1. Symptomatic anemia    2. Tachycardia    3. Hypokalemia    4. Anxiety    5. Immunosuppressed status    6. Dermatologic problem    7. Skin lesion    8. Systemic lupus erythematosus arthritis      Plan:     Problem List Items Addressed This Visit        Psychiatric    Anxiety    Current Assessment & Plan     No particular situation causes, occurs off and on  Declines counseling. Advised may need to initiate ssri if no improvement         Relevant Medications    busPIRone (BUSPAR) 5 MG Tab       Cardiac/Vascular    Tachycardia    Current Assessment & Plan     Trial of propranolol. ekg nsr. If no improvement, will refer to cards         Relevant Medications    propranoloL (INDERAL) 20 MG tablet       Renal/    RESOLVED: Hypokalemia       Immunology/Multi System    Systemic lupus erythematosus arthritis    Relevant Medications    mupirocin (BACTROBAN) 2 % ointment    Immunosuppressed status    Current Assessment & Plan     Declines flu vaccine            Oncology    Symptomatic anemia - Primary    Current Assessment & Plan     Has appt with hem/onc in Dec  May be causing tachycardia            Other Visit Diagnoses     Dermatologic problem        Relevant Medications    mupirocin (BACTROBAN) 2 % ointment    Other Relevant Orders    Ambulatory referral/consult to Wound Clinic    Skin lesion        Relevant Medications    mupirocin (BACTROBAN) 2 % ointment    Other Relevant Orders    Ambulatory referral/consult to Wound Clinic          Follow up in about 2 weeks (around 12/8/2020) for Follow Up, med recheck.

## 2020-11-24 NOTE — ASSESSMENT & PLAN NOTE
No particular situation causes, occurs off and on  Declines counseling. Advised may need to initiate ssri if no improvement

## 2020-11-24 NOTE — PATIENT INSTRUCTIONS
Treating Anxiety Disorders with Therapy    If you have an anxiety disorder, you dont have to suffer anymore. Treatment is available. Therapy (also called counseling) is often a helpful treatment for anxiety disorders. With therapy, a specially trained professional (therapist) helps you face and learn to manage your anxiety. Therapy can be short-term or long-term depending on your needs. In some cases, medicine may also be prescribed with therapy. It may take time before you notice how much therapy is helping, but stick with it. With therapy, you can feel better.  Cognitive behavioral therapy (CBT)  Cognitive behavioral therapy (CBT) teaches you to manage anxiety. It does this by helping you understand how you think and act when youre anxious. Research has shown CBT to be a very effective treatment for anxiety disorders. How CBT is run is almost like a class. It involves homework and activities to build skills that teach you to cope with anxiety step by step. It can be done in a group or one-on-one, and often takes place for a set number of sessions. CBT has two main parts:  · Cognitive therapy helps you identify the negative, irrational thoughts that occur with your anxiety. Youll learn to replace these with more positive, realistic thoughts.  · Behavioral therapy helps you change how you react to anxiety. Youll learn coping skills and methods for relaxing to help you better deal with anxiety.  Other forms of therapy  Other therapy methods may work better for you than CBT. Or, you may move from CBT to another form of therapy as your treatment needs change. This may mean meeting with a therapist by yourself or in a group. Therapy can also help you work through problems in your life, such as drug or alcohol dependence, that may be making your anxiety worse.  Getting better takes time  Therapy will help you feel better and teach you skills to help manage anxiety long term. But change doesnt happen right away. It  takes a commitment from you. And treatment only works if you learn to face the causes of your anxiety. So, you might feel worse before you feel better. This can sometimes make it hard to stick with it. But remember: Therapy is a very effective treatment. The results will be well worth it.  Helping yourself  If anxiety is wearing you down, here are some things you can do to cope:  · Check with your doctor and rule out any physical problems that may be causing the anxiety symptoms.  · If an anxiety disorder is diagnosed, seek mental healthcare. This is an illness and it can respond to treatment. Most types of anxiety disorders will respond to talk therapy and medicine.  · Educate yourself about anxiety disorders. Keep track of helpful online resources and books you can use during stressful periods.  · Try stress management techniques such as meditation.  · Consider online or in-person support groups.  · Dont fight your feelings. Anxiety feeds itself. The more you worry about it, the worse it gets. Instead, try to identify what might have triggered your anxiety. Then try to put this threat in perspective.  · Keep in mind that you cant control everything about a situation. Change what you can and let the rest take its course.  · Exercise -- its a great way to relieve tension and help your body feel relaxed.  · Examine your life for stress, and try to find ways to reduce it.  · Avoid caffeine and nicotine, which can make anxiety symptoms worse.  · Fight the temptation to turn to alcohol or unprescribed drugs for relief. They only make things worse in the long run.   Date Last Reviewed: 1/1/2017  © 8514-2083 [employer]. [address] [place] [address], [place] [address]. All rights reserved. This information is not intended as a substitute for professional medical care. Always follow your healthcare professional's instructions.        Buspirone tablets  What is this medicine?  BUSPIRONE (vinayak jacob) is used  to treat anxiety disorders.  How should I use this medicine?  Take this medicine by mouth with a glass of water. Follow the directions on the prescription label. You may take this medicine with or without food. To ensure that this medicine always works the same way for you, you should take it either always with or always without food. Take your doses at regular intervals. Do not take your medicine more often than directed. Do not stop taking except on the advice of your doctor or health care professional.  Talk to your pediatrician regarding the use of this medicine in children. Special care may be needed.  What side effects may I notice from receiving this medicine?  Side effects that you should report to your doctor or health care professional as soon as possible:  · blurred vision or other vision changes  · chest pain  · confusion  · difficulty breathing  · feelings of hostility or anger  · muscle aches and pains  · numbness or tingling in hands or feet  · ringing in the ears  · skin rash and itching  · vomiting  · weakness  Side effects that usually do not require medical attention (report to your doctor or health care professional if they continue or are bothersome):  · disturbed dreams, nightmares  · headache  · nausea  · restlessness or nervousness  · sore throat and nasal congestion  · stomach upset  What may interact with this medicine?  Do not take this medicine with any of the following medications:  · linezolid  · MAOIs like Carbex, Eldepryl, Marplan, Nardil, and Parnate  · methylene blue  · procarbazine  This medicine may also interact with the following medications:  · diazepam  · digoxin  · diltiazem  · erythromycin  · grapefruit juice  · haloperidol  · medicines for mental depression or mood problems  · medicines for seizures like carbamazepine, phenobarbital and phenytoin  · nefazodone  · other medications for anxiety  · rifampin  · ritonavir  · some antifungal medicines like itraconazole,  ketoconazole, and voriconazole  · verapamil  · warfarin  What if I miss a dose?  If you miss a dose, take it as soon as you can. If it is almost time for your next dose, take only that dose. Do not take double or extra doses.  Where should I keep my medicine?  Keep out of the reach of children.  Store at room temperature below 30 degrees C (86 degrees F). Protect from light. Keep container tightly closed. Throw away any unused medicine after the expiration date.  What should I tell my health care provider before I take this medicine?  They need to know if you have any of these conditions:  · kidney or liver disease  · an unusual or allergic reaction to buspirone, other medicines, foods, dyes, or preservatives  · pregnant or trying to get pregnant  · breast-feeding  What should I watch for while using this medicine?  Visit your doctor or health care professional for regular checks on your progress. It may take 1 to 2 weeks before your anxiety gets better.  You may get drowsy or dizzy. Do not drive, use machinery, or do anything that needs mental alertness until you know how this drug affects you. Do not stand or sit up quickly, especially if you are an older patient. This reduces the risk of dizzy or fainting spells. Alcohol can make you more drowsy and dizzy. Avoid alcoholic drinks.  NOTE:This sheet is a summary. It may not cover all possible information. If you have questions about this medicine, talk to your doctor, pharmacist, or health care provider. Copyright© 2017 [employer]        Propranolol tablets  What is this medicine?  PROPRANOLOL (proe PRAN oh lole) is a beta-blocker. Beta-blockers reduce the workload on the heart and help it to beat more regularly. This medicine is used to treat high blood pressure, to control irregular heart rhythms (arrhythmias) and to relieve chest pain caused by angina. It may also be helpful after a heart attack. This medicine is also used to prevent migraine headaches,  relieve uncontrollable shaking (tremors), and help certain problems related to the thyroid gland and adrenal gland.  How should I use this medicine?  Take this medicine by mouth with a glass of water. Follow the directions on the prescription label. Take your doses at regular intervals. Do not take your medicine more often than directed. Do not stop taking except on your the advice of your doctor or health care professional.  Talk to your pediatrician regarding the use of this medicine in children. Special care may be needed.  What side effects may I notice from receiving this medicine?  Side effects that you should report to your doctor or health care professional as soon as possible:  · allergic reactions like skin rash, itching or hives, swelling of the face, lips, or tongue  · breathing problems  · changes in blood sugar  · cold hands or feet  · difficulty sleeping, nightmares  · dry peeling skin  · hallucinations  · muscle cramps or weakness  · slow heart rate  · swelling of the legs and ankles  · vomiting  Side effects that usually do not require medical attention (report to your doctor or health care professional if they continue or are bothersome):  · change in sex drive or performance  · diarrhea  · dry sore eyes  · hair loss  · nausea  · weak or tired  What may interact with this medicine?  Do not take this medicine with any of the following medications:  · feverfew  · phenothiazines like chlorpromazine, mesoridazine, prochlorperazine, thioridazine  This medicine may also interact with the following medications:  · aluminum hydroxide gel  · antipyrine  · antiviral medicines for HIV or AIDS  · barbiturates like phenobarbital  · certain medicines for blood pressure, heart disease, irregular heart beat  · cimetidine  · ciprofloxacin  · diazepam  · fluconazole  · haloperidol  · isoniazid  · medicines for cholesterol like cholestyramine or colestipol  · medicines for mental depression  · medicines for migraine  headache like almotriptan, eletriptan, frovatriptan, naratriptan, rizatriptan, sumatriptan, zolmitriptan  · NSAIDs, medicines for pain and inflammation, like ibuprofen or naproxen  · phenytoin  · rifampin  · teniposide  · theophylline  · thyroid medicines  · tolbutamide  · warfarin  · zileuton  What if I miss a dose?  If you miss a dose, take it as soon as you can. If it is almost time for your next dose, take only that dose. Do not take double or extra doses.  Where should I keep my medicine?  Keep out of the reach of children.  Store at room temperature between 15 and 30 degrees C (59 and 86 degrees F). Protect from light. Throw away any unused medicine after the expiration date.  What should I tell my health care provider before I take this medicine?  They need to know if you have any of these conditions:  · circulation problems or blood vessel disease  · diabetes  · history of heart attack or heart disease, vasospastic angina  · kidney disease  · liver disease  · lung or breathing disease, like asthma or emphysema  · pheochromocytoma  · slow heart rate  · thyroid disease  · an unusual or allergic reaction to propranolol, other beta-blockers, medicines, foods, dyes, or preservatives  · pregnant or trying to get pregnant  · breast-feeding  What should I watch for while using this medicine?  Visit your doctor or health care professional for regular check ups. Check your blood pressure and pulse rate regularly. Ask your health care professional what your blood pressure and pulse rate should be, and when you should contact them.  You may get drowsy or dizzy. Do not drive, use machinery, or do anything that needs mental alertness until you know how this drug affects you. Do not stand or sit up quickly, especially if you are an older patient. This reduces the risk of dizzy or fainting spells. Alcohol can make you more drowsy and dizzy. Avoid alcoholic drinks.  This medicine can affect blood sugar levels. If you have  diabetes, check with your doctor or health care professional before you change your diet or the dose of your diabetic medicine.  Do not treat yourself for coughs, colds, or pain while you are taking this medicine without asking your doctor or health care professional for advice. Some ingredients may increase your blood pressure.  NOTE:This sheet is a summary. It may not cover all possible information. If you have questions about this medicine, talk to your doctor, pharmacist, or health care provider. Copyright© 2017 [employer]

## 2020-11-24 NOTE — TELEPHONE ENCOUNTER
Per Alicia patient needs to see dermatology. Appt scheduled for Dr Leo 12/1/2020 prior to appt with Dr LINDY Johnson requested to wait to see derm until 12/1/2020 to see both providers on the same day

## 2020-11-29 ENCOUNTER — HOSPITAL ENCOUNTER (OUTPATIENT)
Facility: HOSPITAL | Age: 20
Discharge: HOME | End: 2020-12-01
Attending: STUDENT IN AN ORGANIZED HEALTH CARE EDUCATION/TRAINING PROGRAM | Admitting: STUDENT IN AN ORGANIZED HEALTH CARE EDUCATION/TRAINING PROGRAM
Payer: OTHER GOVERNMENT

## 2020-11-29 DIAGNOSIS — R00.0 TACHYCARDIA: ICD-10-CM

## 2020-11-29 DIAGNOSIS — Z79.899 LONG TERM CURRENT USE OF IMMUNOSUPPRESSIVE DRUG: ICD-10-CM

## 2020-11-29 DIAGNOSIS — L97.911 ULCERS OF BOTH LOWER EXTREMITIES, LIMITED TO BREAKDOWN OF SKIN: ICD-10-CM

## 2020-11-29 DIAGNOSIS — R06.02 SOB (SHORTNESS OF BREATH): ICD-10-CM

## 2020-11-29 DIAGNOSIS — L97.921 ULCERS OF BOTH LOWER EXTREMITIES, LIMITED TO BREAKDOWN OF SKIN: ICD-10-CM

## 2020-11-29 DIAGNOSIS — M32.9 SYSTEMIC LUPUS ERYTHEMATOSUS ARTHRITIS: Primary | ICD-10-CM

## 2020-11-29 LAB
ALBUMIN SERPL BCP-MCNC: 3.2 G/DL (ref 3.5–5.2)
ALP SERPL-CCNC: 72 U/L (ref 55–135)
ALT SERPL W/O P-5'-P-CCNC: 29 U/L (ref 10–44)
ANION GAP SERPL CALC-SCNC: 11 MMOL/L (ref 8–16)
AST SERPL-CCNC: 58 U/L (ref 10–40)
B-HCG UR QL: NEGATIVE
BACTERIA #/AREA URNS HPF: ABNORMAL /HPF
BASOPHILS # BLD AUTO: 0 K/UL (ref 0–0.2)
BASOPHILS NFR BLD: 0 % (ref 0–1.9)
BILIRUB SERPL-MCNC: 0.4 MG/DL (ref 0.1–1)
BILIRUB UR QL STRIP: NEGATIVE
BUN SERPL-MCNC: 12 MG/DL (ref 6–20)
CALCIUM SERPL-MCNC: 8.1 MG/DL (ref 8.7–10.5)
CHLORIDE SERPL-SCNC: 105 MMOL/L (ref 95–110)
CLARITY UR: CLEAR
CO2 SERPL-SCNC: 23 MMOL/L (ref 23–29)
COLOR UR: YELLOW
CREAT SERPL-MCNC: 0.7 MG/DL (ref 0.5–1.4)
CRP SERPL-MCNC: 8.7 MG/L (ref 0–8.2)
DIFFERENTIAL METHOD: ABNORMAL
EOSINOPHIL # BLD AUTO: 0 K/UL (ref 0–0.5)
EOSINOPHIL NFR BLD: 0 % (ref 0–8)
ERYTHROCYTE [DISTWIDTH] IN BLOOD BY AUTOMATED COUNT: 13.3 % (ref 11.5–14.5)
ERYTHROCYTE [SEDIMENTATION RATE] IN BLOOD BY WESTERGREN METHOD: 85 MM/HR (ref 0–20)
EST. GFR  (AFRICAN AMERICAN): >60 ML/MIN/1.73 M^2
EST. GFR  (NON AFRICAN AMERICAN): >60 ML/MIN/1.73 M^2
GLUCOSE SERPL-MCNC: 101 MG/DL (ref 70–110)
GLUCOSE UR QL STRIP: NEGATIVE
HCT VFR BLD AUTO: 31 % (ref 37–48.5)
HCV AB SERPL QL IA: NEGATIVE
HGB BLD-MCNC: 9.7 G/DL (ref 12–16)
HGB UR QL STRIP: NEGATIVE
HIV 1+2 AB+HIV1 P24 AG SERPL QL IA: NEGATIVE
HYALINE CASTS #/AREA URNS LPF: 1 /LPF
IMM GRANULOCYTES # BLD AUTO: 0.06 K/UL (ref 0–0.04)
IMM GRANULOCYTES NFR BLD AUTO: 1 % (ref 0–0.5)
KETONES UR QL STRIP: ABNORMAL
LACTATE SERPL-SCNC: 1.2 MMOL/L (ref 0.5–2.2)
LACTATE SERPL-SCNC: 2.2 MMOL/L (ref 0.5–2.2)
LEUKOCYTE ESTERASE UR QL STRIP: ABNORMAL
LYMPHOCYTES # BLD AUTO: 0.3 K/UL (ref 1–4.8)
LYMPHOCYTES NFR BLD: 4.2 % (ref 18–48)
MCH RBC QN AUTO: 29.7 PG (ref 27–31)
MCHC RBC AUTO-ENTMCNC: 31.3 G/DL (ref 32–36)
MCV RBC AUTO: 95 FL (ref 82–98)
MICROSCOPIC COMMENT: ABNORMAL
MONOCYTES # BLD AUTO: 0.2 K/UL (ref 0.3–1)
MONOCYTES NFR BLD: 2.9 % (ref 4–15)
NEUTROPHILS # BLD AUTO: 5.6 K/UL (ref 1.8–7.7)
NEUTROPHILS NFR BLD: 91.9 % (ref 38–73)
NITRITE UR QL STRIP: NEGATIVE
NRBC BLD-RTO: 0 /100 WBC
PH UR STRIP: 6 [PH] (ref 5–8)
PLATELET # BLD AUTO: 239 K/UL (ref 150–350)
PMV BLD AUTO: 9.9 FL (ref 9.2–12.9)
POTASSIUM SERPL-SCNC: 3.6 MMOL/L (ref 3.5–5.1)
PROCALCITONIN SERPL IA-MCNC: 0.12 NG/ML
PROT SERPL-MCNC: 8.2 G/DL (ref 6–8.4)
PROT UR QL STRIP: ABNORMAL
RBC # BLD AUTO: 3.27 M/UL (ref 4–5.4)
RBC #/AREA URNS HPF: 2 /HPF (ref 0–4)
SARS-COV-2 RDRP RESP QL NAA+PROBE: NEGATIVE
SODIUM SERPL-SCNC: 139 MMOL/L (ref 136–145)
SP GR UR STRIP: >=1.03 (ref 1–1.03)
URN SPEC COLLECT METH UR: ABNORMAL
UROBILINOGEN UR STRIP-ACNC: NEGATIVE EU/DL
WBC # BLD AUTO: 6.12 K/UL (ref 3.9–12.7)
WBC #/AREA URNS HPF: 7 /HPF (ref 0–5)

## 2020-11-29 PROCEDURE — 81000 URINALYSIS NONAUTO W/SCOPE: CPT

## 2020-11-29 PROCEDURE — 80053 COMPREHEN METABOLIC PANEL: CPT

## 2020-11-29 PROCEDURE — 87040 BLOOD CULTURE FOR BACTERIA: CPT

## 2020-11-29 PROCEDURE — 85025 COMPLETE CBC W/AUTO DIFF WBC: CPT

## 2020-11-29 PROCEDURE — 96367 TX/PROPH/DG ADDL SEQ IV INF: CPT

## 2020-11-29 PROCEDURE — 83605 ASSAY OF LACTIC ACID: CPT | Mod: 91

## 2020-11-29 PROCEDURE — G0378 HOSPITAL OBSERVATION PER HR: HCPCS

## 2020-11-29 PROCEDURE — U0002 COVID-19 LAB TEST NON-CDC: HCPCS

## 2020-11-29 PROCEDURE — 86703 HIV-1/HIV-2 1 RESULT ANTBDY: CPT

## 2020-11-29 PROCEDURE — 96361 HYDRATE IV INFUSION ADD-ON: CPT

## 2020-11-29 PROCEDURE — 96365 THER/PROPH/DIAG IV INF INIT: CPT

## 2020-11-29 PROCEDURE — 93010 ELECTROCARDIOGRAM REPORT: CPT | Mod: ,,, | Performed by: INTERNAL MEDICINE

## 2020-11-29 PROCEDURE — 93010 ELECTROCARDIOGRAM REPORT: CPT | Mod: 76,,, | Performed by: INTERNAL MEDICINE

## 2020-11-29 PROCEDURE — 63600175 PHARM REV CODE 636 W HCPCS: Performed by: NURSE PRACTITIONER

## 2020-11-29 PROCEDURE — 81025 URINE PREGNANCY TEST: CPT

## 2020-11-29 PROCEDURE — 93010 EKG 12-LEAD: ICD-10-PCS | Mod: ,,, | Performed by: INTERNAL MEDICINE

## 2020-11-29 PROCEDURE — 86803 HEPATITIS C AB TEST: CPT

## 2020-11-29 PROCEDURE — 85651 RBC SED RATE NONAUTOMATED: CPT

## 2020-11-29 PROCEDURE — 96376 TX/PRO/DX INJ SAME DRUG ADON: CPT

## 2020-11-29 PROCEDURE — 25000003 PHARM REV CODE 250: Performed by: INTERNAL MEDICINE

## 2020-11-29 PROCEDURE — 96375 TX/PRO/DX INJ NEW DRUG ADDON: CPT

## 2020-11-29 PROCEDURE — 84145 PROCALCITONIN (PCT): CPT

## 2020-11-29 PROCEDURE — 93005 ELECTROCARDIOGRAM TRACING: CPT

## 2020-11-29 PROCEDURE — 25000003 PHARM REV CODE 250: Performed by: STUDENT IN AN ORGANIZED HEALTH CARE EDUCATION/TRAINING PROGRAM

## 2020-11-29 PROCEDURE — 86140 C-REACTIVE PROTEIN: CPT

## 2020-11-29 PROCEDURE — 63600175 PHARM REV CODE 636 W HCPCS: Performed by: INTERNAL MEDICINE

## 2020-11-29 PROCEDURE — 99285 EMERGENCY DEPT VISIT HI MDM: CPT | Mod: 25

## 2020-11-29 PROCEDURE — 63600175 PHARM REV CODE 636 W HCPCS: Performed by: STUDENT IN AN ORGANIZED HEALTH CARE EDUCATION/TRAINING PROGRAM

## 2020-11-29 RX ORDER — IBUPROFEN 200 MG
24 TABLET ORAL
Status: DISCONTINUED | OUTPATIENT
Start: 2020-11-29 | End: 2020-12-01 | Stop reason: HOSPADM

## 2020-11-29 RX ORDER — ACETAMINOPHEN 500 MG
500 TABLET ORAL
Status: COMPLETED | OUTPATIENT
Start: 2020-11-29 | End: 2020-11-29

## 2020-11-29 RX ORDER — PROPRANOLOL HYDROCHLORIDE 20 MG/1
20 TABLET ORAL 2 TIMES DAILY
Status: DISCONTINUED | OUTPATIENT
Start: 2020-11-29 | End: 2020-12-01 | Stop reason: HOSPADM

## 2020-11-29 RX ORDER — SODIUM CHLORIDE 0.9 % (FLUSH) 0.9 %
10 SYRINGE (ML) INJECTION
Status: DISCONTINUED | OUTPATIENT
Start: 2020-11-29 | End: 2020-12-01 | Stop reason: HOSPADM

## 2020-11-29 RX ORDER — MORPHINE SULFATE 4 MG/ML
2 INJECTION, SOLUTION INTRAMUSCULAR; INTRAVENOUS EVERY 4 HOURS PRN
Status: DISCONTINUED | OUTPATIENT
Start: 2020-11-29 | End: 2020-12-01 | Stop reason: HOSPADM

## 2020-11-29 RX ORDER — ONDANSETRON 2 MG/ML
4 INJECTION INTRAMUSCULAR; INTRAVENOUS
Status: COMPLETED | OUTPATIENT
Start: 2020-11-29 | End: 2020-11-29

## 2020-11-29 RX ORDER — HYDROMORPHONE HYDROCHLORIDE 2 MG/ML
1 INJECTION, SOLUTION INTRAMUSCULAR; INTRAVENOUS; SUBCUTANEOUS
Status: COMPLETED | OUTPATIENT
Start: 2020-11-29 | End: 2020-11-29

## 2020-11-29 RX ORDER — HYDROXYCHLOROQUINE SULFATE 200 MG/1
200 TABLET, FILM COATED ORAL 2 TIMES DAILY
Status: DISCONTINUED | OUTPATIENT
Start: 2020-11-29 | End: 2020-12-01 | Stop reason: HOSPADM

## 2020-11-29 RX ORDER — GLUCAGON 1 MG
1 KIT INJECTION
Status: DISCONTINUED | OUTPATIENT
Start: 2020-11-29 | End: 2020-12-01 | Stop reason: HOSPADM

## 2020-11-29 RX ORDER — ACETAMINOPHEN 500 MG
1000 TABLET ORAL
Status: COMPLETED | OUTPATIENT
Start: 2020-11-29 | End: 2020-11-29

## 2020-11-29 RX ORDER — ONDANSETRON 2 MG/ML
4 INJECTION INTRAMUSCULAR; INTRAVENOUS EVERY 6 HOURS PRN
Status: DISCONTINUED | OUTPATIENT
Start: 2020-11-29 | End: 2020-12-01 | Stop reason: HOSPADM

## 2020-11-29 RX ORDER — BUSPIRONE HYDROCHLORIDE 5 MG/1
5 TABLET ORAL 2 TIMES DAILY PRN
Status: DISCONTINUED | OUTPATIENT
Start: 2020-11-29 | End: 2020-12-01 | Stop reason: HOSPADM

## 2020-11-29 RX ORDER — CEFEPIME HYDROCHLORIDE 1 G/50ML
1 INJECTION, SOLUTION INTRAVENOUS
Status: DISCONTINUED | OUTPATIENT
Start: 2020-11-29 | End: 2020-11-30

## 2020-11-29 RX ORDER — IBUPROFEN 200 MG
16 TABLET ORAL
Status: DISCONTINUED | OUTPATIENT
Start: 2020-11-29 | End: 2020-12-01 | Stop reason: HOSPADM

## 2020-11-29 RX ORDER — PREDNISONE 20 MG/1
20 TABLET ORAL DAILY
Status: DISCONTINUED | OUTPATIENT
Start: 2020-11-30 | End: 2020-12-01 | Stop reason: HOSPADM

## 2020-11-29 RX ADMIN — HYDROMORPHONE HYDROCHLORIDE 1 MG: 2 INJECTION INTRAMUSCULAR; INTRAVENOUS; SUBCUTANEOUS at 09:11

## 2020-11-29 RX ADMIN — CEFEPIME HYDROCHLORIDE 1 G: 1 INJECTION, SOLUTION INTRAVENOUS at 10:11

## 2020-11-29 RX ADMIN — PROPRANOLOL HYDROCHLORIDE 20 MG: 20 TABLET ORAL at 10:11

## 2020-11-29 RX ADMIN — VANCOMYCIN HYDROCHLORIDE 2250 MG: 100 INJECTION, POWDER, LYOPHILIZED, FOR SOLUTION INTRAVENOUS at 08:11

## 2020-11-29 RX ADMIN — CEFTRIAXONE 2 G: 2 INJECTION, SOLUTION INTRAVENOUS at 08:11

## 2020-11-29 RX ADMIN — HYDROXYCHLOROQUINE SULFATE 200 MG: 200 TABLET, FILM COATED ORAL at 10:11

## 2020-11-29 RX ADMIN — SODIUM CHLORIDE, SODIUM LACTATE, POTASSIUM CHLORIDE, AND CALCIUM CHLORIDE 2160 ML: .6; .31; .03; .02 INJECTION, SOLUTION INTRAVENOUS at 07:11

## 2020-11-29 RX ADMIN — ONDANSETRON 4 MG: 2 INJECTION INTRAMUSCULAR; INTRAVENOUS at 07:11

## 2020-11-29 RX ADMIN — ACETAMINOPHEN 500 MG: 500 TABLET ORAL at 07:11

## 2020-11-29 RX ADMIN — HYDROMORPHONE HYDROCHLORIDE 1 MG: 2 INJECTION INTRAMUSCULAR; INTRAVENOUS; SUBCUTANEOUS at 07:11

## 2020-11-30 ENCOUNTER — PATIENT OUTREACH (OUTPATIENT)
Dept: ADMINISTRATIVE | Facility: OTHER | Age: 20
End: 2020-11-30

## 2020-11-30 ENCOUNTER — PATIENT MESSAGE (OUTPATIENT)
Dept: RHEUMATOLOGY | Facility: CLINIC | Age: 20
End: 2020-11-30

## 2020-11-30 PROBLEM — L97.911 ULCERS OF BOTH LOWER EXTREMITIES, LIMITED TO BREAKDOWN OF SKIN: Status: ACTIVE | Noted: 2020-11-30

## 2020-11-30 PROBLEM — L97.921 ULCERS OF BOTH LOWER EXTREMITIES, LIMITED TO BREAKDOWN OF SKIN: Status: ACTIVE | Noted: 2020-11-30

## 2020-11-30 PROCEDURE — 63600175 PHARM REV CODE 636 W HCPCS: Performed by: INTERNAL MEDICINE

## 2020-11-30 PROCEDURE — 96376 TX/PRO/DX INJ SAME DRUG ADON: CPT

## 2020-11-30 PROCEDURE — 25000003 PHARM REV CODE 250: Performed by: NURSE PRACTITIONER

## 2020-11-30 PROCEDURE — 96375 TX/PRO/DX INJ NEW DRUG ADDON: CPT

## 2020-11-30 PROCEDURE — 63600175 PHARM REV CODE 636 W HCPCS: Performed by: STUDENT IN AN ORGANIZED HEALTH CARE EDUCATION/TRAINING PROGRAM

## 2020-11-30 PROCEDURE — 87077 CULTURE AEROBIC IDENTIFY: CPT

## 2020-11-30 PROCEDURE — G0378 HOSPITAL OBSERVATION PER HR: HCPCS

## 2020-11-30 PROCEDURE — 63600175 PHARM REV CODE 636 W HCPCS: Performed by: NURSE PRACTITIONER

## 2020-11-30 PROCEDURE — 87070 CULTURE OTHR SPECIMN AEROBIC: CPT

## 2020-11-30 PROCEDURE — 87186 SC STD MICRODIL/AGAR DIL: CPT

## 2020-11-30 PROCEDURE — A4216 STERILE WATER/SALINE, 10 ML: HCPCS | Performed by: INTERNAL MEDICINE

## 2020-11-30 PROCEDURE — 87075 CULTR BACTERIA EXCEPT BLOOD: CPT

## 2020-11-30 PROCEDURE — 25000003 PHARM REV CODE 250: Performed by: INTERNAL MEDICINE

## 2020-11-30 RX ORDER — CEFEPIME HYDROCHLORIDE 1 G/50ML
1 INJECTION, SOLUTION INTRAVENOUS
Status: DISCONTINUED | OUTPATIENT
Start: 2020-11-30 | End: 2020-12-01 | Stop reason: HOSPADM

## 2020-11-30 RX ORDER — HYDROMORPHONE HYDROCHLORIDE 1 MG/ML
0.5 INJECTION, SOLUTION INTRAMUSCULAR; INTRAVENOUS; SUBCUTANEOUS EVERY 6 HOURS PRN
Status: DISCONTINUED | OUTPATIENT
Start: 2020-11-30 | End: 2020-12-01 | Stop reason: HOSPADM

## 2020-11-30 RX ORDER — ACETAMINOPHEN 325 MG/1
650 TABLET ORAL EVERY 6 HOURS PRN
Status: DISCONTINUED | OUTPATIENT
Start: 2020-11-30 | End: 2020-12-01 | Stop reason: HOSPADM

## 2020-11-30 RX ADMIN — ACETAMINOPHEN 650 MG: 325 TABLET ORAL at 02:11

## 2020-11-30 RX ADMIN — PROPRANOLOL HYDROCHLORIDE 20 MG: 20 TABLET ORAL at 09:11

## 2020-11-30 RX ADMIN — HYDROMORPHONE HYDROCHLORIDE 0.5 MG: 1 INJECTION, SOLUTION INTRAMUSCULAR; INTRAVENOUS; SUBCUTANEOUS at 02:11

## 2020-11-30 RX ADMIN — Medication 10 ML: at 09:11

## 2020-11-30 RX ADMIN — VANCOMYCIN HYDROCHLORIDE 1250 MG: 1.25 INJECTION, POWDER, LYOPHILIZED, FOR SOLUTION INTRAVENOUS at 10:11

## 2020-11-30 RX ADMIN — PREDNISONE 20 MG: 20 TABLET ORAL at 09:11

## 2020-11-30 RX ADMIN — CEFEPIME HYDROCHLORIDE 1 G: 1 INJECTION, SOLUTION INTRAVENOUS at 09:11

## 2020-11-30 RX ADMIN — PROPRANOLOL HYDROCHLORIDE 20 MG: 20 TABLET ORAL at 08:11

## 2020-11-30 RX ADMIN — VANCOMYCIN HYDROCHLORIDE 1250 MG: 1.25 INJECTION, POWDER, LYOPHILIZED, FOR SOLUTION INTRAVENOUS at 08:11

## 2020-11-30 RX ADMIN — HYDROXYCHLOROQUINE SULFATE 200 MG: 200 TABLET, FILM COATED ORAL at 09:11

## 2020-11-30 RX ADMIN — MORPHINE SULFATE 2 MG: 4 INJECTION, SOLUTION INTRAMUSCULAR; INTRAVENOUS at 12:11

## 2020-11-30 RX ADMIN — CEFEPIME HYDROCHLORIDE 1 G: 1 INJECTION, SOLUTION INTRAVENOUS at 11:11

## 2020-11-30 RX ADMIN — BUSPIRONE HYDROCHLORIDE 5 MG: 5 TABLET ORAL at 01:11

## 2020-11-30 RX ADMIN — MORPHINE SULFATE 2 MG: 4 INJECTION, SOLUTION INTRAMUSCULAR; INTRAVENOUS at 11:11

## 2020-11-30 RX ADMIN — HYDROXYCHLOROQUINE SULFATE 200 MG: 200 TABLET, FILM COATED ORAL at 08:11

## 2020-11-30 RX ADMIN — CEFEPIME HYDROCHLORIDE 1 G: 1 INJECTION, SOLUTION INTRAVENOUS at 03:11

## 2020-11-30 NOTE — PLAN OF CARE
11/30/20 1559   Discharge Assessment   Assessment Type Discharge Planning Assessment   Confirmed/corrected address and phone number on facesheet? Yes   Assessment information obtained from? Patient;Medical Record   Prior to hospitilization cognitive status: Alert/Oriented   Prior to hospitalization functional status: Independent   Current cognitive status: Alert/Oriented   Current Functional Status: Independent   Lives With parent(s)   Able to Return to Prior Arrangements yes   Is patient able to care for self after discharge? Yes   Who are your caregiver(s) and their phone number(s)? Tabatha Reed (mother) [phone]   Patient's perception of discharge disposition home or selfcare   Readmission Within the Last 30 Days no previous admission in last 30 days   Patient currently being followed by outpatient case management? No   Patient currently receives any other outside agency services? No   Equipment Currently Used at Home none   Do you have any problems affording any of your prescribed medications? No   Is the patient taking medications as prescribed? yes   Does the patient have transportation home? Yes   Transportation Anticipated family or friend will provide   Does the patient receive services at the [facility]? No   Discharge Plan A Home with family   Discharge Plan B Home with family   Patient/Family in Agreement with Plan yes     Met with pt at bedside for DC assessment. Pt lives at home with her mother and does not use any DME. No CM DC needs identified at this time. SWer provided a transitional care folder, information on advanced directives, information on pharmacy bedside delivery, and discharge planning begins on admission with contact information for any needs/questions. Pt uses the [facility] in [place]. Information below.      [facility] [address] [place] [address] [address] [place] [id number]  Phone: [phone] Fax: [phone]    PCP: Godfrey ELIZONDO  MD Jorden Rosa, [facility] 11/30/2020 4:02 PM

## 2020-11-30 NOTE — HOSPITAL COURSE
Patient was kept on OBS for ulcers of both lower extremities under the care of Hospital Medicine. Wound care was consulted and pt was started on IV abx. 11/30: Patient reports feeling much better today. Elevated temperature overnight. She has appt with derm and rheumatology tomorrow and needs punch biopsy for dx - will continue IV abx and dc in am if afebrile overnight so dermatology can see for biopsy. Pt and mother verbalized understanding of all. 12/1: Patient afebrile X 24 hours. Discussed with Dr. Quinn (ID) who recommends PO augmentin 875 BID X 10 days. Pt will take this and will f/u OP today with dermatology and rheumatology. Discussed all with patient, who verbalized understanding. Patient was seen and examined and deemed stable for discharge home.

## 2020-11-30 NOTE — H&P
"[facility]  History & Physical    Patient Name: Dilcia Reed  MRN: [id number]  Admission Date: 11/29/2020  Attending Physician: Alexis Quinn MD   Primary Care Provider: Godfrey Rosa MD         Patient information was obtained from patient, past medical records and ER records.     Subjective:     Principal Problem:Ulcers of both lower extremities, limited to breakdown of skin    Chief Complaint:   Chief Complaint   Patient presents with    Lupus     Pt c/o of lupus flare-ups with sores on arms and SOB and "wounds on the back of both thighs"         HPI:    20 year old woman with history of  anxiety, Lupus- on Plaquenil, prednisone and benlysta who presents to the Emergency Department  For worsening lower extremity painful ulcers that has worsened over the last 2 months . Pt reports sores on arms and wounds on the back of both thighs. There is associated history of fever . In the Ed,she is in painful distress . She has been seen by rheumatology for the skin lesion and was referred to Dermatology but has not yet seen dermatology .    ED vitals-   Initial Vitals [11/29/20 1820]   BP Pulse Resp Temp SpO2   139/85 (!) 144 20 (!) 100.7 °F (38.2 °C) 97      She will be placed on observation .    Past Medical History:   Diagnosis Date    Lupus        History reviewed. No pertinent surgical history.    Review of patient's allergies indicates:   Allergen Reactions    Sulfamethoxazole-trimethoprim Itching     syncopal episode as a child       No current facility-administered medications on file prior to encounter.      Current Outpatient Medications on File Prior to Encounter   Medication Sig    belimumab (BENLYSTA) 200 mg/mL AtIn Inject 1 mL (200 mg total) into the skin every 7 days.    busPIRone (BUSPAR) 5 MG Tab Take 1 tablet (5 mg total) by mouth 2 (two) times daily as needed (anxiety).    hydroxychloroquine (PLAQUENIL) 200 mg tablet Take 1 tablet (200 mg total) by mouth 2 (two) " times daily.    predniSONE (DELTASONE) 20 MG tablet Take 1 tablet (20 mg total) by mouth once daily.    propranoloL (INDERAL) 20 MG tablet Take 1 tablet (20 mg total) by mouth 2 (two) times daily.    betamethasone dipropionate (DIPROLENE) 0.05 % cream Apply topically 2 (two) times daily as needed. For lupus    clobetasoL (CLOBEX) 0.05 % shampoo     ferrous sulfate (IRON ORAL) Take by mouth.    fluocinolone and shower cap (DERMA-SMOOTHE/FS SCALP OIL) 0.01 % Oil Apply 1 application topically every evening.    hydrocortisone 2.5 % cream Apply topically 2 (two) times daily as needed. For lupus affecting face.    ketoconazole (NIZORAL) 2 % shampoo     methylPREDNISolone (MEDROL DOSEPACK) 4 mg tablet use as directed    mupirocin (BACTROBAN) 2 % ointment Apply topically 2 (two) times daily.    [DISCONTINUED] azaTHIOprine (IMURAN) 50 mg Tab Take 1 tablet (50 mg total) by mouth once daily.     Family History     Problem Relation (Age of Onset)    Lupus Sister        Tobacco Use    Smoking status: Never Smoker    Smokeless tobacco: Never Used   Substance and Sexual Activity    Alcohol use: Never     Frequency: Never    Drug use: Never    Sexual activity: Not on file     Review of Systems   Constitutional: Positive for activity change and appetite change. Negative for chills and fatigue.   HENT: Negative for congestion, ear pain, facial swelling, sinus pressure and sore throat.    Eyes: Negative for pain.   Respiratory: Negative for apnea, chest tightness, shortness of breath and stridor.    Cardiovascular: Negative for chest pain, palpitations and leg swelling.   Gastrointestinal: Negative for abdominal distention, abdominal pain, diarrhea and nausea.   Endocrine: Negative for polydipsia and polyphagia.   Genitourinary: Negative for decreased urine volume, difficulty urinating, frequency and genital sores.   Musculoskeletal: Positive for arthralgias. Negative for gait problem.        Skin ulcers    Neurological: Positive for dizziness. Negative for light-headedness and headaches.   Hematological: Negative for adenopathy.   Psychiatric/Behavioral: Negative for agitation, confusion and decreased concentration.     Objective:     Vital Signs (Most Recent):  Temp: 97.6 °F (36.4 °C) (11/30/20 0419)  Pulse: 86 (11/30/20 0419)  Resp: 18 (11/30/20 0419)  BP: 103/64 (11/30/20 0419)  SpO2: (!) 94 % (11/30/20 0419) Vital Signs (24h Range):  Temp:  [97.6 °F (36.4 °C)-101.1 °F (38.4 °C)] 97.6 °F (36.4 °C)  Pulse:  [] 86  Resp:  [16-24] 18  SpO2:  [94 %-100 %] 94 %  BP: (103-156)/(60-85) 103/64     Weight: 77 kg (169 lb 12.1 oz)  Body mass index is 30.07 kg/m².    Physical Exam  Vitals signs and nursing note reviewed.   Constitutional:       Appearance: She is well-developed.   HENT:      Head: Normocephalic and atraumatic.   Eyes:      Pupils: Pupils are equal, round, and reactive to light.   Neck:      Musculoskeletal: Normal range of motion and neck supple.      Thyroid: No thyromegaly.      Trachea: No tracheal deviation.   Cardiovascular:      Rate and Rhythm: Normal rate and regular rhythm.   Pulmonary:      Effort: No respiratory distress.      Breath sounds: No wheezing or rales.   Abdominal:      General: Bowel sounds are normal. There is no distension.      Palpations: Abdomen is soft.      Tenderness: There is no abdominal tenderness.   Skin:     General: Skin is warm and dry.      Coloration: Skin is not pale.      Comments: Please see pics   Neurological:      Mental Status: She is alert and oriented to person, place, and time.      Cranial Nerves: No cranial nerve deficit.      Coordination: Coordination normal.      Deep Tendon Reflexes: Reflexes are normal and symmetric.   Psychiatric:         Thought Content: Thought content normal.         Judgment: Judgment normal.           CRANIAL NERVES     CN III, IV, VI   Pupils are equal, round, and reactive to light.                   Significant Labs:    Blood Culture: No results for input(s): LABBLOO in the last 48 hours.  BMP:   Recent Labs   Lab 11/29/20 1912         K 3.6      CO2 23   BUN 12   CREATININE 0.7   CALCIUM 8.1*     CBC:   Recent Labs   Lab 11/29/20 1912   WBC 6.12   HGB 9.7*   HCT 31.0*        CMP:   Recent Labs   Lab 11/29/20 1912      K 3.6      CO2 23      BUN 12   CREATININE 0.7   CALCIUM 8.1*   PROT 8.2   ALBUMIN 3.2*   BILITOT 0.4   ALKPHOS 72   AST 58*   ALT 29   ANIONGAP 11   EGFRNONAA >60     All pertinent labs within the past 24 hours have been reviewed.    Significant Imaging: I have reviewed and interpreted all pertinent imaging results/findings within the past 24 hours.    Assessment/Plan:     * Ulcers of both lower extremities, limited to breakdown of skin    With history of SLE, pyoderma gangrenosum is of concern .  Will need biopsy but there is concern for pathergy .  Dermatology evaluation as outpatient .  Continue wound care /wound culture   Generous analgesia   Continue Cefepime/vanco  She is an immunocompromised host     Anxiety    On buspirone    Tachycardia    On metoprolol, will monitor HR .  Could be related to pain .Will need further evaluation for other causes including PE if this persists .  She was tearful and in painful distress in the Ed      Long term current use of immunosuppressive drug    She needs close monitoring . Follow rheumatology     Discoid lupus erythematosus    on Plaquenil, prednisone and benlysta  Follow rheumatology     Other autoimmune hemolytic anemias    Will monitor closely , transfuse as needed    Systemic lupus erythematosus arthritis  Will follow out patient rheumatology ,       VTE Risk Mitigation (From admission, onward)         Ordered     IP VTE HIGH RISK PATIENT  Once      11/29/20 2150     Place sequential compression device  Until discontinued      11/29/20 2150                   Alexis Quinn MD  Department of Hospital Medicine   [facility]

## 2020-11-30 NOTE — ASSESSMENT & PLAN NOTE
With history of SLE, pyoderma gangrenosum is of concern .  Will need biopsy but there is concern for pathergy .  Dermatology evaluation as outpatient .  Continue wound care /wound culture   Generous analgesia   Continue Cefepime/vanco  She is an immunocompromised host   11/30:  --has OP derm appt tomorrow  --wound care following  --continue IV cepemime/vanc

## 2020-11-30 NOTE — ED PROVIDER NOTES
"11/29/2020,6:18 PM  History was obtained from the patient.           History of Present Illness: Dilcia Reed 20 y.o. female patient PMHX lupus who presents to Emergency Department for "lupus flare". She reports fever, nausea, SOB, bilateral wounds to posterior thighs that are draining onset two weeks ago. She reports symptoms worsened today. Pt denies any recent Covid exposure.      6:18 PM: ILinda NP have performed the initial provider exam in triage, which included initial history, focused physical exam, and initial orders. The patinet was informed that there is currently a longer than normal wait time and verbalized agreement with the initial treatment plan. The patient verbalized understanding that they will be seen and dispositioned by the next available physician. I am removing myself from the care of pt and pt will now be assigned to the next available physician.      SCRIBE #1 NOTE: I, Sharad Cota, am scribing for, and in the presence of, Trae Torres MD. I have scribed the entire note.      History      Chief Complaint   Patient presents with    Lupus     Pt c/o of lupus flare-ups with sores on arms and SOB and "wounds on the back of both thighs"        Review of patient's allergies indicates:   Allergen Reactions    Sulfamethoxazole-trimethoprim Itching     syncopal episode as a child        HPI   HPI    11/29/2020, 7:14 PM   History obtained from the patient      History of Present Illness: Dilcia Reed is a 20 y.o. female patient with a PMHx of Lupus who presents to the Emergency Department for evaluation of Lupus flare up which onset gradually 2 months ago. Pt states her sores have gotten progressively worse. Pt reports sores on arms and wounds on the back of both thighs. Pt reports no wound care, besides an appointment at clinic 1 month ago, but was referred to a dermatologist. Symptoms are constant and moderate in severity. No mitigating or exacerbating factors reported. " Associated sxs include SOB and skin changes (lower legs and arms). Patient denies any fever, chills, N/V/D, extremity weakness/numbness, HA, dizziness, and all other sxs at this time. No prior Tx included. Pt states she takes Prednisone, plaquenil, anxiety meds, and heart rate meds. Pt states her Rheumatologist is Dr. Barker. No further complaints or concerns at this time.         Arrival mode: Personal vehicle     PCP: Godfrey Rosa MD       Past Medical History:  Past Medical History:   Diagnosis Date    Lupus        Past Surgical History:  History reviewed. No pertinent surgical history.      Family History:  Family History   Problem Relation Age of Onset    Lupus Sister        Social History:  Social History     Tobacco Use    Smoking status: Never Smoker    Smokeless tobacco: Never Used   Substance and Sexual Activity    Alcohol use: Never     Frequency: Never    Drug use: Never    Sexual activity: Unknown       ROS   Review of Systems   Constitutional: Negative for chills and fever.   HENT: Negative for sore throat.    Respiratory: Positive for shortness of breath.    Cardiovascular: Negative for chest pain.   Gastrointestinal: Negative for diarrhea, nausea and vomiting.   Genitourinary: Negative for dysuria.   Musculoskeletal: Negative for back pain.   Skin: Negative for rash.        (+) skin changes (lower legs and arms)  (+) sores on arms and wound on the back of both thighs   Neurological: Negative for dizziness, weakness, numbness and headaches.   Hematological: Does not bruise/bleed easily.   All other systems reviewed and are negative.    Physical Exam      Initial Vitals [11/29/20 1820]   BP Pulse Resp Temp SpO2   139/85 (!) 144 20 (!) 100.7 °F (38.2 °C) 97 %      MAP       --          Physical Exam  Nursing Notes and Vital Signs Reviewed.  Constitutional: Patient is in no acute distress. Well-developed and well-nourished.  Head: Atraumatic. Normocephalic.  Eyes: PERRL. EOM intact.  "Conjunctivae are not pale. No scleral icterus.  ENT: Mucous membranes are moist. Oropharynx is clear and symmetric.    Neck: Supple. Full ROM. No lymphadenopathy.  Cardiovascular: Regular rate. Regular rhythm. No murmurs, rubs, or gallops. Distal pulses are 2+ and symmetric.  Pulmonary/Chest: No respiratory distress. Clear to auscultation bilaterally. No wheezing or rales.  Abdominal: Soft and non-distended.  There is no tenderness.  No rebound, guarding, or rigidity. Good bowel sounds.  Genitourinary: No CVA tenderness  Musculoskeletal: Moves all extremities. No obvious deformities. No edema. No calf tenderness.  Skin: Warm and dry.  Left posterior thigh: roughly circular 4 cm across. Purulent. Deep and ulcerative wound  Right posterior thigh: two spots, 2 cm and 1 cm across. No purulence. Deep and ulcerative wound.          Neurological:  Alert, awake, and appropriate.  Normal speech.  No acute focal neurological deficits are appreciated.  Psychiatric: Normal affect. Good eye contact. Appropriate in content.    ED Course    Procedures  ED Vital Signs:  Vitals:    11/29/20 1820 11/29/20 1829 11/29/20 1920   BP: 139/85     Pulse: (!) 144     Resp: 20     Temp: (!) 100.7 °F (38.2 °C)     TempSrc: Oral     SpO2: 97%     Weight:  72 kg (158 lb 11.7 oz) 88.2 kg (194 lb 6.4 oz)   Height: 5' 3" (1.6 m)         Abnormal Lab Results:  Labs Reviewed   CULTURE, BLOOD   CULTURE, BLOOD   HIV 1 / 2 ANTIBODY   HEPATITIS C ANTIBODY   CBC W/ AUTO DIFFERENTIAL   COMPREHENSIVE METABOLIC PANEL   LACTIC ACID, PLASMA   URINALYSIS, REFLEX TO URINE CULTURE   PREGNANCY TEST, URINE RAPID   SEDIMENTATION RATE   C-REACTIVE PROTEIN   PROCALCITONIN        All Lab Results:  Results for orders placed or performed during the hospital encounter of 11/29/20   HIV 1/2 Ag/Ab (4th Gen)   Result Value Ref Range    HIV 1/2 Ag/Ab Negative Negative   Hepatitis C antibody   Result Value Ref Range    Hepatitis C Ab Negative Negative   CBC auto differential "   Result Value Ref Range    WBC 6.12 3.90 - 12.70 K/uL    RBC 3.27 (L) 4.00 - 5.40 M/uL    Hemoglobin 9.7 (L) 12.0 - 16.0 g/dL    Hematocrit 31.0 (L) 37.0 - 48.5 %    MCV 95 82 - 98 fL    MCH 29.7 27.0 - 31.0 pg    MCHC 31.3 (L) 32.0 - 36.0 g/dL    RDW 13.3 11.5 - 14.5 %    Platelets 239 150 - 350 K/uL    MPV 9.9 9.2 - 12.9 fL    Immature Granulocytes 1.0 (H) 0.0 - 0.5 %    Gran # (ANC) 5.6 1.8 - 7.7 K/uL    Immature Grans (Abs) 0.06 (H) 0.00 - 0.04 K/uL    Lymph # 0.3 (L) 1.0 - 4.8 K/uL    Mono # 0.2 (L) 0.3 - 1.0 K/uL    Eos # 0.0 0.0 - 0.5 K/uL    Baso # 0.00 0.00 - 0.20 K/uL    nRBC 0 0 /100 WBC    Gran % 91.9 (H) 38.0 - 73.0 %    Lymph % 4.2 (L) 18.0 - 48.0 %    Mono % 2.9 (L) 4.0 - 15.0 %    Eosinophil % 0.0 0.0 - 8.0 %    Basophil % 0.0 0.0 - 1.9 %    Differential Method Automated    Comprehensive metabolic panel   Result Value Ref Range    Sodium 139 136 - 145 mmol/L    Potassium 3.6 3.5 - 5.1 mmol/L    Chloride 105 95 - 110 mmol/L    CO2 23 23 - 29 mmol/L    Glucose 101 70 - 110 mg/dL    BUN 12 6 - 20 mg/dL    Creatinine 0.7 0.5 - 1.4 mg/dL    Calcium 8.1 (L) 8.7 - 10.5 mg/dL    Total Protein 8.2 6.0 - 8.4 g/dL    Albumin 3.2 (L) 3.5 - 5.2 g/dL    Total Bilirubin 0.4 0.1 - 1.0 mg/dL    Alkaline Phosphatase 72 55 - 135 U/L    AST 58 (H) 10 - 40 U/L    ALT 29 10 - 44 U/L    Anion Gap 11 8 - 16 mmol/L    eGFR if African American >60 >60 mL/min/1.73 m^2    eGFR if non African American >60 >60 mL/min/1.73 m^2   Lactic acid, plasma #1   Result Value Ref Range    Lactate (Lactic Acid) 2.2 0.5 - 2.2 mmol/L   Sedimentation rate   Result Value Ref Range    Sed Rate 85 (H) 0 - 20 mm/Hr   C-reactive protein   Result Value Ref Range    CRP 8.7 (H) 0.0 - 8.2 mg/L   Procalcitonin   Result Value Ref Range    Procalcitonin 0.12 <0.25 ng/mL   COVID-19 Rapid Screening   Result Value Ref Range    SARS-CoV-2 RNA, Amplification, Qual Negative Negative         Imaging Results:  Imaging Results          X-Ray Chest AP Portable  (Final result)  Result time 11/29/20 19:19:19    Final result by Pricilla Valiente MD (11/29/20 19:19:19)                 Impression:      No acute abnormality.      Electronically signed by: Rocco Pleitez  Date:    11/29/2020  Time:    19:19             Narrative:    EXAMINATION:  XR CHEST AP PORTABLE    CLINICAL HISTORY:  Sepsis;    TECHNIQUE:  Single frontal view of the chest was performed.    COMPARISON:  None    FINDINGS:  The lungs are clear, with normal appearance of pulmonary vasculature and no pleural effusion or pneumothorax.    The cardiac silhouette is normal in size. The hilar and mediastinal contours are unremarkable.    Bones are intact.                               The EKG was ordered, reviewed, and independently interpreted by the ED provider.  Interpretation time: 1903  Rate: 137 BPM  Rhythm: Sinus Tachycardia  Interpretation: Otherwise normal ECG. No acute ST changes. No STEMI.           The Emergency Provider reviewed the vital signs and test results, which are outlined above.    ED Discussion     9:18 PM: Discussed case with Rica Herrera NP ([facility]). Dr. Quinn agrees with current care and management of pt and accepts admission.   Admitting Service: [facility]  Admitting Physician: Dr. Quinn  Admit to: OBS (med-surg)    9:21 PM: Re-evaluated pt. I have discussed test results, shared treatment plan, and the need for admission with patient and family at bedside. Pt and family express understanding at this time and agree with all information. All questions answered. Pt and family have no further questions or concerns at this time. Pt is ready for admit.           ED Medication(s):  Medications   lactated ringers bolus 2,160 mL (2,160 mLs Intravenous New Bag 11/29/20 1920)   acetaminophen tablet 1,000 mg (1,000 mg Oral Given 11/29/20 1930)             Medical Decision Making    Medical Decision Making:   Independently Interpreted Test(s):   I have ordered and independently  interpreted X-rays - see summary below.       <> Summary of X-Ray Reading(s): CXR without acute findings  I have ordered and independently interpreted EKG Reading(s) - see prior notes  Clinical Tests:   Lab Tests: Ordered and Reviewed  Radiological Study: Ordered and Reviewed  Medical Tests: Ordered and Reviewed  ED Management:  20-year-old female presents for evaluation of bilateral lower extremity wounds.  Patient is on Plaquenil and chronic steroids at home for lupus.  Patient has not had any wound care or management outside of home bandages since October.  Wound has gotten much worse since that time.  Patient came in today febrile and tachycardic.  She responded to acetaminophen and heart rate has come down some but is still tachycardic into the 120s.  My concern is that the patient is immunosuppressed and is going to require continued IV antibiotics for these wounds.           Scribe Attestation:   Scribe #1: I performed the above scribed service and the documentation accurately describes the services I performed. I attest to the accuracy of the note.    Attending:   Physician Attestation Statement for Scribe #1: I, Trae Torres MD, personally performed the services described in this documentation, as scribed by Sharad Cota, in my presence, and it is both accurate and complete.          Clinical Impression       ICD-10-CM ICD-9-CM   1. Systemic lupus erythematosus arthritis  M32.9 710.0   2. SOB (shortness of breath)  R06.02 786.05   3. Tachycardia  R00.0 785.0   4. Long term current use of immunosuppressive drug  Z79.899 V58.69   5. Ulcers of both lower extremities, limited to breakdown of skin  L97.911 707.10    L97.921        Disposition:   Disposition: Placed in Observation (Med-Surg)  Condition: Fair           Trae Torres MD  12/01/20 1910

## 2020-11-30 NOTE — PLAN OF CARE
Reviewed POC with patient and sister. VSS. No ss of distress or adverse reactions to meds. No c/o restlessness, anxiousness, or SOB at this time. Wounds to posterior thigh covered with aquacel foam dressing per protocol. Cardiac monitor. Room Air.Will reassess hourly and as needed.

## 2020-11-30 NOTE — HPI
20 year old woman with history of  anxiety, Lupus- on Plaquenil, prednisone and benlysta who presents to the Emergency Department  For worsening lower extremity painful ulcers that has worsened over the last 2 months . Pt reports sores on arms and wounds on the back of both thighs. There is associated history of fever . In the Ed,she is in painful distress . She has been seen by rheumatology for the skin lesion and was referred to Dermatology but has not yet seen dermatology .    ED vitals-   Initial Vitals [11/29/20 1820]   BP Pulse Resp Temp SpO2   139/85 (!) 144 20 (!) 100.7 °F (38.2 °C) 97      She will be placed on observation .

## 2020-11-30 NOTE — PLAN OF CARE
POC reviewed with Patient, verbalizes understanding  Ambulated to bathroom with standby assist.   Able to turn/ reposition self- waffle mattress placed for comfort  Acute pain with moving, denied pain medication this shift  Wounds covered on bilateral posterior thighs.   Rounding completed

## 2020-11-30 NOTE — ED NOTES
Pt. Resting in bed. No acute distress, RR equal and non labored, VSS. Bed in low and locked position with call light in reach. Side rails up X 2. Patient on continuous pulse ox, automatic blood pressure cuff and cardiac monitor. Patient denies any needs at this time. Will continue to monitor.

## 2020-11-30 NOTE — PROGRESS NOTES
Pharmacokinetic Initial Assessment: IV Vancomycin    Assessment/Plan:    Initiate intravenous vancomycin with loading dose of 2250 mg once followed by a maintenance dose of vancomycin 1250 mg IV every 12 hours  Desired empiric serum trough concentration is 10 to 15 mcg/mL  Draw vancomycin trough level 60 min prior to fourth dose on 12/1/20 at approximately 0800  Pharmacy will continue to follow and monitor vancomycin.      Please contact pharmacy at extension [phone] with any questions regarding this assessment.     Thank you for the consult,   Michael Interiano       Patient brief summary:  Dilcia Reed is a 20 y.o. female initiated on antimicrobial therapy with IV Vancomycin for treatment of suspected skin & soft tissue infection    Drug Allergies:   Review of patient's allergies indicates:   Allergen Reactions    Sulfamethoxazole-trimethoprim Itching     syncopal episode as a child       Actual Body Weight:   77.7 kg    Renal Function:   Estimated Creatinine Clearance: 126.5 mL/min (based on SCr of 0.7 mg/dL).,     Dialysis Method (if applicable):  N/A    CBC (last 72 hours):  Recent Labs   Lab Result Units 11/29/20 1912   WBC K/uL 6.12   Hemoglobin g/dL 9.7*   Hematocrit % 31.0*   Platelets K/uL 239   Gran % % 91.9*   Lymph % % 4.2*   Mono % % 2.9*   Eosinophil % % 0.0   Basophil % % 0.0   Differential Method  Automated       Metabolic Panel (last 72 hours):  Recent Labs   Lab Result Units 11/29/20 1912 11/29/20  2131   Sodium mmol/L 139  --    Potassium mmol/L 3.6  --    Chloride mmol/L 105  --    CO2 mmol/L 23  --    Glucose mg/dL 101  --    Glucose, UA   --  Negative   BUN mg/dL 12  --    Creatinine mg/dL 0.7  --    Albumin g/dL 3.2*  --    Total Bilirubin mg/dL 0.4  --    Alkaline Phosphatase U/L 72  --    AST U/L 58*  --    ALT U/L 29  --        Drug levels (last 3 results):  No results for input(s): VANCOMYCINRA, VANCOMYCINPE, VANCOMYCINTR in the last 72 hours.    Microbiologic Results:  Microbiology Results  (last 7 days)       Procedure Component Value Units Date/Time    Blood culture x two cultures. Draw prior to antibiotics. [[id number]] Collected: 11/29/20 1912    Order Status: Sent Specimen: Blood from Peripheral, Antecubital, Right Updated: 11/29/20 1950    Blood culture x two cultures. Draw prior to antibiotics. [[id number]] Collected: 11/29/20 1917    Order Status: Sent Specimen: Blood from Peripheral, Antecubital, Left Updated: 11/29/20 1950

## 2020-11-30 NOTE — CONSULTS
Consulted to this 20 year old female patient for skin breakdown. Past history of  anxiety, Lupus- on Plaquenil, prednisone and benlysta who presents to the Emergency Department  For worsening lower extremity painful ulcers that has worsened over the last 2 months patient refused complete assessment of LE wounds due to pain. She reports they were cleansed and covered last night. Foam dressings intact. She did allow dressing to posterior left thigh wound to be peeled back. Full thickness ulcerations with moist red and yellow adipose wound bed noted. Dressing reinforced. Foam dressings to BLE CDI. She also has some intact scabs to BUE that she reports are how the wounds to BLE started. Per Dr. Quinn's note, possible pyoderma. Patient will need biopsy and to follow up with derm. See below for recommendations.    BLE ulcerations:  1. Cleanse with saline   2. Pat dry   3. Paint periwound with cavilon  3. Apply foam dressing  5. Change every 3 days or prn excess drainage

## 2020-11-30 NOTE — ASSESSMENT & PLAN NOTE
With history of SLE, pyoderma gangrenosum is of concern .  Will need biopsy but there is concern for pathergy .  Dermatology evaluation as outpatient .  Continue wound care /wound culture   Generous analgesia   Continue Cefepime/vanco  She is an immunocompromised host

## 2020-11-30 NOTE — SUBJECTIVE & OBJECTIVE
Past Medical History:   Diagnosis Date    Lupus        History reviewed. No pertinent surgical history.    Review of patient's allergies indicates:   Allergen Reactions    Sulfamethoxazole-trimethoprim Itching     syncopal episode as a child       No current facility-administered medications on file prior to encounter.      Current Outpatient Medications on File Prior to Encounter   Medication Sig    belimumab (BENLYSTA) 200 mg/mL AtIn Inject 1 mL (200 mg total) into the skin every 7 days.    busPIRone (BUSPAR) 5 MG Tab Take 1 tablet (5 mg total) by mouth 2 (two) times daily as needed (anxiety).    hydroxychloroquine (PLAQUENIL) 200 mg tablet Take 1 tablet (200 mg total) by mouth 2 (two) times daily.    predniSONE (DELTASONE) 20 MG tablet Take 1 tablet (20 mg total) by mouth once daily.    propranoloL (INDERAL) 20 MG tablet Take 1 tablet (20 mg total) by mouth 2 (two) times daily.    betamethasone dipropionate (DIPROLENE) 0.05 % cream Apply topically 2 (two) times daily as needed. For lupus    clobetasoL (CLOBEX) 0.05 % shampoo     ferrous sulfate (IRON ORAL) Take by mouth.    fluocinolone and shower cap (DERMA-SMOOTHE/FS SCALP OIL) 0.01 % Oil Apply 1 application topically every evening.    hydrocortisone 2.5 % cream Apply topically 2 (two) times daily as needed. For lupus affecting face.    ketoconazole (NIZORAL) 2 % shampoo     methylPREDNISolone (MEDROL DOSEPACK) 4 mg tablet use as directed    mupirocin (BACTROBAN) 2 % ointment Apply topically 2 (two) times daily.    [DISCONTINUED] azaTHIOprine (IMURAN) 50 mg Tab Take 1 tablet (50 mg total) by mouth once daily.     Family History     Problem Relation (Age of Onset)    Lupus Sister        Tobacco Use    Smoking status: Never Smoker    Smokeless tobacco: Never Used   Substance and Sexual Activity    Alcohol use: Never     Frequency: Never    Drug use: Never    Sexual activity: Not on file     Review of Systems   Constitutional: Positive for  activity change and appetite change. Negative for chills and fatigue.   HENT: Negative for congestion, ear pain, facial swelling, sinus pressure and sore throat.    Eyes: Negative for pain.   Respiratory: Negative for apnea, chest tightness, shortness of breath and stridor.    Cardiovascular: Negative for chest pain, palpitations and leg swelling.   Gastrointestinal: Negative for abdominal distention, abdominal pain, diarrhea and nausea.   Endocrine: Negative for polydipsia and polyphagia.   Genitourinary: Negative for decreased urine volume, difficulty urinating, frequency and genital sores.   Musculoskeletal: Positive for arthralgias. Negative for gait problem.        Skin ulcers   Neurological: Positive for dizziness. Negative for light-headedness and headaches.   Hematological: Negative for adenopathy.   Psychiatric/Behavioral: Negative for agitation, confusion and decreased concentration.     Objective:     Vital Signs (Most Recent):  Temp: 97.6 °F (36.4 °C) (11/30/20 0419)  Pulse: 86 (11/30/20 0419)  Resp: 18 (11/30/20 0419)  BP: 103/64 (11/30/20 0419)  SpO2: (!) 94 % (11/30/20 0419) Vital Signs (24h Range):  Temp:  [97.6 °F (36.4 °C)-101.1 °F (38.4 °C)] 97.6 °F (36.4 °C)  Pulse:  [] 86  Resp:  [16-24] 18  SpO2:  [94 %-100 %] 94 %  BP: (103-156)/(60-85) 103/64     Weight: 77 kg (169 lb 12.1 oz)  Body mass index is 30.07 kg/m².    Physical Exam  Vitals signs and nursing note reviewed.   Constitutional:       Appearance: She is well-developed.   HENT:      Head: Normocephalic and atraumatic.   Eyes:      Pupils: Pupils are equal, round, and reactive to light.   Neck:      Musculoskeletal: Normal range of motion and neck supple.      Thyroid: No thyromegaly.      Trachea: No tracheal deviation.   Cardiovascular:      Rate and Rhythm: Normal rate and regular rhythm.   Pulmonary:      Effort: No respiratory distress.      Breath sounds: No wheezing or rales.   Abdominal:      General: Bowel sounds are normal.  There is no distension.      Palpations: Abdomen is soft.      Tenderness: There is no abdominal tenderness.   Skin:     General: Skin is warm and dry.      Coloration: Skin is not pale.      Comments: Please see pics   Neurological:      Mental Status: She is alert and oriented to person, place, and time.      Cranial Nerves: No cranial nerve deficit.      Coordination: Coordination normal.      Deep Tendon Reflexes: Reflexes are normal and symmetric.   Psychiatric:         Thought Content: Thought content normal.         Judgment: Judgment normal.           CRANIAL NERVES     CN III, IV, VI   Pupils are equal, round, and reactive to light.                   Significant Labs:   Blood Culture: No results for input(s): LABBLOO in the last 48 hours.  BMP:   Recent Labs   Lab 11/29/20 1912         K 3.6      CO2 23   BUN 12   CREATININE 0.7   CALCIUM 8.1*     CBC:   Recent Labs   Lab 11/29/20 1912   WBC 6.12   HGB 9.7*   HCT 31.0*        CMP:   Recent Labs   Lab 11/29/20 1912      K 3.6      CO2 23      BUN 12   CREATININE 0.7   CALCIUM 8.1*   PROT 8.2   ALBUMIN 3.2*   BILITOT 0.4   ALKPHOS 72   AST 58*   ALT 29   ANIONGAP 11   EGFRNONAA >60     All pertinent labs within the past 24 hours have been reviewed.    Significant Imaging: I have reviewed and interpreted all pertinent imaging results/findings within the past 24 hours.

## 2020-11-30 NOTE — SUBJECTIVE & OBJECTIVE
Interval History: Patient reports feeling much better today. Elevated temperature overnight. She has appt with derm and rheumatology tomorrow and needs punch biopsy for dx - will continue IV abx and dc in am if afebrile overnight so dermatology can see for biopsy. Pt and mother verbalized understanding of all.    Review of Systems   Constitutional: Positive for activity change and appetite change. Negative for chills and fatigue.   HENT: Negative for congestion, ear pain, facial swelling, sinus pressure and sore throat.    Eyes: Negative for pain.   Respiratory: Negative for apnea, chest tightness, shortness of breath and stridor.    Cardiovascular: Negative for chest pain, palpitations and leg swelling.   Gastrointestinal: Negative for abdominal distention, abdominal pain, diarrhea and nausea.   Endocrine: Negative for polydipsia and polyphagia.   Genitourinary: Negative for decreased urine volume, difficulty urinating, frequency and genital sores.   Musculoskeletal: Positive for arthralgias. Negative for gait problem.        Skin ulcers   Neurological: Positive for dizziness. Negative for light-headedness and headaches.   Hematological: Negative for adenopathy.   Psychiatric/Behavioral: Negative for agitation, confusion and decreased concentration.     Objective:     Vital Signs (Most Recent):  Temp: 98.1 °F (36.7 °C) (11/30/20 1507)  Pulse: 104 (11/30/20 1507)  Resp: 18 (11/30/20 1507)  BP: (!) 105/56 (11/30/20 1507)  SpO2: 100 % (11/30/20 1507) Vital Signs (24h Range):  Temp:  [96 °F (35.6 °C)-101.1 °F (38.4 °C)] 98.1 °F (36.7 °C)  Pulse:  [] 104  Resp:  [16-24] 18  SpO2:  [94 %-100 %] 100 %  BP: (103-156)/(56-85) 105/56     Weight: 77 kg (169 lb 12.1 oz)  Body mass index is 30.07 kg/m².    Intake/Output Summary (Last 24 hours) at 11/30/2020 6604  Last data filed at 11/29/2020 2253  Gross per 24 hour   Intake 2160 ml   Output --   Net 2160 ml      Physical Exam  Vitals signs and nursing note reviewed.    Constitutional:       Appearance: She is well-developed.   HENT:      Head: Normocephalic and atraumatic.   Eyes:      Pupils: Pupils are equal, round, and reactive to light.   Neck:      Musculoskeletal: Normal range of motion and neck supple.      Thyroid: No thyromegaly.      Trachea: No tracheal deviation.   Cardiovascular:      Rate and Rhythm: Normal rate and regular rhythm.   Pulmonary:      Effort: No respiratory distress.      Breath sounds: No wheezing or rales.   Abdominal:      General: Bowel sounds are normal. There is no distension.      Palpations: Abdomen is soft.      Tenderness: There is no abdominal tenderness.   Skin:     General: Skin is warm and dry.      Coloration: Skin is not pale.      Comments: Please see pics   Neurological:      Mental Status: She is alert and oriented to person, place, and time.      Cranial Nerves: No cranial nerve deficit.      Coordination: Coordination normal.      Deep Tendon Reflexes: Reflexes are normal and symmetric.   Psychiatric:         Thought Content: Thought content normal.         Judgment: Judgment normal.         Significant Labs:   Recent Lab Results       11/29/20  2249   11/29/20  2131   11/29/20 2010 11/29/20  1917   11/29/20  1912        Procalcitonin         0.12  Comment:  A concentration < 0.25 ng/mL represents a low risk bacterial   infection.  Procalcitonin may not be accurate among patients with localized   infection, recent trauma or major surgery, immunosuppressed state,   invasive fungal infection, renal dysfunction. Decisions regarding   initiation or continuation of antibiotic therapy should not be based   solely on procalcitonin levels.       Albumin         3.2     Alkaline Phosphatase         72     ALT         29     Anion Gap         11     Appearance, UA   Clear           AST         58     Bacteria, UA   Few           Baso #         0.00     Basophil %         0.0     Bilirubin (UA)   Negative           BILIRUBIN TOTAL          0.4  Comment:  For infants and newborns, interpretation of results should be based  on gestational age, weight and in agreement with clinical  observations.  Premature Infant recommended reference ranges:  Up to 24 hours.............<8.0 mg/dL  Up to 48 hours............<12.0 mg/dL  3-5 days..................<15.0 mg/dL  6-29 days.................<15.0 mg/dL       Blood Culture, Routine       No Growth to date[P] No Growth to date[P]     BUN         12     Calcium         8.1     Chloride         105     CO2         23     Color, UA   Yellow           Creatinine         0.7     CRP         8.7     Differential Method         Automated     eGFR if          >60     eGFR if non          >60  Comment:  Calculation used to obtain the estimated glomerular filtration  rate (eGFR) is the CKD-EPI equation.        Eos #         0.0     Eosinophil %         0.0     Glucose         101     Glucose, UA   Negative           Gran # (ANC)         5.6     Gran %         91.9     Hematocrit         31.0     Hemoglobin         9.7     Hepatitis C Ab         Negative     HIV 1/2 Ag/Ab         Negative     Hyaline Casts, UA   1           Immature Grans (Abs)         0.06  Comment:  Mild elevation in immature granulocytes is non specific and   can be seen in a variety of conditions including stress response,   acute inflammation, trauma and pregnancy. Correlation with other   laboratory and clinical findings is essential.       Immature Granulocytes         1.0     Ketones, UA   1+           Lactate, Julián 1.2  Comment:  Falsely low lactic acid results can be found in samples   containing >=13.0 mg/dL total bilirubin and/or >=3.5 mg/dL   direct bilirubin.         2.2  Comment:  Falsely low lactic acid results can be found in samples   containing >=13.0 mg/dL total bilirubin and/or >=3.5 mg/dL   direct bilirubin.       Leukocytes, UA   Trace           Lymph #         0.3     Lymph %         4.2     MCH          29.7     MCHC         31.3     MCV         95     Microscopic Comment   SEE COMMENT  Comment:  Other formed elements not mentioned in the report are not   present in the microscopic examination.              Mono #         0.2     Mono %         2.9     MPV         9.9     NITRITE UA   Negative           nRBC         0     Occult Blood UA   Negative           pH, UA   6.0           Platelets         239     Potassium         3.6     Preg Test, Ur   Negative           PROTEIN TOTAL         8.2     Protein, UA   1+  Comment:  Recommend a 24 hour urine protein or a urine   protein/creatinine ratio if globulin induced proteinuria is  clinically suspected.             RBC         3.27     RBC, UA   2           RDW         13.3     SARS-CoV-2 RNA, Amplification, Qual     Negative  Comment:  This test utilizes isothermal nucleic acid amplification   technology to detect the SARS-CoV-2 RdRp nucleic acid segment.   The analytical sensitivity (limit of detection) is 125 genome   equivalents/mL.   A POSITIVE result implies infection with the SARS-CoV-2 virus;  the patient is presumed to be contagious.    A NEGATIVE result means that SARS-CoV-2 nucleic acids are not  present above the limit of detection. A NEGATIVE result should be   treated as presumptive. It does not rule out the possibility of   COVID-19 and should not be the sole basis for treatment decisions.   If COVID-19 is strongly suspected based on clinical and exposure   history, re-testing using an alternate molecular assay should be   considered.   This test is only for use under the Food and Drug   Administration s Emergency Use Authorization (EUA).   Commercial kits are provided by [employer].   Performance characteristics of the EUA have been independently  verified by [facility] Department of  Pathology and Laboratory Medicine.   _________________________________________________________________  The [employer] NOW COVID-19 Letter of Authorization,  along with the   authorized Fact Sheet for Healthcare Providers, the authorized Fact  Sheet for Patients, and authorized labeling are available on the FDA   website:  www.fda.gov/MedicalDevices/Safety/EmergencySituations/twa838034.htm           Sed Rate         85     Sodium         139     Specific Gravity, UA   >=1.030           Specimen UA   Urine, Catheterized           UROBILINOGEN UA   Negative           WBC, UA   7           WBC         6.12                        All pertinent labs within the past 24 hours have been reviewed.    Significant Imaging: I have reviewed all pertinent imaging results/findings within the past 24 hours.

## 2020-11-30 NOTE — PROGRESS NOTES
[facility] Medicine  Progress Note    Patient Name: Dilcia Reed  MRN: [id number]  Patient Class: OP- Observation   Admission Date: 11/29/2020  Length of Stay: 0 days  Attending Physician: Adolfo Anguiano, *  Primary Care Provider: Godfrey Rosa MD        Subjective:     Principal Problem:Ulcers of both lower extremities, limited to breakdown of skin        HPI:   20 year old woman with history of  anxiety, Lupus- on Plaquenil, prednisone and benlysta who presents to the Emergency Department  For worsening lower extremity painful ulcers that has worsened over the last 2 months . Pt reports sores on arms and wounds on the back of both thighs. There is associated history of fever . In the Ed,she is in painful distress . She has been seen by rheumatology for the skin lesion and was referred to Dermatology but has not yet seen dermatology .    ED vitals-   Initial Vitals [11/29/20 1820]   BP Pulse Resp Temp SpO2   139/85 (!) 144 20 (!) 100.7 °F (38.2 °C) 97      She will be placed on observation .    Overview/Hospital Course:  Patient was kept on OBS for ulcers of both lower extremities under the care of Hospital Medicine. Wound care was consulted and pt was started on IV abx.    Interval History: Patient reports feeling much better today. Elevated temperature overnight. She has appt with derm and rheumatology tomorrow and needs punch biopsy for dx - will continue IV abx and dc in am if afebrile overnight so dermatology can see for biopsy. Pt and mother verbalized understanding of all.    Review of Systems   Constitutional: Positive for activity change and appetite change. Negative for chills and fatigue.   HENT: Negative for congestion, ear pain, facial swelling, sinus pressure and sore throat.    Eyes: Negative for pain.   Respiratory: Negative for apnea, chest tightness, shortness of breath and stridor.    Cardiovascular: Negative for chest pain, palpitations and leg swelling.    Gastrointestinal: Negative for abdominal distention, abdominal pain, diarrhea and nausea.   Endocrine: Negative for polydipsia and polyphagia.   Genitourinary: Negative for decreased urine volume, difficulty urinating, frequency and genital sores.   Musculoskeletal: Positive for arthralgias. Negative for gait problem.        Skin ulcers   Neurological: Positive for dizziness. Negative for light-headedness and headaches.   Hematological: Negative for adenopathy.   Psychiatric/Behavioral: Negative for agitation, confusion and decreased concentration.     Objective:     Vital Signs (Most Recent):  Temp: 98.1 °F (36.7 °C) (11/30/20 1507)  Pulse: 104 (11/30/20 1507)  Resp: 18 (11/30/20 1507)  BP: (!) 105/56 (11/30/20 1507)  SpO2: 100 % (11/30/20 1507) Vital Signs (24h Range):  Temp:  [96 °F (35.6 °C)-101.1 °F (38.4 °C)] 98.1 °F (36.7 °C)  Pulse:  [] 104  Resp:  [16-24] 18  SpO2:  [94 %-100 %] 100 %  BP: (103-156)/(56-85) 105/56     Weight: 77 kg (169 lb 12.1 oz)  Body mass index is 30.07 kg/m².    Intake/Output Summary (Last 24 hours) at 11/30/2020 1626  Last data filed at 11/29/2020 2253  Gross per 24 hour   Intake 2160 ml   Output --   Net 2160 ml      Physical Exam  Vitals signs and nursing note reviewed.   Constitutional:       Appearance: She is well-developed.   HENT:      Head: Normocephalic and atraumatic.   Eyes:      Pupils: Pupils are equal, round, and reactive to light.   Neck:      Musculoskeletal: Normal range of motion and neck supple.      Thyroid: No thyromegaly.      Trachea: No tracheal deviation.   Cardiovascular:      Rate and Rhythm: Normal rate and regular rhythm.   Pulmonary:      Effort: No respiratory distress.      Breath sounds: No wheezing or rales.   Abdominal:      General: Bowel sounds are normal. There is no distension.      Palpations: Abdomen is soft.      Tenderness: There is no abdominal tenderness.   Skin:     General: Skin is warm and dry.      Coloration: Skin is not pale.       Comments: Please see pics   Neurological:      Mental Status: She is alert and oriented to person, place, and time.      Cranial Nerves: No cranial nerve deficit.      Coordination: Coordination normal.      Deep Tendon Reflexes: Reflexes are normal and symmetric.   Psychiatric:         Thought Content: Thought content normal.         Judgment: Judgment normal.         Significant Labs:   Recent Lab Results       11/29/20 2249 11/29/20  2131   11/29/20 2010 11/29/20 1917   11/29/20  1912        Procalcitonin         0.12  Comment:  A concentration < 0.25 ng/mL represents a low risk bacterial   infection.  Procalcitonin may not be accurate among patients with localized   infection, recent trauma or major surgery, immunosuppressed state,   invasive fungal infection, renal dysfunction. Decisions regarding   initiation or continuation of antibiotic therapy should not be based   solely on procalcitonin levels.       Albumin         3.2     Alkaline Phosphatase         72     ALT         29     Anion Gap         11     Appearance, UA   Clear           AST         58     Bacteria, UA   Few           Baso #         0.00     Basophil %         0.0     Bilirubin (UA)   Negative           BILIRUBIN TOTAL         0.4  Comment:  For infants and newborns, interpretation of results should be based  on gestational age, weight and in agreement with clinical  observations.  Premature Infant recommended reference ranges:  Up to 24 hours.............<8.0 mg/dL  Up to 48 hours............<12.0 mg/dL  3-5 days..................<15.0 mg/dL  6-29 days.................<15.0 mg/dL       Blood Culture, Routine       No Growth to date[P] No Growth to date[P]     BUN         12     Calcium         8.1     Chloride         105     CO2         23     Color, UA   Yellow           Creatinine         0.7     CRP         8.7     Differential Method         Automated     eGFR if          >60     eGFR if non           >60  Comment:  Calculation used to obtain the estimated glomerular filtration  rate (eGFR) is the CKD-EPI equation.        Eos #         0.0     Eosinophil %         0.0     Glucose         101     Glucose, UA   Negative           Gran # (ANC)         5.6     Gran %         91.9     Hematocrit         31.0     Hemoglobin         9.7     Hepatitis C Ab         Negative     HIV 1/2 Ag/Ab         Negative     Hyaline Casts, UA   1           Immature Grans (Abs)         0.06  Comment:  Mild elevation in immature granulocytes is non specific and   can be seen in a variety of conditions including stress response,   acute inflammation, trauma and pregnancy. Correlation with other   laboratory and clinical findings is essential.       Immature Granulocytes         1.0     Ketones, UA   1+           Lactate, Julián 1.2  Comment:  Falsely low lactic acid results can be found in samples   containing >=13.0 mg/dL total bilirubin and/or >=3.5 mg/dL   direct bilirubin.         2.2  Comment:  Falsely low lactic acid results can be found in samples   containing >=13.0 mg/dL total bilirubin and/or >=3.5 mg/dL   direct bilirubin.       Leukocytes, UA   Trace           Lymph #         0.3     Lymph %         4.2     MCH         29.7     MCHC         31.3     MCV         95     Microscopic Comment   SEE COMMENT  Comment:  Other formed elements not mentioned in the report are not   present in the microscopic examination.              Mono #         0.2     Mono %         2.9     MPV         9.9     NITRITE UA   Negative           nRBC         0     Occult Blood UA   Negative           pH, UA   6.0           Platelets         239     Potassium         3.6     Preg Test, Ur   Negative           PROTEIN TOTAL         8.2     Protein, UA   1+  Comment:  Recommend a 24 hour urine protein or a urine   protein/creatinine ratio if globulin induced proteinuria is  clinically suspected.             RBC         3.27     RBC, UA   2            RDW         13.3     SARS-CoV-2 RNA, Amplification, Qual     Negative  Comment:  This test utilizes isothermal nucleic acid amplification   technology to detect the SARS-CoV-2 RdRp nucleic acid segment.   The analytical sensitivity (limit of detection) is 125 genome   equivalents/mL.   A POSITIVE result implies infection with the SARS-CoV-2 virus;  the patient is presumed to be contagious.    A NEGATIVE result means that SARS-CoV-2 nucleic acids are not  present above the limit of detection. A NEGATIVE result should be   treated as presumptive. It does not rule out the possibility of   COVID-19 and should not be the sole basis for treatment decisions.   If COVID-19 is strongly suspected based on clinical and exposure   history, re-testing using an alternate molecular assay should be   considered.   This test is only for use under the Food and Drug   Administration s Emergency Use Authorization (EUA).   Commercial kits are provided by [employer].   Performance characteristics of the EUA have been independently  verified by [facility] Department of  Pathology and Laboratory Medicine.   _________________________________________________________________  The ID NOW COVID-19 Letter of Authorization, along with the   authorized Fact Sheet for Healthcare Providers, the authorized Fact  Sheet for Patients, and authorized labeling are available on the FDA   website:  www.fda.gov/MedicalDevices/Safety/EmergencySituations/ezl879981.htm           Sed Rate         85     Sodium         139     Specific Gravity, UA   >=1.030           Specimen UA   Urine, Catheterized           UROBILINOGEN UA   Negative           WBC, UA   7           WBC         6.12                        All pertinent labs within the past 24 hours have been reviewed.    Significant Imaging: I have reviewed all pertinent imaging results/findings within the past 24 hours.      Assessment/Plan:      * Ulcers of both lower extremities, limited  to breakdown of skin    With history of SLE, pyoderma gangrenosum is of concern .  Will need biopsy but there is concern for pathergy .  Dermatology evaluation as outpatient .  Continue wound care /wound culture   Generous analgesia   Continue Cefepime/vanco  She is an immunocompromised host   11/30:  --has OP derm appt tomorrow  --wound care following  --continue IV cepemime/vanc    Anxiety    On buspirone    Tachycardia    On metoprolol, will monitor HR .  Could be related to pain .Will need further evaluation for other causes including PE if this persists .  She was tearful and in painful distress in the Ed      Long term current use of immunosuppressive drug    She needs close monitoring . Follow rheumatology     Discoid lupus erythematosus    on Plaquenil, prednisone and benlysta  Follow rheumatology     Other autoimmune hemolytic anemias    Will monitor closely , transfuse as needed    Systemic lupus erythematosus arthritis  --OP rheumatology appt tomorrow      VTE Risk Mitigation (From admission, onward)         Ordered     IP VTE HIGH RISK PATIENT  Once      11/29/20 2150     Place sequential compression device  Until discontinued      11/29/20 2150                Discharge Planning   MIKE:      Code Status: Full Code   Is the patient medically ready for discharge?:     Reason for patient still in hospital (select all that apply): Patient trending condition  Discharge Plan A: Home with family                  BROOKE Tatum  Department of Hospital Medicine   [facility]

## 2020-11-30 NOTE — PROGRESS NOTES
Pharmacist Renal Dose Adjustment Note    Dilcia Reed is a 20 y.o. female being treated with the medication cefepime.     Patient Data:    Vital Signs (Most Recent):  Temp: 97.6 °F (36.4 °C) (11/30/20 0419)  Pulse: 86 (11/30/20 0419)  Resp: 18 (11/30/20 0419)  BP: 103/64 (11/30/20 0419)  SpO2: (!) 94 % (11/30/20 0419)   Vital Signs (72h Range):  Temp:  [97.6 °F (36.4 °C)-101.1 °F (38.4 °C)]   Pulse:  []   Resp:  [16-24]   BP: (103-156)/(60-85)   SpO2:  [94 %-100 %]      Recent Labs   Lab 11/24/20  1441 11/29/20 1912   CREATININE 0.8 0.7     Serum creatinine: 0.7 mg/dL 11/29/20 1912  Estimated creatinine clearance: 125.9 mL/min    Medication:cefepime dose: 1g frequency Q12H will be changed to medication:cefepime dose:1g frequency:Q8H.     Pharmacist's Name: Chrissy Goddard  Pharmacist's Extension: [phone]

## 2020-11-30 NOTE — ASSESSMENT & PLAN NOTE
On metoprolol, will monitor HR .  Could be related to pain .Will need further evaluation for other causes including PE if this persists .  She was tearful and in painful distress in the Ed

## 2020-12-01 ENCOUNTER — OFFICE VISIT (OUTPATIENT)
Dept: DERMATOLOGY | Facility: CLINIC | Age: 20
End: 2020-12-01
Payer: OTHER GOVERNMENT

## 2020-12-01 ENCOUNTER — OFFICE VISIT (OUTPATIENT)
Dept: RHEUMATOLOGY | Facility: CLINIC | Age: 20
End: 2020-12-01
Payer: OTHER GOVERNMENT

## 2020-12-01 VITALS
HEIGHT: 63 IN | DIASTOLIC BLOOD PRESSURE: 59 MMHG | WEIGHT: 172.38 LBS | RESPIRATION RATE: 14 BRPM | OXYGEN SATURATION: 98 % | HEART RATE: 97 BPM | SYSTOLIC BLOOD PRESSURE: 115 MMHG | TEMPERATURE: 99 F | BODY MASS INDEX: 30.54 KG/M2

## 2020-12-01 VITALS — HEIGHT: 63 IN | WEIGHT: 165.81 LBS | BODY MASS INDEX: 29.38 KG/M2

## 2020-12-01 DIAGNOSIS — L97.911 ULCERS OF BOTH LOWER EXTREMITIES, LIMITED TO BREAKDOWN OF SKIN: ICD-10-CM

## 2020-12-01 DIAGNOSIS — L40.9 PSORIASIS OF SCALP: ICD-10-CM

## 2020-12-01 DIAGNOSIS — L97.921 ULCERS OF BOTH LOWER EXTREMITIES, LIMITED TO BREAKDOWN OF SKIN: ICD-10-CM

## 2020-12-01 DIAGNOSIS — Z79.899 LONG-TERM USE OF PLAQUENIL: ICD-10-CM

## 2020-12-01 DIAGNOSIS — M32.9 SYSTEMIC LUPUS ERYTHEMATOSUS ARTHRITIS: ICD-10-CM

## 2020-12-01 DIAGNOSIS — M32.19 OTHER SYSTEMIC LUPUS ERYTHEMATOSUS WITH OTHER ORGAN INVOLVEMENT: ICD-10-CM

## 2020-12-01 DIAGNOSIS — L93.0 DISCOID LUPUS ERYTHEMATOSUS: Primary | ICD-10-CM

## 2020-12-01 DIAGNOSIS — Z79.899 LONG TERM CURRENT USE OF IMMUNOSUPPRESSIVE DRUG: ICD-10-CM

## 2020-12-01 DIAGNOSIS — L30.9 DERMATITIS: Primary | ICD-10-CM

## 2020-12-01 PROBLEM — R00.0 TACHYCARDIA: Status: RESOLVED | Noted: 2020-11-24 | Resolved: 2020-12-01

## 2020-12-01 PROBLEM — R06.02 SOB (SHORTNESS OF BREATH): Status: RESOLVED | Noted: 2020-11-29 | Resolved: 2020-12-01

## 2020-12-01 LAB
ANION GAP SERPL CALC-SCNC: 12 MMOL/L (ref 8–16)
BASOPHILS # BLD AUTO: 0 K/UL (ref 0–0.2)
BASOPHILS NFR BLD: 0 % (ref 0–1.9)
BUN SERPL-MCNC: 8 MG/DL (ref 6–20)
CALCIUM SERPL-MCNC: 7.9 MG/DL (ref 8.7–10.5)
CHLORIDE SERPL-SCNC: 111 MMOL/L (ref 95–110)
CO2 SERPL-SCNC: 20 MMOL/L (ref 23–29)
CREAT SERPL-MCNC: 0.6 MG/DL (ref 0.5–1.4)
DIFFERENTIAL METHOD: ABNORMAL
EOSINOPHIL # BLD AUTO: 0 K/UL (ref 0–0.5)
EOSINOPHIL NFR BLD: 0 % (ref 0–8)
ERYTHROCYTE [DISTWIDTH] IN BLOOD BY AUTOMATED COUNT: 13.6 % (ref 11.5–14.5)
EST. GFR  (AFRICAN AMERICAN): >60 ML/MIN/1.73 M^2
EST. GFR  (NON AFRICAN AMERICAN): >60 ML/MIN/1.73 M^2
GLUCOSE SERPL-MCNC: 72 MG/DL (ref 70–110)
HCT VFR BLD AUTO: 28 % (ref 37–48.5)
HGB BLD-MCNC: 8.8 G/DL (ref 12–16)
IMM GRANULOCYTES # BLD AUTO: 0.01 K/UL (ref 0–0.04)
IMM GRANULOCYTES NFR BLD AUTO: 0.4 % (ref 0–0.5)
LYMPHOCYTES # BLD AUTO: 0.5 K/UL (ref 1–4.8)
LYMPHOCYTES NFR BLD: 17.3 % (ref 18–48)
MCH RBC QN AUTO: 30.3 PG (ref 27–31)
MCHC RBC AUTO-ENTMCNC: 31.4 G/DL (ref 32–36)
MCV RBC AUTO: 97 FL (ref 82–98)
MONOCYTES # BLD AUTO: 0.1 K/UL (ref 0.3–1)
MONOCYTES NFR BLD: 4.1 % (ref 4–15)
NEUTROPHILS # BLD AUTO: 2.1 K/UL (ref 1.8–7.7)
NEUTROPHILS NFR BLD: 78.2 % (ref 38–73)
NRBC BLD-RTO: 0 /100 WBC
PLATELET # BLD AUTO: 199 K/UL (ref 150–350)
PMV BLD AUTO: 10.3 FL (ref 9.2–12.9)
POTASSIUM SERPL-SCNC: 3.4 MMOL/L (ref 3.5–5.1)
RBC # BLD AUTO: 2.9 M/UL (ref 4–5.4)
SODIUM SERPL-SCNC: 143 MMOL/L (ref 136–145)
VANCOMYCIN TROUGH SERPL-MCNC: 11.8 UG/ML (ref 10–22)
WBC # BLD AUTO: 2.66 K/UL (ref 3.9–12.7)

## 2020-12-01 PROCEDURE — 88342 CHG IMMUNOCYTOCHEMISTRY: ICD-10-PCS | Mod: 26,,, | Performed by: PATHOLOGY

## 2020-12-01 PROCEDURE — 88305 TISSUE EXAM BY PATHOLOGIST: CPT | Mod: 26,,, | Performed by: PATHOLOGY

## 2020-12-01 PROCEDURE — 99213 OFFICE O/P EST LOW 20 MIN: CPT | Mod: PBBFAC,25 | Performed by: STUDENT IN AN ORGANIZED HEALTH CARE EDUCATION/TRAINING PROGRAM

## 2020-12-01 PROCEDURE — 63600175 PHARM REV CODE 636 W HCPCS: Performed by: STUDENT IN AN ORGANIZED HEALTH CARE EDUCATION/TRAINING PROGRAM

## 2020-12-01 PROCEDURE — 99214 OFFICE O/P EST MOD 30 MIN: CPT | Mod: PBBFAC,27 | Performed by: INTERNAL MEDICINE

## 2020-12-01 PROCEDURE — 11104 PUNCH BX SKIN SINGLE LESION: CPT | Mod: S$PBB,,, | Performed by: STUDENT IN AN ORGANIZED HEALTH CARE EDUCATION/TRAINING PROGRAM

## 2020-12-01 PROCEDURE — 88312 SPECIAL STAINS GROUP 1: CPT | Mod: 59 | Performed by: PATHOLOGY

## 2020-12-01 PROCEDURE — 88342 IMHCHEM/IMCYTCHM 1ST ANTB: CPT | Performed by: PATHOLOGY

## 2020-12-01 PROCEDURE — 36415 COLL VENOUS BLD VENIPUNCTURE: CPT

## 2020-12-01 PROCEDURE — 88341 PR IHC OR ICC EACH ADD'L SINGLE ANTIBODY  STAINPR: ICD-10-PCS | Mod: 26,,, | Performed by: PATHOLOGY

## 2020-12-01 PROCEDURE — 11104 PR PUNCH BIOPSY, SKIN, SINGLE LESION: ICD-10-PCS | Mod: S$PBB,,, | Performed by: STUDENT IN AN ORGANIZED HEALTH CARE EDUCATION/TRAINING PROGRAM

## 2020-12-01 PROCEDURE — 88312 PR  SPECIAL STAINS,GROUP I: ICD-10-PCS | Mod: 26,,, | Performed by: PATHOLOGY

## 2020-12-01 PROCEDURE — 88342 IMHCHEM/IMCYTCHM 1ST ANTB: CPT | Mod: 26,,, | Performed by: PATHOLOGY

## 2020-12-01 PROCEDURE — 99213 PR OFFICE/OUTPT VISIT, EST, LEVL III, 20-29 MIN: ICD-10-PCS | Mod: 25,S$PBB,, | Performed by: STUDENT IN AN ORGANIZED HEALTH CARE EDUCATION/TRAINING PROGRAM

## 2020-12-01 PROCEDURE — 88305 TISSUE EXAM BY PATHOLOGIST: ICD-10-PCS | Mod: 26,,, | Performed by: PATHOLOGY

## 2020-12-01 PROCEDURE — 88341 IMHCHEM/IMCYTCHM EA ADD ANTB: CPT | Mod: 59 | Performed by: PATHOLOGY

## 2020-12-01 PROCEDURE — 63600175 PHARM REV CODE 636 W HCPCS: Performed by: INTERNAL MEDICINE

## 2020-12-01 PROCEDURE — 88341 IMHCHEM/IMCYTCHM EA ADD ANTB: CPT | Mod: 26,,, | Performed by: PATHOLOGY

## 2020-12-01 PROCEDURE — 80048 BASIC METABOLIC PNL TOTAL CA: CPT

## 2020-12-01 PROCEDURE — 96376 TX/PRO/DX INJ SAME DRUG ADON: CPT

## 2020-12-01 PROCEDURE — 85025 COMPLETE CBC W/AUTO DIFF WBC: CPT

## 2020-12-01 PROCEDURE — 99999 PR PBB SHADOW E&M-EST. PATIENT-LVL III: ICD-10-PCS | Mod: PBBFAC,,, | Performed by: STUDENT IN AN ORGANIZED HEALTH CARE EDUCATION/TRAINING PROGRAM

## 2020-12-01 PROCEDURE — 25000003 PHARM REV CODE 250: Performed by: INTERNAL MEDICINE

## 2020-12-01 PROCEDURE — 11104 PUNCH BX SKIN SINGLE LESION: CPT | Mod: PBBFAC | Performed by: STUDENT IN AN ORGANIZED HEALTH CARE EDUCATION/TRAINING PROGRAM

## 2020-12-01 PROCEDURE — 99999 PR PBB SHADOW E&M-EST. PATIENT-LVL IV: ICD-10-PCS | Mod: PBBFAC,,, | Performed by: INTERNAL MEDICINE

## 2020-12-01 PROCEDURE — 99215 OFFICE O/P EST HI 40 MIN: CPT | Mod: S$PBB,,, | Performed by: INTERNAL MEDICINE

## 2020-12-01 PROCEDURE — 99999 PR PBB SHADOW E&M-EST. PATIENT-LVL IV: CPT | Mod: PBBFAC,,, | Performed by: INTERNAL MEDICINE

## 2020-12-01 PROCEDURE — 88312 SPECIAL STAINS GROUP 1: CPT | Mod: 26,,, | Performed by: PATHOLOGY

## 2020-12-01 PROCEDURE — 80202 ASSAY OF VANCOMYCIN: CPT

## 2020-12-01 PROCEDURE — G0378 HOSPITAL OBSERVATION PER HR: HCPCS

## 2020-12-01 PROCEDURE — 99213 OFFICE O/P EST LOW 20 MIN: CPT | Mod: 25,S$PBB,, | Performed by: STUDENT IN AN ORGANIZED HEALTH CARE EDUCATION/TRAINING PROGRAM

## 2020-12-01 PROCEDURE — 99215 PR OFFICE/OUTPT VISIT, EST, LEVL V, 40-54 MIN: ICD-10-PCS | Mod: S$PBB,,, | Performed by: INTERNAL MEDICINE

## 2020-12-01 PROCEDURE — 88305 TISSUE EXAM BY PATHOLOGIST: CPT | Performed by: PATHOLOGY

## 2020-12-01 PROCEDURE — 99999 PR PBB SHADOW E&M-EST. PATIENT-LVL III: CPT | Mod: PBBFAC,,, | Performed by: STUDENT IN AN ORGANIZED HEALTH CARE EDUCATION/TRAINING PROGRAM

## 2020-12-01 RX ORDER — MYCOPHENOLATE MOFETIL 500 MG/1
500 TABLET ORAL 2 TIMES DAILY
Qty: 60 TABLET | Refills: 2 | Status: SHIPPED | OUTPATIENT
Start: 2020-12-01 | End: 2021-02-12 | Stop reason: SDUPTHER

## 2020-12-01 RX ORDER — PREDNISONE 20 MG/1
40 TABLET ORAL DAILY
Qty: 60 TABLET | Refills: 0 | Status: SHIPPED | OUTPATIENT
Start: 2020-12-01 | End: 2020-12-30 | Stop reason: SDUPTHER

## 2020-12-01 RX ORDER — AMOXICILLIN AND CLAVULANATE POTASSIUM 875; 125 MG/1; MG/1
1 TABLET, FILM COATED ORAL EVERY 12 HOURS
Qty: 20 TABLET | Refills: 0 | Status: SHIPPED | OUTPATIENT
Start: 2020-12-01 | End: 2020-12-08 | Stop reason: ALTCHOICE

## 2020-12-01 RX ADMIN — CEFEPIME HYDROCHLORIDE 1 G: 1 INJECTION, SOLUTION INTRAVENOUS at 08:12

## 2020-12-01 RX ADMIN — PROPRANOLOL HYDROCHLORIDE 20 MG: 20 TABLET ORAL at 08:12

## 2020-12-01 RX ADMIN — VANCOMYCIN HYDROCHLORIDE 1250 MG: 1.25 INJECTION, POWDER, LYOPHILIZED, FOR SOLUTION INTRAVENOUS at 08:12

## 2020-12-01 RX ADMIN — HYDROXYCHLOROQUINE SULFATE 200 MG: 200 TABLET, FILM COATED ORAL at 08:12

## 2020-12-01 RX ADMIN — PREDNISONE 20 MG: 20 TABLET ORAL at 08:12

## 2020-12-01 NOTE — PROGRESS NOTES
Subjective:       Patient ID:  Dilcia Reed is a 20 y.o. female who presents for   Chief Complaint   Patient presents with    Lesion     scalp and behind legs and arms, tx murpicion      History of Present Illness: The patient presents with chief complaint of lesion .  Location: legs and scalp and arms   Duration: 3 weeks   Signs/Symptoms: painful   Prior treatments: mupirocin    Patient has hx of SLE, currently seeing rheumatology. She was recently hospitalized with numerous slow healing wounds on buttocks, arms, legs.    Patient also has hx of psoriasis of scalp. Using lidex oil without much relief.      Review of Systems   Skin: Positive for rash. Negative for itching.   Hematologic/Lymphatic: Does not bruise/bleed easily.        Objective:    Physical Exam   Constitutional: She appears well-developed and well-nourished. No distress.   Neurological: She is alert and oriented to person, place, and time. She is not disoriented.   Psychiatric: She has a normal mood and affect.   Skin:   Areas Examined (abnormalities noted in diagram):   Scalp / Hair Palpated and Inspected  Head / Face Inspection Performed  Neck Inspection Performed  RUE Inspected  LUE Inspection Performed  RLE Inspected  LLE Inspection Performed                   Diagram Legend     Erythematous scaling macule/papule c/w actinic keratosis       Vascular papule c/w angioma      Pigmented verrucoid papule/plaque c/w seborrheic keratosis      Yellow umbilicated papule c/w sebaceous hyperplasia      Irregularly shaped tan macule c/w lentigo     1-2 mm smooth white papules consistent with Milia      Movable subcutaneous cyst with punctum c/w epidermal inclusion cyst      Subcutaneous movable cyst c/w pilar cyst      Firm pink to brown papule c/w dermatofibroma      Pedunculated fleshy papule(s) c/w skin tag(s)      Evenly pigmented macule c/w junctional nevus     Mildly variegated pigmented, slightly irregular-bordered macule c/w mildly atypical nevus       Flesh colored to evenly pigmented papule c/w intradermal nevus       Pink pearly papule/plaque c/w basal cell carcinoma      Erythematous hyperkeratotic cursted plaque c/w SCC      Surgical scar with no sign of skin cancer recurrence      Open and closed comedones      Inflammatory papules and pustules      Verrucoid papule consistent consistent with wart     Erythematous eczematous patches and plaques     Dystrophic onycholytic nail with subungual debris c/w onychomycosis     Umbilicated papule    Erythematous-base heme-crusted tan verrucoid plaque consistent with inflamed seborrheic keratosis     Erythematous Silvery Scaling Plaque c/w Psoriasis     See annotation      Assessment / Plan:      Pathology Orders:     Normal Orders This Visit    Specimen to Pathology, Dermatology     Comments:    Number of Specimens:->1  ------------------------->-------------------------  Spec 1 Procedure:->Biopsy  Spec 1 Clinical Impression:->lupus profundus vs others  Spec 1 Source:->right forearm    Questions:    Procedure Type: Dermatology and skin neoplasms    Number of Specimens: 1    ------------------------: -------------------------    Spec 1 Procedure: Biopsy    Spec 1 Clinical Impression: lupus profundus vs others    Spec 1 Source: right forearm        Dermatitis  -     Specimen to Pathology, Dermatology  Punch biopsy procedure note:  Punch biopsy performed after verbal consent obtained. Area marked and prepped with alcohol. Approximately 1cc of 1% lidocaine with epinephrine injected. 4 mm disposable punch used to remove lesion. Hemostasis obtained and biopsy site closed with 1 - 2 ethilon sutures. Wound care instructions reviewed with patient and handout given.    Psoriasis of scalp  - Continue lidex oil  - Add Tsal to regimen to remove thick scales on scalp           Follow up in about 6 months (around 6/1/2021), or if symptoms worsen or fail to improve.

## 2020-12-01 NOTE — NURSING
Pt stable, discharge instructions and medications reviewed with patient. Informed pt of when appointments were and where to  RX. IV discontinued.   Tele discontinued, monitor returned to tele.

## 2020-12-01 NOTE — LETTER
December 1, 2020      Alicia Boyce PA-C  [address]  [place] [address]           [facility]  [address]  [place] [address]  Phone: [phone]  Fax: [phone]          Patient: Dilcia Reed   MR Number: [id number]   YOB: 2000   Date of Visit: 12/1/2020       Dear Alicia Boyce:    Thank you for referring Dilcia Reed to me for evaluation. Attached you will find relevant portions of my assessment and plan of care.    If you have questions, please do not hesitate to call me. I look forward to following Dilcia Reed along with you.    Sincerely,    Mihaela Leo MD    Enclosure  CC:  No Recipients    If you would like to receive this communication electronically, please contact [email or web address] or [phone] to request more information on [employer] access.    For providers and/or their staff who would like to refer a patient to [facility], please contact us through our one-stop-shop provider referral line, [facility], [place], at [phone].    If you feel you have received this communication in error or would no longer like to receive these types of communications, please e-mail [email or web address]

## 2020-12-01 NOTE — DISCHARGE SUMMARY
[facility] Hospital Medicine  Discharge Summary      Patient Name: Dilcia Reed  MRN: [id number]  Admission Date: 11/29/2020  Hospital Length of Stay: 0 days  Discharge Date and Time: No discharge date for patient encounter.  Attending Physician: Adolfo Anguiano, *   Discharging Provider: BROOKE Tatum  Primary Care Provider: Godfrey Rosa MD      HPI:    20 year old woman with history of  anxiety, Lupus- on Plaquenil, prednisone and benlysta who presents to the Emergency Department  For worsening lower extremity painful ulcers that has worsened over the last 2 months . Pt reports sores on arms and wounds on the back of both thighs. There is associated history of fever . In the Ed,she is in painful distress . She has been seen by rheumatology for the skin lesion and was referred to Dermatology but has not yet seen dermatology .    ED vitals-   Initial Vitals [11/29/20 1820]   BP Pulse Resp Temp SpO2   139/85 (!) 144 20 (!) 100.7 °F (38.2 °C) 97      She will be placed on observation .    * No surgery found *      Hospital Course:   Patient was kept on OBS for ulcers of both lower extremities under the care of Hospital Medicine. Wound care was consulted and pt was started on IV abx. 11/30: Patient reports feeling much better today. Elevated temperature overnight. She has appt with derm and rheumatology tomorrow and needs punch biopsy for dx - will continue IV abx and dc in am if afebrile overnight so dermatology can see for biopsy. Pt and mother verbalized understanding of all. 12/1: Patient afebrile X 24 hours. Discussed with Dr. Quinn (ID) who recommends PO augmentin 875 BID X 10 days. Pt will take this and will f/u OP today with dermatology and rheumatology. Discussed all with patient, who verbalized understanding. Patient was seen and examined and deemed stable for discharge home.     Consults:   Consults (From admission, onward)        Status Ordering Provider     Pharmacy to  dose Vancomycin consult  Once     Provider:  (Not yet assigned)    Acknowledged SRINI ACOSTA          No new Assessment & Plan notes have been filed under this hospital service since the last note was generated.  Service: Hospital Medicine    Final Active Diagnoses:    Diagnosis Date Noted POA    PRINCIPAL PROBLEM:  Ulcers of both lower extremities, limited to breakdown of skin [L97.911, L97.921] 11/30/2020 Yes    Anxiety [F41.9] 11/24/2020 Yes    Long term current use of immunosuppressive drug [Z79.899] 05/26/2020 Not Applicable    Discoid lupus erythematosus [L93.0] 03/11/2020 Yes    Other autoimmune hemolytic anemias [D59.19] 03/11/2020 Yes    Systemic lupus erythematosus arthritis [M32.9] 03/06/2020 Yes      Problems Resolved During this Admission:    Diagnosis Date Noted Date Resolved POA    SOB (shortness of breath) [R06.02] 11/29/2020 12/01/2020 Yes    Tachycardia [R00.0] 11/24/2020 12/01/2020 Yes       Discharged Condition: stable    Disposition: Home or Self Care    Follow Up:  Follow-up Information     Mihaela Leo MD Today.    Specialty: Dermatology  Why: skin ulcers   Contact information:  [address]  [place] [address] [phone]             Godfrey Rosa MD In 2 weeks.    Specialty: Family Medicine  Why: hospital follow up  Contact information:  [address] [facility] [address]  [phone]                 Patient Instructions:      Diet Adult Regular     Notify your health care provider if you experience any of the following:  temperature >100.4     Notify your health care provider if you experience any of the following:  severe uncontrolled pain     Notify your health care provider if you experience any of the following:  redness, tenderness, or signs of infection (pain, swelling, redness, odor or green/yellow discharge around incision site)     Activity as tolerated       Significant Diagnostic Studies: Labs:   BMP:   Recent Labs   Lab 11/29/20 1912  12/01/20 0627    72    143   K 3.6 3.4*    111*   CO2 23 20*   BUN 12 8   CREATININE 0.7 0.6   CALCIUM 8.1* 7.9*   , CMP   Recent Labs   Lab 11/29/20 1912 12/01/20 0627    143   K 3.6 3.4*    111*   CO2 23 20*    72   BUN 12 8   CREATININE 0.7 0.6   CALCIUM 8.1* 7.9*   PROT 8.2  --    ALBUMIN 3.2*  --    BILITOT 0.4  --    ALKPHOS 72  --    AST 58*  --    ALT 29  --    ANIONGAP 11 12   ESTGFRAFRICA >60 >60   EGFRNONAA >60 >60   , CBC   Recent Labs   Lab 11/29/20 1912 12/01/20 0831   WBC 6.12 2.66*   HGB 9.7* 8.8*   HCT 31.0* 28.0*    199    and All labs within the past 24 hours have been reviewed    Pending Diagnostic Studies:     Procedure Component Value Units Date/Time    VANCOMYCIN, TROUGH [[id number]] Collected: 12/01/20 0831    Order Status: Sent Lab Status: In process Updated: 12/01/20 0917    Specimen: Blood          Medications:  Reconciled Home Medications:      Medication List      START taking these medications    amoxicillin-clavulanate 875-125mg 875-125 mg per tablet  Commonly known as: AUGMENTIN  Take 1 tablet by mouth every 12 (twelve) hours. for 10 days        CONTINUE taking these medications    BENLYSTA 200 mg/mL Atin  Generic drug: belimumab  Inject 1 mL (200 mg total) into the skin every 7 days.     betamethasone dipropionate 0.05 % cream  Apply topically 2 (two) times daily as needed. For lupus     busPIRone 5 MG Tab  Commonly known as: BUSPAR  Take 1 tablet (5 mg total) by mouth 2 (two) times daily as needed (anxiety).     clobetasoL 0.05 % shampoo  Commonly known as: CLOBEX     fluocinolone and shower cap 0.01 % Oil  Commonly known as: DERMA-SMOOTHE/FS SCALP OIL  Apply 1 application topically every evening.     hydrocortisone 2.5 % cream  Apply topically 2 (two) times daily as needed. For lupus affecting face.     hydrOXYchloroQUINE 200 mg tablet  Commonly known as: PLAQUENIL  Take 1 tablet (200 mg total) by mouth 2 (two) times daily.     IRON  ORAL  Take by mouth.     ketoconazole 2 % shampoo  Commonly known as: NIZORAL     methylPREDNISolone 4 mg tablet  Commonly known as: MEDROL DOSEPACK  use as directed     mupirocin 2 % ointment  Commonly known as: BACTROBAN  Apply topically 2 (two) times daily.     predniSONE 20 MG tablet  Commonly known as: DELTASONE  Take 1 tablet (20 mg total) by mouth once daily.     propranoloL 20 MG tablet  Commonly known as: INDERAL  Take 1 tablet (20 mg total) by mouth 2 (two) times daily.            Indwelling Lines/Drains at time of discharge:   Lines/Drains/Airways     None                 Time spent on the discharge of patient: 90 minutes  Patient was seen and examined on the date of discharge and determined to be suitable for discharge.         LOGAN Tatum-PRATIBHA  Department of Hospital Medicine  [facility]

## 2020-12-01 NOTE — PROGRESS NOTES
Pharmacokinetic Assessment Follow Up: IV Vancomycin    Vancomycin serum concentration assessment(s):    The trough level was drawn correctly and can be used to guide therapy at this time. The measurement is within the desired definitive target range of 10 to 15 mcg/mL.    Vancomycin Regimen Plan:    Continue regimen of Vancomycin 1250 mg IV every 12 hours with next serum trough concentration measured at 0800 prior to AM dose on 12/3/20.     Drug levels (last 3 results):  Recent Labs   Lab Result Units 12/01/20  0831   Vancomycin-Trough ug/mL 11.8       Pharmacy will continue to follow and monitor vancomycin.    Please contact pharmacy at extension [phone] for questions regarding this assessment.    Thank you for the consult,   Addis Joyner PharmD       Patient brief summary:  Dilcia Reed is a 20 y.o. female initiated on antimicrobial therapy with IV Vancomycin for treatment of skin & soft tissue infection    The patient's current regimen is Vancomycin 1250 mg IV every 12 hours    Drug Allergies:   Review of patient's allergies indicates:   Allergen Reactions    Sulfamethoxazole-trimethoprim Itching     syncopal episode as a child       Actual Body Weight:   78.2 kg    Renal Function:   Estimated Creatinine Clearance: 148 mL/min (based on SCr of 0.6 mg/dL).,     Dialysis Method (if applicable):  N/A    CBC (last 72 hours):  Recent Labs   Lab Result Units 11/29/20 1912 12/01/20  0831   WBC K/uL 6.12 2.66*   Hemoglobin g/dL 9.7* 8.8*   Hematocrit % 31.0* 28.0*   Platelets K/uL 239 199   Gran % % 91.9* 78.2*   Lymph % % 4.2* 17.3*   Mono % % 2.9* 4.1   Eosinophil % % 0.0 0.0   Basophil % % 0.0 0.0   Differential Method  Automated Automated       Metabolic Panel (last 72 hours):  Recent Labs   Lab Result Units 11/29/20 1912 11/29/20  2131 12/01/20  0627   Sodium mmol/L 139  --  143   Potassium mmol/L 3.6  --  3.4*   Chloride mmol/L 105  --  111*   CO2 mmol/L 23  --  20*   Glucose mg/dL 101  --  72   Glucose, UA   --   Negative  --    BUN mg/dL 12  --  8   Creatinine mg/dL 0.7  --  0.6   Albumin g/dL 3.2*  --   --    Total Bilirubin mg/dL 0.4  --   --    Alkaline Phosphatase U/L 72  --   --    AST U/L 58*  --   --    ALT U/L 29  --   --        Vancomycin Administrations:  vancomycin given in the last 96 hours                   vancomycin 1.25 g in dextrose 5% 250 mL IVPB (ready to mix) (mg) 1,250 mg New Bag 12/01/20 0849     1,250 mg New Bag 11/30/20 2010     1,250 mg New Bag  1036    vancomycin (VANCOCIN) 2,250 mg in dextrose 5 % 500 mL IVPB (mg) 2,250 mg New Bag 11/29/20 2052                Microbiologic Results:  Microbiology Results (last 7 days)     Procedure Component Value Units Date/Time    Culture, Anaerobe [[id number]] Collected: 11/30/20 0656    Order Status: Completed Specimen: Wound from Leg, Left Updated: 12/01/20 0730     Anaerobic Culture Culture in progress    Blood culture x two cultures. Draw prior to antibiotics. [[id number]] Collected: 11/29/20 1912    Order Status: Completed Specimen: Blood from Peripheral, Antecubital, Right Updated: 12/01/20 0613     Blood Culture, Routine No Growth to date      No Growth to date    Narrative:      Aerobic and anaerobic    Blood culture x two cultures. Draw prior to antibiotics. [[id number]] Collected: 11/29/20 1917    Order Status: Completed Specimen: Blood from Peripheral, Antecubital, Left Updated: 12/01/20 0613     Blood Culture, Routine No Growth to date      No Growth to date    Narrative:      Aerobic and anaerobic    Aerobic culture [[id number]] Collected: 11/30/20 0656    Order Status: Sent Specimen: Wound from Leg, Left Updated: 11/30/20 9307

## 2020-12-01 NOTE — PLAN OF CARE
Pt to DC home with no needs.  All F/U appointments made.   12/01/20 1121   Final Note   Assessment Type Final Discharge Note   Anticipated Discharge Disposition Home   Hospital Follow Up  Appt(s) scheduled? Yes   Right Care Referral Info   Post Acute Recommendation No Care

## 2020-12-01 NOTE — PROGRESS NOTES
RHEUMATOLOGY CLINIC FOLLOW UP VISIT  Chief complaints:-  Post hospital follow up.     HPI:-  Dilcia Quintero a 20 y.o. pleasant female comes in for a follow up visit. She was discharged from hospital today morning and was scheduled this appointment to follow-up for her lupus.  She was admitted for worsening skin rash and ulcers.  She saw the dermatologist and had biopsy done.  For the past month she has noticed sudden onset skin rash associated with deep ulcers involving her bilateral lower extremities.  She was put on antibiotic due to signs of infections on the ulcer.  She is on Benlysta, Plaquenil and prednisone for lupus.  Her prednisone dose was increased few weeks ago to 20 mg and was advised to call us within a couple weeks.  She has been continuing the same dose for the past 3 months.  No significant joint pain today.  Worsening skin rash over her face.    Review of Systems   Constitutional: Negative for chills and fever.   HENT: Negative for congestion and sore throat.    Eyes: Negative for blurred vision and redness.   Respiratory: Negative for cough and shortness of breath.    Cardiovascular: Negative for chest pain and leg swelling.   Gastrointestinal: Negative for abdominal pain.   Genitourinary: Negative for dysuria.   Musculoskeletal: Positive for back pain, joint pain, myalgias and neck pain. Negative for falls.   Skin: Positive for rash.   Neurological: Negative for headaches.   Endo/Heme/Allergies: Does not bruise/bleed easily.   Psychiatric/Behavioral: Negative for memory loss. The patient does not have insomnia.        Past Medical History:   Diagnosis Date    Lupus        History reviewed. No pertinent surgical history.     Social History     Tobacco Use    Smoking status: Never Smoker    Smokeless tobacco: Never Used   Substance Use Topics    Alcohol use: Never     Frequency: Never    Drug use: Never       Family History   Problem  "Relation Age of Onset    Lupus Sister        Review of patient's allergies indicates:   Allergen Reactions    Sulfamethoxazole-trimethoprim Itching     syncopal episode as a child       Vitals:    12/01/20 1534   Weight: 75.2 kg (165 lb 12.6 oz)   Height: 5' 3" (1.6 m)   PainSc:   8   PainLoc: Leg       Physical Exam   Constitutional: She is oriented to person, place, and time and well-developed, well-nourished, and in no distress. No distress.   HENT:   Head: Normocephalic.   Mouth/Throat: Oropharynx is clear and moist.   Eyes: Pupils are equal, round, and reactive to light. Conjunctivae and EOM are normal.   Neck: Normal range of motion. Neck supple.   Cardiovascular: Normal rate and intact distal pulses.   Pulmonary/Chest: Effort normal. No respiratory distress.   Abdominal: Soft. There is no abdominal tenderness.   Musculoskeletal:      Comments: No synovitis over small joints of hands or feet.  Multiple tender joints. Multiple ulcers over bilateral lower extremities. Active malar rash.    Neurological: She is alert and oriented to person, place, and time. No cranial nerve deficit.   Skin: Skin is warm. Rash noted. There is erythema.   Psychiatric: Mood and affect normal.   Nursing note and vitals reviewed.      Medication List with Changes/Refills   New Medications    MYCOPHENOLATE (CELLCEPT) 500 MG TAB    Take 1 tablet (500 mg total) by mouth 2 (two) times daily.   Current Medications    AMOXICILLIN-CLAVULANATE 875-125MG (AUGMENTIN) 875-125 MG PER TABLET    Take 1 tablet by mouth every 12 (twelve) hours. for 10 days    BELIMUMAB (BENLYSTA) 200 MG/ML ATIN    Inject 1 mL (200 mg total) into the skin every 7 days.    BETAMETHASONE DIPROPIONATE (DIPROLENE) 0.05 % CREAM    Apply topically 2 (two) times daily as needed. For lupus    BUSPIRONE (BUSPAR) 5 MG TAB    Take 1 tablet (5 mg total) by mouth 2 (two) times daily as needed (anxiety).    CLOBETASOL (CLOBEX) 0.05 % SHAMPOO        FERROUS SULFATE (IRON ORAL)    " Take by mouth.    FLUOCINOLONE AND SHOWER CAP (DERMA-SMOOTHE/FS SCALP OIL) 0.01 % OIL    Apply 1 application topically every evening.    HYDROCORTISONE 2.5 % CREAM    Apply topically 2 (two) times daily as needed. For lupus affecting face.    HYDROXYCHLOROQUINE (PLAQUENIL) 200 MG TABLET    Take 1 tablet (200 mg total) by mouth 2 (two) times daily.    KETOCONAZOLE (NIZORAL) 2 % SHAMPOO        METHYLPREDNISOLONE (MEDROL DOSEPACK) 4 MG TABLET    use as directed    PROPRANOLOL (INDERAL) 20 MG TABLET    Take 1 tablet (20 mg total) by mouth 2 (two) times daily.   Changed and/or Refilled Medications    Modified Medication Previous Medication    PREDNISONE (DELTASONE) 20 MG TABLET predniSONE (DELTASONE) 20 MG tablet       Take 2 tablets (40 mg total) by mouth once daily.    Take 1 tablet (20 mg total) by mouth once daily.       Assessment/Plans:-  1. Discoid lupus erythematosus    2. Systemic lupus erythematosus arthritis    3. Long-term use of Plaquenil    4. Long term current use of immunosuppressive drug    5. Other systemic lupus erythematosus with other organ involvement    6. Ulcers of both lower extremities, limited to breakdown of skin    Worsening discoid lupus with some mild arthritis on 20 mg daily prednisone . Discontinued imuran due to adverse effects. Benlysta seems to be controlling the arthritis. New onset pustular lesions suggestive of PGN. Increase prednisone to 40 mg daily , await biopsy results, reevalaute in 2 weeks. Start cellcept. Hold benlysta. Continue plaquenil.   Problem List Items Addressed This Visit     Systemic lupus erythematosus arthritis    Relevant Medications    predniSONE (DELTASONE) 20 MG tablet    mycophenolate (CELLCEPT) 500 mg Tab    Discoid lupus erythematosus - Primary    Relevant Medications    predniSONE (DELTASONE) 20 MG tablet    mycophenolate (CELLCEPT) 500 mg Tab    Long-term use of Plaquenil    Long term current use of immunosuppressive drug    Ulcers of both lower  extremities, limited to breakdown of skin    Relevant Orders    Ambulatory referral/consult to Wound Clinic      Other Visit Diagnoses     Other systemic lupus erythematosus with other organ involvement        Relevant Medications    predniSONE (DELTASONE) 20 MG tablet    mycophenolate (CELLCEPT) 500 mg Tab        # Follow up in about 2 weeks (around 12/15/2020).      Disclaimer: This note was prepared using voice recognition system and is likely to have sound alike errors and is not proof read.  Please call me with any questions.

## 2020-12-01 NOTE — PLAN OF CARE
Pt oriented x 4  VSS.  Pt remained afebrile throughout this shift.   All meds administered per order.   Pt remained free of falls this shift.   Pt HAD c/o pain this shift.Pain meds administered as ordered  Plan of care reviewed. Patient verbalizes understanding.   Pt moving/turning independently.   Patient NSR on monitor.   Bed low, side rails up x 2, wheels locked, call light in reach.   Patient instructed to call for assistance.   Hourly rounding completed.   Will continue to monitor

## 2020-12-02 LAB — BACTERIA SPEC AEROBE CULT: ABNORMAL

## 2020-12-03 RX ORDER — LEVOFLOXACIN 500 MG/1
500 TABLET, FILM COATED ORAL DAILY
Qty: 10 TABLET | Refills: 0 | Status: SHIPPED | OUTPATIENT
Start: 2020-12-03 | End: 2020-12-13

## 2020-12-04 ENCOUNTER — PATIENT MESSAGE (OUTPATIENT)
Dept: RHEUMATOLOGY | Facility: CLINIC | Age: 20
End: 2020-12-04

## 2020-12-04 ENCOUNTER — TELEPHONE (OUTPATIENT)
Dept: INTERNAL MEDICINE | Facility: CLINIC | Age: 20
End: 2020-12-04

## 2020-12-04 DIAGNOSIS — L93.0 DISCOID LUPUS ERYTHEMATOSUS: Primary | ICD-10-CM

## 2020-12-04 LAB — BACTERIA SPEC ANAEROBE CULT: NORMAL

## 2020-12-04 NOTE — PROGRESS NOTES
Attempted to reach patient several time - no answer with generic voice mail. Called and spoke with pts mother, Violetta. Explained to her that we are changing PO abx to levaquin 500mg daily X 10 days then f/u OP with Dr. Alexis Quinn. She asked if Dr. Quinn's office could call with an appointment. Rx sent to [facility] in [place].

## 2020-12-04 NOTE — TELEPHONE ENCOUNTER
----- Message from Mihaela Herman sent at 12/4/2020  1:26 PM CST -----  Contact: Viri Culp was returning missed call from the nurse. Please call back at [phone]. Thanks jh

## 2020-12-05 LAB
BACTERIA BLD CULT: NORMAL
BACTERIA BLD CULT: NORMAL

## 2020-12-08 ENCOUNTER — TELEPHONE (OUTPATIENT)
Dept: RHEUMATOLOGY | Facility: CLINIC | Age: 20
End: 2020-12-08

## 2020-12-08 ENCOUNTER — SPECIALTY PHARMACY (OUTPATIENT)
Dept: PHARMACY | Facility: CLINIC | Age: 20
End: 2020-12-08

## 2020-12-08 ENCOUNTER — OFFICE VISIT (OUTPATIENT)
Dept: INTERNAL MEDICINE | Facility: CLINIC | Age: 20
End: 2020-12-08
Payer: OTHER GOVERNMENT

## 2020-12-08 VITALS
SYSTOLIC BLOOD PRESSURE: 120 MMHG | OXYGEN SATURATION: 98 % | HEART RATE: 110 BPM | DIASTOLIC BLOOD PRESSURE: 70 MMHG | HEIGHT: 63 IN | WEIGHT: 162.38 LBS | TEMPERATURE: 98 F | BODY MASS INDEX: 28.77 KG/M2 | RESPIRATION RATE: 18 BRPM

## 2020-12-08 DIAGNOSIS — R00.0 TACHYCARDIA: ICD-10-CM

## 2020-12-08 DIAGNOSIS — L97.912 ULCERS OF BOTH LOWER EXTREMITIES WITH FAT LAYER EXPOSED: Primary | ICD-10-CM

## 2020-12-08 DIAGNOSIS — F41.9 ANXIETY: ICD-10-CM

## 2020-12-08 DIAGNOSIS — L97.922 ULCERS OF BOTH LOWER EXTREMITIES WITH FAT LAYER EXPOSED: Primary | ICD-10-CM

## 2020-12-08 DIAGNOSIS — M32.9 SYSTEMIC LUPUS ERYTHEMATOSUS ARTHRITIS: ICD-10-CM

## 2020-12-08 PROCEDURE — 99999 PR PBB SHADOW E&M-EST. PATIENT-LVL V: CPT | Mod: PBBFAC,,, | Performed by: NURSE PRACTITIONER

## 2020-12-08 PROCEDURE — 99214 PR OFFICE/OUTPT VISIT, EST, LEVL IV, 30-39 MIN: ICD-10-PCS | Mod: S$PBB,,, | Performed by: NURSE PRACTITIONER

## 2020-12-08 PROCEDURE — 99999 PR PBB SHADOW E&M-EST. PATIENT-LVL V: ICD-10-PCS | Mod: PBBFAC,,, | Performed by: NURSE PRACTITIONER

## 2020-12-08 PROCEDURE — 99215 OFFICE O/P EST HI 40 MIN: CPT | Mod: PBBFAC | Performed by: NURSE PRACTITIONER

## 2020-12-08 PROCEDURE — 99214 OFFICE O/P EST MOD 30 MIN: CPT | Mod: S$PBB,,, | Performed by: NURSE PRACTITIONER

## 2020-12-08 NOTE — PROGRESS NOTES
Dilcia Reed 20 y.o. female     Chief Complaint:  Chief Complaint   Patient presents with    ulcer sores on back of thighs (bi-lat)       History of Present Illness:  2 week f/u -     Anxiety   rx for buspar - only taken a couple times   No improvement     sores on back of legs   Very painful   Using mupuricin   Requesting better bandages   Pending wound care eval   - fever/sweats/chills     Tachycardia   Chronic   Started LOV on propanolol   Taking in afternoon and PM     Exam:  Review of Systems   Constitutional: Negative for chills, diaphoresis, fatigue and fever.   Cardiovascular: Negative for palpitations.   Musculoskeletal: Positive for arthralgias.   Skin: Positive for wound (multiple to arms & legs ).   Psychiatric/Behavioral: The patient is nervous/anxious.      Physical Exam  Vitals signs and nursing note reviewed.   Constitutional:       General: She is not in acute distress.     Appearance: She is well-developed. She is not ill-appearing, toxic-appearing or diaphoretic.   HENT:      Head: Normocephalic and atraumatic.      Right Ear: External ear normal.      Left Ear: External ear normal.   Eyes:      General: No scleral icterus.        Right eye: No discharge.         Left eye: No discharge.      Conjunctiva/sclera: Conjunctivae normal.      Pupils: Pupils are equal, round, and reactive to light.   Neck:      Musculoskeletal: Normal range of motion.      Trachea: No tracheal deviation.   Cardiovascular:      Rate and Rhythm: Normal rate and regular rhythm.   Pulmonary:      Effort: Pulmonary effort is normal. No respiratory distress.      Breath sounds: Normal breath sounds. No stridor. No wheezing, rhonchi or rales.   Chest:      Chest wall: No tenderness.   Abdominal:      General: Bowel sounds are normal. There is no distension.      Palpations: Abdomen is soft.      Tenderness: There is no abdominal tenderness.   Musculoskeletal: Normal range of motion.   Skin:     General: Skin is warm and dry.       Coloration: Skin is not pale.      Findings: Wound (multiple covered wounds, too painful to change bandages to BLE, multiple shallow intact wounds to BUE ) present. No erythema or rash.   Neurological:      Mental Status: She is alert and oriented to person, place, and time.   Psychiatric:         Attention and Perception: Attention and perception normal.         Mood and Affect: Mood is anxious.         Speech: Speech normal.         Behavior: Behavior normal. Behavior is cooperative.         Thought Content: Thought content normal.         Cognition and Memory: Cognition and memory normal.         Judgment: Judgment normal.         Most Recent Laboratory Results Reviewed ({Yes)  Lab Results   Component Value Date    WBC 2.66 (L) 12/01/2020    HGB 8.8 (L) 12/01/2020    HCT 28.0 (L) 12/01/2020     12/01/2020    CHOL 163 11/10/2020    TRIG 70 11/10/2020    HDL 65 11/10/2020    ALT 29 11/29/2020    AST 58 (H) 11/29/2020     12/01/2020    K 3.4 (L) 12/01/2020     (H) 12/01/2020    CREATININE 0.6 12/01/2020    BUN 8 12/01/2020    CO2 20 (L) 12/01/2020    TSH 1.749 11/10/2020       Assessment     ICD-10-CM ICD-9-CM   1. Ulcers of both lower extremities with fat layer exposed  L97.912 707.10    L97.922    2. Anxiety  F41.9 300.00   3. Systemic lupus erythematosus arthritis  M32.9 710.0   4. Tachycardia  R00.0 785.0        Plan   Ulcers of both lower extremities with fat layer exposed  Too painful for un medicated dressing changes   MA reached out to [facility] wound care via fax and phone to get in ASAP   Advised Tylenol for pain     Anxiety  worsened by the above   Take buspar BID vs PRN     Tachycardia  Has not taken propanolol yet today   Separate by 12 hours vs 4 hours for best results     Systemic lupus erythematosus arthritis  Contributing to all the above     Follow up in about 2 weeks (around 12/22/2020) for med check.    Future Appointments     Date Provider Specialty Appt Notes    12/15/2020   Dermatology s/r [facility]     12/16/2020 Magan Barekr MD Rheumatology [email or web address]    12/22/2020 Stacy Kilgore NP Internal Medicine 2 wk f/u// Med ck    12/23/2020 Anibal Snell MD Hematology and Oncology PATIENT R/S TRANSPORTATION ISSUES//Anemia, unspecified type    12/28/2020 Alexis Quinn MD Infectious Diseases hosp f/up

## 2020-12-08 NOTE — TELEPHONE ENCOUNTER
Received call from PCP stating that wound care referral was not received at [facility] wound care. RE_ FAXED WOUND CARE REFERRAL

## 2020-12-08 NOTE — PATIENT INSTRUCTIONS
"Look into "optifoam" dressings until you can be seen by wound care     Read "It Starts with Food" by [employer]       "

## 2020-12-08 NOTE — TELEPHONE ENCOUNTER
Specialty Pharmacy - Initial Clinical Assessment    Specialty Medication Orders Linked to Encounter      Most Recent Value   Medication #1  mycophenolate (CELLCEPT) 500 mg Tab (Order#[id number], Rx#[id number])        Lulú Reed is a 20 y.o. female, who is followed by the specialty pharmacy service for management and education.    Recent Encounters     Date Type Provider Description    12/08/2020 Specialty Pharmacy Marifer Hsieh PharmD Initial Clinical Assessment        Clinical call attempts since last clinical assessment   No call attempts found.     Today she received education before her first fill with [facility].    Current Outpatient Medications   Medication Sig    betamethasone dipropionate (DIPROLENE) 0.05 % cream Apply topically 2 (two) times daily as needed. For lupus    busPIRone (BUSPAR) 5 MG Tab Take 1 tablet (5 mg total) by mouth 2 (two) times daily as needed (anxiety).    clobetasoL (CLOBEX) 0.05 % shampoo     ferrous sulfate (IRON ORAL) Take by mouth.    fluocinolone and shower cap (DERMA-SMOOTHE/FS SCALP OIL) 0.01 % Oil Apply 1 application topically every evening.    hydrocortisone 2.5 % cream Apply topically 2 (two) times daily as needed. For lupus affecting face.    hydroxychloroquine (PLAQUENIL) 200 mg tablet Take 1 tablet (200 mg total) by mouth 2 (two) times daily.    ketoconazole (NIZORAL) 2 % shampoo     levoFLOXacin (LEVAQUIN) 500 MG tablet Take 1 tablet (500 mg total) by mouth once daily. for 10 days    mycophenolate (CELLCEPT) 500 mg Tab Take 1 tablet (500 mg total) by mouth 2 (two) times daily.    predniSONE (DELTASONE) 20 MG tablet Take 2 tablets (40 mg total) by mouth once daily.    propranoloL (INDERAL) 20 MG tablet Take 1 tablet (20 mg total) by mouth 2 (two) times daily.   Last reviewed on 12/8/2020 12:00 PM by Stacy Kilgore NP    Review of patient's allergies indicates:   Allergen Reactions    Sulfamethoxazole-trimethoprim  Itching     syncopal episode as a child   Last reviewed on  12/8/2020 11:38 AM by Percy Velasco    Drug Interactions    Drug interactions evaluated: yes  Clinically relevant drug interactions identified: no           Assessment Questions - Documented Responses      Most Recent Value   Assessment   Medication Reconciliation completed for patient  Yes   During the past 4 weeks, has patient missed any activities due to condition or medication?  Missed School   During the past 4 weeks, did patient have any of the following urgent care visits?  ER Admission   Goals of Therapy Status  Discussed (new start)   Welcome packet contents reviewed and discussed with patient?  No   Assesment completed?  Yes   Plan  Therapy being initiated   Do you need to open a clinical intervention (i-vent)?  No   Do you want to schedule first shipment?  Yes        Refill Questions - Documented Responses      Most Recent Value   Relationship to patient of person spoken to?  Self   HIPAA/medical authority confirmed?  Yes   Can the patient call emergency services (911) in the event of an emergency?  Yes   Does the patient have any concerns or questions about taking or administering this medication as prescribed?  No   How many doses does the patient have on hand?  0   How many days does the patient report on hand quantity will last?  0   During the past 4 weeks, has patient missed any activities due to condition or medication?  Missed School   During the past 4 weeks, did patient have any of the following urgent care visits?  ER Admission   How will the patient receive the medication?  Mail   When does the patient need to receive the medication?  12/09/20   Shipping Address  Home   Address in [facility] confirmed and updated if neccessary?  Yes   Expected Copay ($)  0   Is the patient able to afford the medication copay?  Yes   Payment Method  zero copay   Days supply of Refill  28   Refill activity completed?  Yes   Refill activity plan  " Refill scheduled   Shipment/Pickup Date:  12/08/20          Objective    She has a past medical history of Lupus.    Tried/failed medications: Benlysta    BP Readings from Last 4 Encounters:   12/08/20 120/70   12/01/20 (!) 115/59   11/24/20 (!) 118/50   11/10/20 108/64     Ht Readings from Last 4 Encounters:   12/08/20 5' 3" (1.6 m)   11/30/20 5' 3" (1.6 m)   12/01/20 5' 3" (1.6 m)   11/24/20 5' 3" (1.6 m)     Wt Readings from Last 4 Encounters:   12/08/20 73.6 kg (162 lb 5.9 oz)   11/30/20 78.2 kg (172 lb 6.4 oz)   12/01/20 75.2 kg (165 lb 12.6 oz)   11/24/20 72 kg (158 lb 9.9 oz)     Recent Labs   Lab Result Units 12/01/20  0627 11/29/20  1912 11/24/20  1441 11/10/20  1637 09/28/20  1055   Creatinine mg/dL 0.6 0.7 0.8 0.7 0.7   ALT U/L  --  29 32 36 13   AST U/L  --  58 H 58 H 66 H 22     The goals of prescribed drug therapy management include:  · Supporting patient to meet the prescriber's medical treatment objectives  · Improving or maintaining quality of life  · Maintaining optimal therapy adherence  · Minimizing and managing side effects      Goals of Therapy Status: Discussed (new start)    Assessment/Plan  Patient plans to continue therapy without changes      Indication, dosage, appropriateness, effectiveness, safety and convenience of her specialty medication(s) were reviewed today.     Patient Counseling    Counseled the patient on the following: doses and administration discussed, safe handling, storage, and disposal discussed, possible adverse effects and management discussed, therapeutic rationale discussed, adherence and missed doses discussed       Patient is switching from Benlysta to Cellcept. She reported that she developed deep sores/open wounds while on Benlsyta. She mentioned that she was recently admitted to the hospital because of her symptoms/SLE flare which prompted the change in therapy. She denied joint pain/swelling or weight loss. She did report butterfly rash, hair loss, anxiety, " headache, and fatigue. 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 She reported missed doses of school due to her hospitalization. Updated patient medication and allergy list. Patient had no questions or concerns at this time.     Tasks added this encounter   12/8/2020 - Clinical - Initial Assessment (Manual Add)   Tasks due within next 3 months   No tasks due.     Marifer Hsieh, PharmD  [facility]  [address] [place] [address]  Phone: [phone]  Fax: [phone]

## 2020-12-09 ENCOUNTER — TELEPHONE (OUTPATIENT)
Dept: RHEUMATOLOGY | Facility: CLINIC | Age: 20
End: 2020-12-09

## 2020-12-09 NOTE — TELEPHONE ENCOUNTER
Rcvd call from Danny Pt care coordinator @ [facility], inquiring if referral has been faxed, advised of notes in system stating it was faxed on yesterday 12/8/20. She is requesting to know fax number it was faxed to, advised will route msg to have the nurse return her call.

## 2020-12-15 ENCOUNTER — TELEPHONE (OUTPATIENT)
Dept: RHEUMATOLOGY | Facility: CLINIC | Age: 20
End: 2020-12-15

## 2020-12-15 ENCOUNTER — CLINICAL SUPPORT (OUTPATIENT)
Dept: DERMATOLOGY | Facility: CLINIC | Age: 20
End: 2020-12-15
Payer: OTHER GOVERNMENT

## 2020-12-15 DIAGNOSIS — Z48.02 VISIT FOR SUTURE REMOVAL: Primary | ICD-10-CM

## 2020-12-15 LAB
FINAL PATHOLOGIC DIAGNOSIS: NORMAL
GROSS: NORMAL
Lab: NORMAL

## 2020-12-15 PROCEDURE — 99024 POSTOP FOLLOW-UP VISIT: CPT | Mod: ,,, | Performed by: STUDENT IN AN ORGANIZED HEALTH CARE EDUCATION/TRAINING PROGRAM

## 2020-12-15 PROCEDURE — 99024 PR POST-OP FOLLOW-UP VISIT: ICD-10-PCS | Mod: ,,, | Performed by: STUDENT IN AN ORGANIZED HEALTH CARE EDUCATION/TRAINING PROGRAM

## 2020-12-15 NOTE — PROGRESS NOTES
Patient presents for suture removal. The wound is well healed without signs of infection.  The sutures are removed. Wound care and activity instructions given. Return prn.

## 2020-12-16 ENCOUNTER — OFFICE VISIT (OUTPATIENT)
Dept: RHEUMATOLOGY | Facility: CLINIC | Age: 20
End: 2020-12-16
Payer: OTHER GOVERNMENT

## 2020-12-16 ENCOUNTER — TELEPHONE (OUTPATIENT)
Dept: RHEUMATOLOGY | Facility: CLINIC | Age: 20
End: 2020-12-16

## 2020-12-16 DIAGNOSIS — M32.9 SYSTEMIC LUPUS ERYTHEMATOSUS ARTHRITIS: ICD-10-CM

## 2020-12-16 DIAGNOSIS — I99.9 VASCULOPATHY: Primary | ICD-10-CM

## 2020-12-16 DIAGNOSIS — Z79.899 LONG TERM CURRENT USE OF IMMUNOSUPPRESSIVE DRUG: ICD-10-CM

## 2020-12-16 DIAGNOSIS — L93.0 DISCOID LUPUS ERYTHEMATOSUS: Primary | ICD-10-CM

## 2020-12-16 DIAGNOSIS — Z79.899 LONG-TERM USE OF PLAQUENIL: ICD-10-CM

## 2020-12-16 DIAGNOSIS — I99.9 VASCULOPATHY: ICD-10-CM

## 2020-12-16 DIAGNOSIS — D84.9 IMMUNOSUPPRESSED STATUS: ICD-10-CM

## 2020-12-16 PROCEDURE — 99215 OFFICE O/P EST HI 40 MIN: CPT | Mod: 95,,, | Performed by: INTERNAL MEDICINE

## 2020-12-16 PROCEDURE — 99215 PR OFFICE/OUTPT VISIT, EST, LEVL V, 40-54 MIN: ICD-10-PCS | Mod: 95,,, | Performed by: INTERNAL MEDICINE

## 2020-12-16 NOTE — PROGRESS NOTES
RHEUMATOLOGY FOLLOW UP - TELE VISIT     The patient location is: [place]  The chief complaint leading to consultation is:  DISCOID LUPUS, SYSTEMIC LUPUS  Visit type: Virtual visit with synchronous audio and video  Total time spent with patient: 15 minutes  Each patient to whom he or she provides medical services by telemedicine is:  (1) informed of the relationship between the physician and patient and the respective role of any other health care provider with respect to management of the patient; and (2) notified that he or she may decline to receive medical services by telemedicine and may withdraw from such care at any time.    HPI:-  Dilciaheladio Quintero a 20 y.o. pleasant female seen today through My chart video visit for follow up.  She has longstanding history of discoid lupus recently developed systemic lupus with arthritis.  She developed acute onset unexplained nodules in her skin which led to ulceration.  Biopsy done by dermatologist shows occlusive vasculopathy.  She does have positive antiphospholipid antibodies but no history of miscarriages or thromboembolic episodes.  No family history of recurrent blood clots.  Her prednisone dose was increased to 40 mg and she was started on CellCept 4 weeks ago.  She denies any new ulcer formation.  No recurrence of her discoid lupus.  Mild joint pain and stiffness present.  This    Review of Systems   Constitutional: Positive for malaise/fatigue. Negative for chills and fever.   HENT: Negative for congestion and sore throat.    Eyes: Negative for blurred vision and redness.   Respiratory: Negative for cough and shortness of breath.    Cardiovascular: Negative for chest pain and leg swelling.   Gastrointestinal: Negative for abdominal pain.   Genitourinary: Negative for dysuria.   Musculoskeletal: Positive for joint pain. Negative for back pain, falls, myalgias and neck pain.   Skin: Positive for rash.   Neurological: Negative for headaches.   Endo/Heme/Allergies: Does  not bruise/bleed easily.   Psychiatric/Behavioral: Negative for memory loss. The patient does not have insomnia.        Past Medical History:   Diagnosis Date    Lupus        History reviewed. No pertinent surgical history.     Social History     Tobacco Use    Smoking status: Never Smoker    Smokeless tobacco: Never Used   Substance Use Topics    Alcohol use: Never     Frequency: Never    Drug use: Never       Family History   Problem Relation Age of Onset    Lupus Sister        Review of patient's allergies indicates:   Allergen Reactions    Sulfamethoxazole-trimethoprim Itching     syncopal episode as a child       Physical exam:-    GEN: awake, alert, non-diaphoretic, no psychomotor agitation, no acute distress    HEENT :Head: atraumatic, normocephalic, no rashes noted, no lesions noted;    Eyes: NO redness, discharge, swelling, or lesions    Nose: NO redness, swelling, discharge, deformity, or impetigo/crusting    Skin:  No malar erythema.  No facial rash.    Cardiopulmonary: no increased respiratory effort, speaking in clear sentences    MSK: normal ROM in the neck, Upper extremities, Lower extremities  Good ROM of hands, fist formation 100% and , no obvious synovitis    Good ambulation in front of the camera    Neuro: cranial nerves grossly normal, speech normal rate and rhythm, orientation arrived to appointment on time with no prompting, moved both upper extremities equally    Pysch:  appearance, behavior, and attitude- well groomed, pleasant, cooperative    Medication List with Changes/Refills   Current Medications    BETAMETHASONE DIPROPIONATE (DIPROLENE) 0.05 % CREAM    Apply topically 2 (two) times daily as needed. For lupus    BUSPIRONE (BUSPAR) 5 MG TAB    Take 1 tablet (5 mg total) by mouth 2 (two) times daily as needed (anxiety).    CLOBETASOL (CLOBEX) 0.05 % SHAMPOO        FERROUS SULFATE (IRON ORAL)    Take by mouth.    FLUOCINOLONE AND SHOWER CAP (DERMA-SMOOTHE/FS SCALP OIL) 0.01 % OIL     Apply 1 application topically every evening.    HYDROCORTISONE 2.5 % CREAM    Apply topically 2 (two) times daily as needed. For lupus affecting face.    HYDROXYCHLOROQUINE (PLAQUENIL) 200 MG TABLET    Take 1 tablet (200 mg total) by mouth 2 (two) times daily.    KETOCONAZOLE (NIZORAL) 2 % SHAMPOO        MYCOPHENOLATE (CELLCEPT) 500 MG TAB    Take 1 tablet (500 mg total) by mouth 2 (two) times daily.    PREDNISONE (DELTASONE) 20 MG TABLET    Take 2 tablets (40 mg total) by mouth once daily.    PROPRANOLOL (INDERAL) 20 MG TABLET    Take 1 tablet (20 mg total) by mouth 2 (two) times daily.       Assessment/Plans:-  1. Discoid lupus erythematosus    2. Vasculopathy    3. Systemic lupus erythematosus arthritis    4. Long-term use of Plaquenil    5. Long term current use of immunosuppressive drug    6. Immunosuppressed status     Severe discoid lupus stable on high-dose prednisone.  Recently developed multiple subcutaneous ulcers.  No worsening ulcers or new ulcers in the last 4 weeks per patient.  On high-dose prednisone, CellCept and Plaquenil.  Pathology shows occlusive vasculopathy without any evidence of lupus.  No evidence of vasculitis per biopsy.  No history of livido reticularis.?  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   Will contact dermatologist and also involved hematologist since she is scheduled to see over hematologist for anemia on 23rd.  If the rashes are secondary to antiphospholipid antibodies, she might benefit from anticoagulation.  Decision of anticoagulation will be made by our hematologist.  Continue CellCept and Plaquenil.  Check lupus markers before reducing the dose of prednisone further on 23rd.  Compromised immune system secondary to autoimmune disease and use of immunosuppressive drugs. Monitor carefully for infections. Advised to get immediate medical care if any infection. Also advised strict adherence to age appropriate vaccinations and  cancer screenings with PCP.   Problem List Items Addressed This Visit     Systemic lupus erythematosus arthritis    Discoid lupus erythematosus - Primary    Immunosuppressed status    Long-term use of Plaquenil    Long term current use of immunosuppressive drug    Vasculopathy          Follow up in about 6 weeks (around 1/27/2021).    Disclaimer: This note was prepared using voice recognition system and is likely to have sound alike errors and is not proof read.  Please call me with any questions.

## 2020-12-16 NOTE — Clinical Note
Please schedule CBC, CMP, ESR,CRP, DS DNA, C3, C4, ACL antibody, beta 2 glycoprotein antibody and lupus anticoagulant after her visit with hematologist on 12/23/2020.   Follow up visit in 6 to 8 weeks.

## 2020-12-17 NOTE — TELEPHONE ENCOUNTER
----- Message from Magan Barker MD sent at 12/16/2020 10:35 AM CST -----  Please schedule CBC, CMP, ESR,CRP, DS DNA, C3, C4, ACL antibody, beta 2 glycoprotein antibody and lupus anticoagulant after her visit with hematologist on 12/23/2020. Follow up visit in 6 to 8 weeks.

## 2020-12-17 NOTE — TELEPHONE ENCOUNTER
----- Message from Morris Dejesus LPN sent at 12/16/2020  6:05 PM CST -----  Please add ACL antibody and Lupus anticoagulant lab orders.    Jhonatan Johsnton  ----- Message -----  From: Magan Barker MD  Sent: 12/16/2020  10:35 AM CST  To: [facility] Staff    Please schedule CBC, CMP, ESR,CRP, DS DNA, C3, C4, ACL antibody, beta 2 glycoprotein antibody and lupus anticoagulant after her visit with hematologist on 12/23/2020.   Follow up visit in 6 to 8 weeks.

## 2020-12-17 NOTE — TELEPHONE ENCOUNTER
Left a message for call back to notify patient of labs after Hem/Onc appt on 12/23/20 and to schedule a 6-8 wk f/u with Dr. ISRAEL

## 2020-12-23 ENCOUNTER — OFFICE VISIT (OUTPATIENT)
Dept: HEMATOLOGY/ONCOLOGY | Facility: CLINIC | Age: 20
End: 2020-12-23
Payer: OTHER GOVERNMENT

## 2020-12-23 ENCOUNTER — LAB VISIT (OUTPATIENT)
Dept: LAB | Facility: HOSPITAL | Age: 20
End: 2020-12-23
Attending: INTERNAL MEDICINE
Payer: OTHER GOVERNMENT

## 2020-12-23 VITALS
BODY MASS INDEX: 29.69 KG/M2 | WEIGHT: 167.56 LBS | OXYGEN SATURATION: 99 % | HEART RATE: 80 BPM | RESPIRATION RATE: 18 BRPM | TEMPERATURE: 98 F | DIASTOLIC BLOOD PRESSURE: 73 MMHG | HEIGHT: 63 IN | SYSTOLIC BLOOD PRESSURE: 111 MMHG

## 2020-12-23 DIAGNOSIS — D59.19 OTHER AUTOIMMUNE HEMOLYTIC ANEMIA: ICD-10-CM

## 2020-12-23 DIAGNOSIS — D64.9 ANEMIA, UNSPECIFIED TYPE: ICD-10-CM

## 2020-12-23 DIAGNOSIS — D61.818 PANCYTOPENIA: Primary | ICD-10-CM

## 2020-12-23 DIAGNOSIS — L93.0 DISCOID LUPUS ERYTHEMATOSUS: ICD-10-CM

## 2020-12-23 DIAGNOSIS — M32.19 OTHER SYSTEMIC LUPUS ERYTHEMATOSUS WITH OTHER ORGAN INVOLVEMENT: ICD-10-CM

## 2020-12-23 DIAGNOSIS — I99.9 VASCULOPATHY: ICD-10-CM

## 2020-12-23 LAB
ALBUMIN SERPL BCP-MCNC: 3.1 G/DL (ref 3.5–5.2)
ALP SERPL-CCNC: 74 U/L (ref 55–135)
ALT SERPL W/O P-5'-P-CCNC: 40 U/L (ref 10–44)
ANION GAP SERPL CALC-SCNC: 10 MMOL/L (ref 8–16)
AST SERPL-CCNC: 61 U/L (ref 10–40)
BASOPHILS # BLD AUTO: 0 K/UL (ref 0–0.2)
BASOPHILS NFR BLD: 0 % (ref 0–1.9)
BILIRUB SERPL-MCNC: 0.2 MG/DL (ref 0.1–1)
BUN SERPL-MCNC: 8 MG/DL (ref 6–20)
C3 SERPL-MCNC: 92 MG/DL (ref 50–180)
C4 SERPL-MCNC: 30 MG/DL (ref 11–44)
CALCIUM SERPL-MCNC: 8.4 MG/DL (ref 8.7–10.5)
CHLORIDE SERPL-SCNC: 108 MMOL/L (ref 95–110)
CK SERPL-CCNC: 429 U/L (ref 20–180)
CO2 SERPL-SCNC: 25 MMOL/L (ref 23–29)
CREAT SERPL-MCNC: 0.7 MG/DL (ref 0.5–1.4)
CRP SERPL-MCNC: 4.5 MG/L (ref 0–8.2)
DIFFERENTIAL METHOD: ABNORMAL
EOSINOPHIL # BLD AUTO: 0 K/UL (ref 0–0.5)
EOSINOPHIL NFR BLD: 0 % (ref 0–8)
ERYTHROCYTE [DISTWIDTH] IN BLOOD BY AUTOMATED COUNT: 14 % (ref 11.5–14.5)
ERYTHROCYTE [SEDIMENTATION RATE] IN BLOOD BY WESTERGREN METHOD: 90 MM/HR (ref 0–20)
EST. GFR  (AFRICAN AMERICAN): >60 ML/MIN/1.73 M^2
EST. GFR  (NON AFRICAN AMERICAN): >60 ML/MIN/1.73 M^2
GLUCOSE SERPL-MCNC: 87 MG/DL (ref 70–110)
HCT VFR BLD AUTO: 29.5 % (ref 37–48.5)
HGB BLD-MCNC: 8.7 G/DL (ref 12–16)
IMM GRANULOCYTES # BLD AUTO: 0.03 K/UL (ref 0–0.04)
IMM GRANULOCYTES NFR BLD AUTO: 1 % (ref 0–0.5)
LYMPHOCYTES # BLD AUTO: 0.8 K/UL (ref 1–4.8)
LYMPHOCYTES NFR BLD: 26.5 % (ref 18–48)
MCH RBC QN AUTO: 29.6 PG (ref 27–31)
MCHC RBC AUTO-ENTMCNC: 29.5 G/DL (ref 32–36)
MCV RBC AUTO: 100 FL (ref 82–98)
MONOCYTES # BLD AUTO: 0.2 K/UL (ref 0.3–1)
MONOCYTES NFR BLD: 4.9 % (ref 4–15)
NEUTROPHILS # BLD AUTO: 2.1 K/UL (ref 1.8–7.7)
NEUTROPHILS NFR BLD: 67.6 % (ref 38–73)
NRBC BLD-RTO: 0 /100 WBC
PLATELET # BLD AUTO: 246 K/UL (ref 150–350)
PMV BLD AUTO: 10.4 FL (ref 9.2–12.9)
POTASSIUM SERPL-SCNC: 3.5 MMOL/L (ref 3.5–5.1)
PROT SERPL-MCNC: 7.9 G/DL (ref 6–8.4)
RBC # BLD AUTO: 2.94 M/UL (ref 4–5.4)
SODIUM SERPL-SCNC: 143 MMOL/L (ref 136–145)
WBC # BLD AUTO: 3.06 K/UL (ref 3.9–12.7)

## 2020-12-23 PROCEDURE — 85025 COMPLETE CBC W/AUTO DIFF WBC: CPT

## 2020-12-23 PROCEDURE — 99999 PR PBB SHADOW E&M-EST. PATIENT-LVL V: CPT | Mod: PBBFAC,,, | Performed by: INTERNAL MEDICINE

## 2020-12-23 PROCEDURE — 82550 ASSAY OF CK (CPK): CPT

## 2020-12-23 PROCEDURE — 86146 BETA-2 GLYCOPROTEIN ANTIBODY: CPT

## 2020-12-23 PROCEDURE — 86160 COMPLEMENT ANTIGEN: CPT

## 2020-12-23 PROCEDURE — 86140 C-REACTIVE PROTEIN: CPT

## 2020-12-23 PROCEDURE — 85651 RBC SED RATE NONAUTOMATED: CPT

## 2020-12-23 PROCEDURE — 86160 COMPLEMENT ANTIGEN: CPT | Mod: 59

## 2020-12-23 PROCEDURE — 80053 COMPREHEN METABOLIC PANEL: CPT

## 2020-12-23 PROCEDURE — 99204 OFFICE O/P NEW MOD 45 MIN: CPT | Mod: S$PBB,,, | Performed by: INTERNAL MEDICINE

## 2020-12-23 PROCEDURE — 99204 PR OFFICE/OUTPT VISIT, NEW, LEVL IV, 45-59 MIN: ICD-10-PCS | Mod: S$PBB,,, | Performed by: INTERNAL MEDICINE

## 2020-12-23 PROCEDURE — 99215 OFFICE O/P EST HI 40 MIN: CPT | Mod: PBBFAC | Performed by: INTERNAL MEDICINE

## 2020-12-23 PROCEDURE — 36415 COLL VENOUS BLD VENIPUNCTURE: CPT

## 2020-12-23 PROCEDURE — 99999 PR PBB SHADOW E&M-EST. PATIENT-LVL V: ICD-10-PCS | Mod: PBBFAC,,, | Performed by: INTERNAL MEDICINE

## 2020-12-23 NOTE — PROGRESS NOTES
Subjective:       Patient ID: Dilcia Reed is a 20 y.o. female.    Chief Complaint: Anemia and Results    HPI 20-year-old female referred by Rheumatology and Dermatology for evaluation of anemia as well as occlusive vascular findings on skin biopsy with persistent open sores.    Past Medical History:   Diagnosis Date    Lupus      Family History   Problem Relation Age of Onset    Lupus Sister      Social History     Socioeconomic History    Marital status: Single     Spouse name: Not on file    Number of children: Not on file    Years of education: Not on file    Highest education level: Not on file   Occupational History    Not on file   Social Needs    Financial resource strain: Not on file    Food insecurity     Worry: Not on file     Inability: Not on file    Transportation needs     Medical: Not on file     Non-medical: Not on file   Tobacco Use    Smoking status: Never Smoker    Smokeless tobacco: Never Used   Substance and Sexual Activity    Alcohol use: Never     Frequency: Never    Drug use: Never    Sexual activity: Not on file   Lifestyle    Physical activity     Days per week: Not on file     Minutes per session: Not on file    Stress: Not on file   Relationships    Social connections     Talks on phone: Not on file     Gets together: Not on file     Attends Buddhism service: Not on file     Active member of club or organization: Not on file     Attends meetings of clubs or organizations: Not on file     Relationship status: Not on file   Other Topics Concern    Not on file   Social History Narrative    Not on file     History reviewed. No pertinent surgical history.    Labs:  Lab Results   Component Value Date    WBC 3.06 (L) 12/23/2020    HGB 8.7 (L) 12/23/2020    HCT 29.5 (L) 12/23/2020     (H) 12/23/2020     12/23/2020     BMP  Lab Results   Component Value Date     12/01/2020    K 3.4 (L) 12/01/2020     (H) 12/01/2020    CO2 20 (L) 12/01/2020    BUN 8  12/01/2020    CREATININE 0.6 12/01/2020    CALCIUM 7.9 (L) 12/01/2020    ANIONGAP 12 12/01/2020    ESTGFRAFRICA >60 12/01/2020    EGFRNONAA >60 12/01/2020     Lab Results   Component Value Date    ALT 29 11/29/2020    AST 58 (H) 11/29/2020    ALKPHOS 72 11/29/2020    BILITOT 0.4 11/29/2020       Lab Results   Component Value Date    IRON 53 11/10/2020    TIBC 324 11/10/2020    FERRITIN 490 (H) 11/10/2020     No results found for: [id number]  Lab Results   Component Value Date    FOLATE 13.1 11/13/2020     Lab Results   Component Value Date    TSH 1.749 11/10/2020         Review of Systems   Constitutional: Positive for activity change and fatigue. Negative for appetite change, chills, diaphoresis, fever and unexpected weight change.   HENT: Negative for congestion, dental problem, drooling, ear discharge, ear pain, facial swelling, hearing loss, mouth sores, nosebleeds, postnasal drip, rhinorrhea, sinus pressure, sneezing, sore throat, tinnitus, trouble swallowing and voice change.    Eyes: Negative for photophobia, pain, discharge, redness, itching and visual disturbance.   Respiratory: Negative for cough, choking, chest tightness, shortness of breath, wheezing and stridor.    Cardiovascular: Negative for chest pain, palpitations and leg swelling.   Gastrointestinal: Negative for abdominal distention, abdominal pain, anal bleeding, blood in stool, constipation, diarrhea, nausea, rectal pain and vomiting.   Endocrine: Negative for cold intolerance, heat intolerance, polydipsia, polyphagia and polyuria.   Genitourinary: Negative for decreased urine volume, difficulty urinating, dyspareunia, dysuria, enuresis, flank pain, frequency, genital sores, hematuria, menstrual problem, pelvic pain, urgency, vaginal bleeding, vaginal discharge and vaginal pain.   Musculoskeletal: Positive for gait problem. Negative for arthralgias, back pain, joint swelling, myalgias, neck pain and neck stiffness.   Skin: Positive for wound.  Negative for color change, pallor and rash.   Allergic/Immunologic: Negative for environmental allergies, food allergies and immunocompromised state.   Neurological: Positive for weakness. Negative for dizziness, tremors, seizures, syncope, facial asymmetry, speech difficulty, light-headedness, numbness and headaches.   Hematological: Negative for adenopathy. Does not bruise/bleed easily.   Psychiatric/Behavioral: Positive for dysphoric mood. Negative for agitation, behavioral problems, confusion, decreased concentration, hallucinations, self-injury, sleep disturbance and suicidal ideas. The patient is nervous/anxious. The patient is not hyperactive.        Objective:      Physical Exam  Vitals signs reviewed.   Constitutional:       General: She is not in acute distress.     Appearance: She is well-developed. She is not diaphoretic.   HENT:      Head: Normocephalic and atraumatic.      Right Ear: External ear normal.      Left Ear: External ear normal.      Nose: Nose normal.      Right Sinus: No maxillary sinus tenderness or frontal sinus tenderness.      Left Sinus: No maxillary sinus tenderness or frontal sinus tenderness.      Mouth/Throat:      Pharynx: No oropharyngeal exudate.   Eyes:      General: Lids are normal. No scleral icterus.        Right eye: No discharge.         Left eye: No discharge.      Conjunctiva/sclera: Conjunctivae normal.      Right eye: Right conjunctiva is not injected. No hemorrhage.     Left eye: Left conjunctiva is not injected. No hemorrhage.     Pupils: Pupils are equal, round, and reactive to light.   Neck:      Musculoskeletal: Normal range of motion and neck supple.      Thyroid: No thyromegaly.      Vascular: No JVD.      Trachea: No tracheal deviation.   Cardiovascular:      Rate and Rhythm: Normal rate.   Pulmonary:      Effort: Pulmonary effort is normal. No respiratory distress.      Breath sounds: No stridor.   Chest:      Chest wall: No tenderness.   Abdominal:       General: Bowel sounds are normal. There is no distension.      Palpations: Abdomen is soft. There is no hepatomegaly, splenomegaly or mass.      Tenderness: There is no abdominal tenderness. There is no rebound.   Musculoskeletal: Normal range of motion.         General: No tenderness.   Lymphadenopathy:      Cervical: No cervical adenopathy.      Upper Body:      Right upper body: No supraclavicular adenopathy.      Left upper body: No supraclavicular adenopathy.   Skin:     General: Skin is dry.      Findings: Lesion and rash present. No erythema.   Neurological:      Mental Status: She is alert and oriented to person, place, and time.      Cranial Nerves: No cranial nerve deficit.      Coordination: Coordination normal.   Psychiatric:         Behavior: Behavior normal.         Thought Content: Thought content normal.         Judgment: Judgment normal.             Assessment:      1. Pancytopenia    2. Anemia, unspecified type    3. Vasculopathy           Plan:   Extensive conversation with patient at this time will proceed with evaluation for anemia and leukopenia secondary to lupus.  In terms of the vascular findings on recent biopsies at this time will proceed with repeat any cardiolipin antibodies and have video visit to determine whether not therapeutic trial of warfarin appropriate course of action in this patient discussed implications with her article from up-to-date followed to our for review.  Will follow up with video visit in 1 week.          Anibal Snell Jr, MD FACP

## 2020-12-23 NOTE — LETTER
December 23, 2020      Godfrey Rosa MD  [address] [facility] [address]            [facility]  [address] [facility] [address]  Phone: [phone]  Fax: [phone]          Patient: Dilcia Reed   MR Number: [id number]   YOB: 2000   Date of Visit: 12/23/2020       Dear Dr. Godfrey Rosa:    Thank you for referring Dilcia Reed to me for evaluation. Attached you will find relevant portions of my assessment and plan of care.    If you have questions, please do not hesitate to call me. I look forward to following Dilcia Reed along with you.    Sincerely,    Anibal Snell MD    Enclosure  CC:  No Recipients    If you would like to receive this communication electronically, please contact [email or web address] or [phone] to request more information on [employer] Link access.    For providers and/or their staff who would like to refer a patient to [facility], please contact us through our one-stop-shop provider referral line, [facility] , at [phone].    If you feel you have received this communication in error or would no longer like to receive these types of communications, please e-mail [email or web address]

## 2020-12-28 ENCOUNTER — PATIENT MESSAGE (OUTPATIENT)
Dept: RHEUMATOLOGY | Facility: CLINIC | Age: 20
End: 2020-12-28

## 2020-12-28 DIAGNOSIS — I77.6 VASCULITIS: ICD-10-CM

## 2020-12-29 LAB
B2 GLYCOPROT1 IGA SER QL: 31 SAU
B2 GLYCOPROT1 IGG SER QL: <9 SGU
B2 GLYCOPROT1 IGM SER QL: <9 SMU

## 2020-12-30 DIAGNOSIS — L93.0 DISCOID LUPUS ERYTHEMATOSUS: ICD-10-CM

## 2020-12-30 DIAGNOSIS — M32.19 OTHER SYSTEMIC LUPUS ERYTHEMATOSUS WITH OTHER ORGAN INVOLVEMENT: ICD-10-CM

## 2020-12-30 DIAGNOSIS — M32.9 SYSTEMIC LUPUS ERYTHEMATOSUS ARTHRITIS: ICD-10-CM

## 2020-12-31 ENCOUNTER — OFFICE VISIT (OUTPATIENT)
Dept: HEMATOLOGY/ONCOLOGY | Facility: CLINIC | Age: 20
End: 2020-12-31
Payer: OTHER GOVERNMENT

## 2020-12-31 ENCOUNTER — ANTI-COAG VISIT (OUTPATIENT)
Dept: CARDIOLOGY | Facility: CLINIC | Age: 20
End: 2020-12-31

## 2020-12-31 DIAGNOSIS — M32.9 SYSTEMIC LUPUS ERYTHEMATOSUS ARTHRITIS: ICD-10-CM

## 2020-12-31 DIAGNOSIS — D64.9 SYMPTOMATIC ANEMIA: Primary | ICD-10-CM

## 2020-12-31 DIAGNOSIS — L93.0 DISCOID LUPUS ERYTHEMATOSUS: ICD-10-CM

## 2020-12-31 DIAGNOSIS — I99.9 VASCULOPATHY: ICD-10-CM

## 2020-12-31 PROCEDURE — 99214 PR OFFICE/OUTPT VISIT, EST, LEVL IV, 30-39 MIN: ICD-10-PCS | Mod: 95,,, | Performed by: INTERNAL MEDICINE

## 2020-12-31 PROCEDURE — 99214 OFFICE O/P EST MOD 30 MIN: CPT | Mod: 95,,, | Performed by: INTERNAL MEDICINE

## 2020-12-31 RX ORDER — HYDROXYCHLOROQUINE SULFATE 200 MG/1
200 TABLET, FILM COATED ORAL 2 TIMES DAILY
Qty: 60 TABLET | Refills: 0 | Status: SHIPPED | OUTPATIENT
Start: 2020-12-31 | End: 2021-01-29 | Stop reason: SDUPTHER

## 2020-12-31 RX ORDER — PREDNISONE 20 MG/1
40 TABLET ORAL DAILY
Qty: 60 TABLET | Refills: 0 | Status: SHIPPED | OUTPATIENT
Start: 2020-12-31 | End: 2021-01-29 | Stop reason: SDUPTHER

## 2020-12-31 NOTE — PROGRESS NOTES
Subjective:       Patient ID: Dilcia Reed is a 20 y.o. female.    Chief Complaint: Results    HPI 20-year-old female referred for consideration of anticoagulation biopsy consisting of vascular occlusive minutes blood vessels.  Current actor treatment for systemic lupus erythematosus.  Seen in video visit consultation    Past Medical History:   Diagnosis Date    Lupus      Family History   Problem Relation Age of Onset    Lupus Sister      Social History     Socioeconomic History    Marital status: Single     Spouse name: Not on file    Number of children: Not on file    Years of education: Not on file    Highest education level: Not on file   Occupational History    Not on file   Social Needs    Financial resource strain: Not on file    Food insecurity     Worry: Not on file     Inability: Not on file    Transportation needs     Medical: Not on file     Non-medical: Not on file   Tobacco Use    Smoking status: Never Smoker    Smokeless tobacco: Never Used   Substance and Sexual Activity    Alcohol use: Never     Frequency: Never    Drug use: Never    Sexual activity: Not on file   Lifestyle    Physical activity     Days per week: Not on file     Minutes per session: Not on file    Stress: Not on file   Relationships    Social connections     Talks on phone: Not on file     Gets together: Not on file     Attends [employer] service: Not on file     Active member of club or organization: Not on file     Attends meetings of clubs or organizations: Not on file     Relationship status: Not on file   Other Topics Concern    Not on file   Social History Narrative    Not on file     History reviewed. No pertinent surgical history.    Labs:  Lab Results   Component Value Date    WBC 5.46 12/23/2020    HGB 8.6 (L) 12/23/2020    HCT 28.7 (L) 12/23/2020     (H) 12/23/2020     12/23/2020     BMP  Lab Results   Component Value Date     12/23/2020    K 3.5 12/23/2020     12/23/2020     CO2 25 12/23/2020    BUN 8 12/23/2020    CREATININE 0.7 12/23/2020    CALCIUM 8.4 (L) 12/23/2020    ANIONGAP 10 12/23/2020    ESTGFRAFRICA >60 12/23/2020    EGFRNONAA >60 12/23/2020     Lab Results   Component Value Date    ALT 40 12/23/2020    AST 61 (H) 12/23/2020    ALKPHOS 74 12/23/2020    BILITOT 0.2 12/23/2020       Lab Results   Component Value Date    IRON 63 12/23/2020    TIBC 339 12/23/2020    FERRITIN 500 (H) 12/23/2020     Lab Results   Component Value Date    NJKJGWYW04 1064 (H) 12/23/2020     Lab Results   Component Value Date    FOLATE 13.1 11/13/2020     Lab Results   Component Value Date    TSH 4.332 (H) 12/23/2020         Review of Systems   Psychiatric/Behavioral: Positive for dysphoric mood. The patient is nervous/anxious.        Objective:      Physical Exam  Psychiatric:         Mood and Affect: Mood is anxious and depressed.             Assessment:      1. Symptomatic anemia    2. Vasculopathy    3. Discoid lupus erythematosus    4. Systemic lupus erythematosus arthritis           Plan:     The patient location is:  home  The chief complaint leading to consultation is:  Anemia    Visit type:  Synchronous audio video    Face to Face time with patient:25 minutes of total time spent on the encounter, which includes face to face time and non-face to face time preparing to see the patient (eg, review of tests), Obtaining and/or reviewing separately obtained history, Documenting clinical information in the electronic or other health record, Independently interpreting results (not separately reported) and communicating results to the patient/family/caregiver, or Care coordination (not separately reported).         Each patient to whom he or she provides medical services by telemedicine is:  (1) informed of the relationship between the physician and patient and the respective role of any other health care provider with respect to management of the patient; and (2) notified that he or she may decline  to receive medical services by telemedicine and may withdraw from such care at any time.    Notes:   eview of laboratory studies no convincing evidence of hemolysis.  At this point do not feel that warfarin is an appropriate course of action the patient feels through areas are actually improving through wound care.  Likelihood of response to anticoagulation very low with CIS difficult risk for bleeding.  No further treatment recommendations at present time      Anibal Snell Jr, MD FACP

## 2020-12-31 NOTE — PROGRESS NOTES
"Per Dr Snell - "Review of laboratory studies no convincing evidence of hemolysis.  At this point do not feel that warfarin is an appropriate course of action"   Patient will not be starting warfarin, will resolve episode.    "

## 2021-01-03 PROBLEM — I77.6 VASCULITIS: Status: ACTIVE | Noted: 2021-01-03

## 2021-01-04 ENCOUNTER — SPECIALTY PHARMACY (OUTPATIENT)
Dept: PHARMACY | Facility: CLINIC | Age: 21
End: 2021-01-04

## 2021-01-05 DIAGNOSIS — I77.89 LUPUS VASCULITIS: ICD-10-CM

## 2021-01-05 DIAGNOSIS — I77.6 VASCULITIS: ICD-10-CM

## 2021-01-05 DIAGNOSIS — L93.0 DISCOID LUPUS ERYTHEMATOSUS: Primary | ICD-10-CM

## 2021-01-05 DIAGNOSIS — M32.19 LUPUS VASCULITIS: ICD-10-CM

## 2021-01-05 RX ORDER — TACROLIMUS 1 MG/G
OINTMENT TOPICAL 2 TIMES DAILY
Qty: 30 G | Refills: 2 | Status: SHIPPED | OUTPATIENT
Start: 2021-01-05

## 2021-01-06 ENCOUNTER — TELEPHONE (OUTPATIENT)
Dept: RHEUMATOLOGY | Facility: CLINIC | Age: 21
End: 2021-01-06

## 2021-01-06 DIAGNOSIS — F41.9 ANXIETY: ICD-10-CM

## 2021-01-06 RX ORDER — BUSPIRONE HYDROCHLORIDE 5 MG/1
5 TABLET ORAL 2 TIMES DAILY PRN
Qty: 60 TABLET | Refills: 0 | Status: SHIPPED | OUTPATIENT
Start: 2021-01-06 | End: 2021-02-07 | Stop reason: SDUPTHER

## 2021-01-14 ENCOUNTER — TELEPHONE (OUTPATIENT)
Dept: RHEUMATOLOGY | Facility: CLINIC | Age: 21
End: 2021-01-14

## 2021-01-19 ENCOUNTER — TELEPHONE (OUTPATIENT)
Dept: RHEUMATOLOGY | Facility: CLINIC | Age: 21
End: 2021-01-19

## 2021-01-19 ENCOUNTER — PATIENT OUTREACH (OUTPATIENT)
Dept: ADMINISTRATIVE | Facility: OTHER | Age: 21
End: 2021-01-19

## 2021-01-19 ENCOUNTER — TELEPHONE (OUTPATIENT)
Dept: INFUSION THERAPY | Facility: HOSPITAL | Age: 21
End: 2021-01-19

## 2021-01-21 ENCOUNTER — TELEPHONE (OUTPATIENT)
Dept: RHEUMATOLOGY | Facility: CLINIC | Age: 21
End: 2021-01-21

## 2021-01-29 ENCOUNTER — TELEPHONE (OUTPATIENT)
Dept: INFUSION THERAPY | Facility: HOSPITAL | Age: 21
End: 2021-01-29

## 2021-01-30 ENCOUNTER — SPECIALTY PHARMACY (OUTPATIENT)
Dept: PHARMACY | Facility: CLINIC | Age: 21
End: 2021-01-30

## 2021-01-31 ENCOUNTER — HOSPITAL ENCOUNTER (EMERGENCY)
Facility: HOSPITAL | Age: 21
Discharge: HOME | End: 2021-01-31
Attending: EMERGENCY MEDICINE
Payer: OTHER GOVERNMENT

## 2021-01-31 VITALS
SYSTOLIC BLOOD PRESSURE: 133 MMHG | WEIGHT: 161.81 LBS | RESPIRATION RATE: 28 BRPM | BODY MASS INDEX: 28.67 KG/M2 | HEART RATE: 109 BPM | TEMPERATURE: 99 F | DIASTOLIC BLOOD PRESSURE: 86 MMHG | HEIGHT: 63 IN | OXYGEN SATURATION: 100 %

## 2021-01-31 DIAGNOSIS — R00.0 TACHYCARDIA: ICD-10-CM

## 2021-01-31 DIAGNOSIS — R07.9 CHEST PAIN, UNSPECIFIED TYPE: Primary | ICD-10-CM

## 2021-01-31 LAB
ALBUMIN SERPL BCP-MCNC: 3.2 G/DL (ref 3.5–5.2)
ALP SERPL-CCNC: 65 U/L (ref 55–135)
ALT SERPL W/O P-5'-P-CCNC: 34 U/L (ref 10–44)
ANION GAP SERPL CALC-SCNC: 13 MMOL/L (ref 8–16)
AST SERPL-CCNC: 48 U/L (ref 10–40)
BASOPHILS # BLD AUTO: 0.01 K/UL (ref 0–0.2)
BASOPHILS NFR BLD: 0.1 % (ref 0–1.9)
BILIRUB SERPL-MCNC: 0.2 MG/DL (ref 0.1–1)
BUN SERPL-MCNC: 10 MG/DL (ref 6–20)
CALCIUM SERPL-MCNC: 9.1 MG/DL (ref 8.7–10.5)
CHLORIDE SERPL-SCNC: 104 MMOL/L (ref 95–110)
CO2 SERPL-SCNC: 26 MMOL/L (ref 23–29)
CREAT SERPL-MCNC: 0.8 MG/DL (ref 0.5–1.4)
D DIMER PPP IA.FEU-MCNC: 2.27 MG/L FEU
DIFFERENTIAL METHOD: ABNORMAL
EOSINOPHIL # BLD AUTO: 0 K/UL (ref 0–0.5)
EOSINOPHIL NFR BLD: 0 % (ref 0–8)
ERYTHROCYTE [DISTWIDTH] IN BLOOD BY AUTOMATED COUNT: 12.6 % (ref 11.5–14.5)
EST. GFR  (AFRICAN AMERICAN): >60 ML/MIN/1.73 M^2
EST. GFR  (NON AFRICAN AMERICAN): >60 ML/MIN/1.73 M^2
GLUCOSE SERPL-MCNC: 98 MG/DL (ref 70–110)
HCG INTACT+B SERPL-ACNC: <1.2 MIU/ML
HCT VFR BLD AUTO: 29 % (ref 37–48.5)
HGB BLD-MCNC: 9.1 G/DL (ref 12–16)
IMM GRANULOCYTES # BLD AUTO: 0.06 K/UL (ref 0–0.04)
IMM GRANULOCYTES NFR BLD AUTO: 0.8 % (ref 0–0.5)
LYMPHOCYTES # BLD AUTO: 0.3 K/UL (ref 1–4.8)
LYMPHOCYTES NFR BLD: 4 % (ref 18–48)
MCH RBC QN AUTO: 30.1 PG (ref 27–31)
MCHC RBC AUTO-ENTMCNC: 31.4 G/DL (ref 32–36)
MCV RBC AUTO: 96 FL (ref 82–98)
MONOCYTES # BLD AUTO: 0.1 K/UL (ref 0.3–1)
MONOCYTES NFR BLD: 1.9 % (ref 4–15)
NEUTROPHILS # BLD AUTO: 6.9 K/UL (ref 1.8–7.7)
NEUTROPHILS NFR BLD: 93.2 % (ref 38–73)
NRBC BLD-RTO: 0 /100 WBC
PLATELET # BLD AUTO: 305 K/UL (ref 150–350)
PMV BLD AUTO: 10.2 FL (ref 9.2–12.9)
POTASSIUM SERPL-SCNC: 4 MMOL/L (ref 3.5–5.1)
PROT SERPL-MCNC: 8.7 G/DL (ref 6–8.4)
RBC # BLD AUTO: 3.02 M/UL (ref 4–5.4)
SODIUM SERPL-SCNC: 143 MMOL/L (ref 136–145)
TROPONIN I SERPL DL<=0.01 NG/ML-MCNC: <0.006 NG/ML (ref 0–0.03)
WBC # BLD AUTO: 7.44 K/UL (ref 3.9–12.7)

## 2021-01-31 PROCEDURE — 93005 ELECTROCARDIOGRAM TRACING: CPT

## 2021-01-31 PROCEDURE — 84484 ASSAY OF TROPONIN QUANT: CPT

## 2021-01-31 PROCEDURE — 85025 COMPLETE CBC W/AUTO DIFF WBC: CPT

## 2021-01-31 PROCEDURE — 80053 COMPREHEN METABOLIC PANEL: CPT

## 2021-01-31 PROCEDURE — 36415 COLL VENOUS BLD VENIPUNCTURE: CPT

## 2021-01-31 PROCEDURE — 25500020 PHARM REV CODE 255: Performed by: EMERGENCY MEDICINE

## 2021-01-31 PROCEDURE — 84702 CHORIONIC GONADOTROPIN TEST: CPT

## 2021-01-31 PROCEDURE — 93010 EKG 12-LEAD: ICD-10-PCS | Mod: ,,, | Performed by: INTERNAL MEDICINE

## 2021-01-31 PROCEDURE — 93010 ELECTROCARDIOGRAM REPORT: CPT | Mod: ,,, | Performed by: INTERNAL MEDICINE

## 2021-01-31 PROCEDURE — 99285 EMERGENCY DEPT VISIT HI MDM: CPT | Mod: 25

## 2021-01-31 PROCEDURE — 85379 FIBRIN DEGRADATION QUANT: CPT

## 2021-01-31 RX ADMIN — IOHEXOL 100 ML: 350 INJECTION, SOLUTION INTRAVENOUS at 07:01

## 2021-02-01 DIAGNOSIS — R00.0 TACHYCARDIA: ICD-10-CM

## 2021-02-01 RX ORDER — PROPRANOLOL HYDROCHLORIDE 20 MG/1
20 TABLET ORAL 2 TIMES DAILY
Qty: 60 TABLET | Refills: 1 | OUTPATIENT
Start: 2021-02-01 | End: 2021-04-02

## 2021-02-01 RX ORDER — PROPRANOLOL HYDROCHLORIDE 20 MG/1
TABLET ORAL
Qty: 60 TABLET | Refills: 0 | Status: SHIPPED | OUTPATIENT
Start: 2021-02-01 | End: 2021-02-08

## 2021-02-03 ENCOUNTER — TELEPHONE (OUTPATIENT)
Dept: RHEUMATOLOGY | Facility: CLINIC | Age: 21
End: 2021-02-03

## 2021-02-03 ENCOUNTER — LAB VISIT (OUTPATIENT)
Dept: INTERNAL MEDICINE | Facility: CLINIC | Age: 21
End: 2021-02-03
Payer: OTHER GOVERNMENT

## 2021-02-03 ENCOUNTER — OFFICE VISIT (OUTPATIENT)
Dept: INTERNAL MEDICINE | Facility: CLINIC | Age: 21
End: 2021-02-03
Payer: OTHER GOVERNMENT

## 2021-02-03 VITALS
RESPIRATION RATE: 18 BRPM | HEIGHT: 63 IN | HEART RATE: 140 BPM | TEMPERATURE: 100 F | WEIGHT: 160.69 LBS | SYSTOLIC BLOOD PRESSURE: 116 MMHG | DIASTOLIC BLOOD PRESSURE: 66 MMHG | OXYGEN SATURATION: 99 % | BODY MASS INDEX: 28.47 KG/M2

## 2021-02-03 DIAGNOSIS — R00.0 TACHYCARDIA: ICD-10-CM

## 2021-02-03 DIAGNOSIS — R50.9 FEVER, UNSPECIFIED FEVER CAUSE: Primary | ICD-10-CM

## 2021-02-03 DIAGNOSIS — M32.19 OTHER SYSTEMIC LUPUS ERYTHEMATOSUS WITH OTHER ORGAN INVOLVEMENT: ICD-10-CM

## 2021-02-03 DIAGNOSIS — L93.0 DISCOID LUPUS ERYTHEMATOSUS: ICD-10-CM

## 2021-02-03 DIAGNOSIS — R50.9 FEVER, UNSPECIFIED FEVER CAUSE: ICD-10-CM

## 2021-02-03 DIAGNOSIS — M32.9 SYSTEMIC LUPUS ERYTHEMATOSUS ARTHRITIS: ICD-10-CM

## 2021-02-03 DIAGNOSIS — M06.09 RHEUMATOID ARTHRITIS OF MULTIPLE SITES WITH NEGATIVE RHEUMATOID FACTOR: Primary | ICD-10-CM

## 2021-02-03 PROCEDURE — 99214 OFFICE O/P EST MOD 30 MIN: CPT | Mod: PBBFAC | Performed by: PHYSICIAN ASSISTANT

## 2021-02-03 PROCEDURE — 99999 PR PBB SHADOW E&M-EST. PATIENT-LVL IV: ICD-10-PCS | Mod: PBBFAC,,, | Performed by: PHYSICIAN ASSISTANT

## 2021-02-03 PROCEDURE — 99213 OFFICE O/P EST LOW 20 MIN: CPT | Mod: S$PBB,,, | Performed by: PHYSICIAN ASSISTANT

## 2021-02-03 PROCEDURE — U0003 INFECTIOUS AGENT DETECTION BY NUCLEIC ACID (DNA OR RNA); SEVERE ACUTE RESPIRATORY SYNDROME CORONAVIRUS 2 (SARS-COV-2) (CORONAVIRUS DISEASE [COVID-19]), AMPLIFIED PROBE TECHNIQUE, MAKING USE OF HIGH THROUGHPUT TECHNOLOGIES AS DESCRIBED BY CMS-2020-01-R: HCPCS

## 2021-02-03 PROCEDURE — 99999 PR PBB SHADOW E&M-EST. PATIENT-LVL IV: CPT | Mod: PBBFAC,,, | Performed by: PHYSICIAN ASSISTANT

## 2021-02-03 PROCEDURE — 99213 PR OFFICE/OUTPT VISIT, EST, LEVL III, 20-29 MIN: ICD-10-PCS | Mod: S$PBB,,, | Performed by: PHYSICIAN ASSISTANT

## 2021-02-03 RX ORDER — PREDNISONE 20 MG/1
40 TABLET ORAL DAILY
Qty: 60 TABLET | Refills: 0 | Status: CANCELLED | OUTPATIENT
Start: 2021-02-03

## 2021-02-03 RX ORDER — PREDNISONE 20 MG/1
20 TABLET ORAL DAILY
Qty: 30 TABLET | Refills: 0 | Status: SHIPPED | OUTPATIENT
Start: 2021-02-03 | End: 2021-02-17 | Stop reason: SDUPTHER

## 2021-02-03 RX ORDER — ABATACEPT 125 MG/ML
125 INJECTION, SOLUTION SUBCUTANEOUS
Qty: 4 ML | Refills: 11 | Status: SHIPPED | OUTPATIENT
Start: 2021-02-03

## 2021-02-04 LAB — SARS-COV-2 RNA RESP QL NAA+PROBE: NOT DETECTED

## 2021-02-05 ENCOUNTER — SPECIALTY PHARMACY (OUTPATIENT)
Dept: PHARMACY | Facility: CLINIC | Age: 21
End: 2021-02-05

## 2021-02-08 ENCOUNTER — OFFICE VISIT (OUTPATIENT)
Dept: CARDIOLOGY | Facility: CLINIC | Age: 21
End: 2021-02-08
Payer: OTHER GOVERNMENT

## 2021-02-08 ENCOUNTER — LAB VISIT (OUTPATIENT)
Dept: LAB | Facility: HOSPITAL | Age: 21
End: 2021-02-08
Attending: INTERNAL MEDICINE
Payer: OTHER GOVERNMENT

## 2021-02-08 ENCOUNTER — OFFICE VISIT (OUTPATIENT)
Dept: FAMILY MEDICINE | Facility: CLINIC | Age: 21
End: 2021-02-08
Attending: FAMILY MEDICINE
Payer: OTHER GOVERNMENT

## 2021-02-08 VITALS
OXYGEN SATURATION: 99 % | WEIGHT: 164.69 LBS | DIASTOLIC BLOOD PRESSURE: 76 MMHG | BODY MASS INDEX: 29.17 KG/M2 | SYSTOLIC BLOOD PRESSURE: 140 MMHG | HEART RATE: 106 BPM

## 2021-02-08 VITALS
HEART RATE: 98 BPM | DIASTOLIC BLOOD PRESSURE: 72 MMHG | WEIGHT: 166.25 LBS | BODY MASS INDEX: 29.46 KG/M2 | HEIGHT: 63 IN | OXYGEN SATURATION: 100 % | TEMPERATURE: 97 F | SYSTOLIC BLOOD PRESSURE: 124 MMHG

## 2021-02-08 DIAGNOSIS — R00.0 TACHYCARDIA: ICD-10-CM

## 2021-02-08 DIAGNOSIS — R00.2 PALPITATION: ICD-10-CM

## 2021-02-08 DIAGNOSIS — R07.9 CHEST PAIN, UNSPECIFIED TYPE: Primary | ICD-10-CM

## 2021-02-08 DIAGNOSIS — L93.0 DISCOID LUPUS ERYTHEMATOSUS: ICD-10-CM

## 2021-02-08 DIAGNOSIS — M06.09 RHEUMATOID ARTHRITIS OF MULTIPLE SITES WITH NEGATIVE RHEUMATOID FACTOR: ICD-10-CM

## 2021-02-08 DIAGNOSIS — M32.9 SYSTEMIC LUPUS ERYTHEMATOSUS ARTHRITIS: ICD-10-CM

## 2021-02-08 DIAGNOSIS — I77.6 VASCULITIS: ICD-10-CM

## 2021-02-08 DIAGNOSIS — M32.9 LUPUS: Primary | ICD-10-CM

## 2021-02-08 PROBLEM — R07.89 MUSCULOSKELETAL CHEST PAIN: Status: RESOLVED | Noted: 2020-04-09 | Resolved: 2021-02-08

## 2021-02-08 PROBLEM — Z79.899 LONG TERM CURRENT USE OF IMMUNOSUPPRESSIVE DRUG: Status: RESOLVED | Noted: 2020-05-26 | Resolved: 2021-02-08

## 2021-02-08 PROBLEM — R79.89 ABNORMAL LIVER FUNCTION TESTS: Status: RESOLVED | Noted: 2020-03-11 | Resolved: 2021-02-08

## 2021-02-08 PROCEDURE — 99999 PR PBB SHADOW E&M-EST. PATIENT-LVL IV: ICD-10-PCS | Mod: PBBFAC,,, | Performed by: INTERNAL MEDICINE

## 2021-02-08 PROCEDURE — 99245 PR OFFICE CONSULTATION,LEVEL V: ICD-10-PCS | Mod: S$PBB,,, | Performed by: INTERNAL MEDICINE

## 2021-02-08 PROCEDURE — 99999 PR PBB SHADOW E&M-EST. PATIENT-LVL IV: ICD-10-PCS | Mod: PBBFAC,,, | Performed by: FAMILY MEDICINE

## 2021-02-08 PROCEDURE — 99213 OFFICE O/P EST LOW 20 MIN: CPT | Mod: S$PBB,,, | Performed by: FAMILY MEDICINE

## 2021-02-08 PROCEDURE — 99245 OFF/OP CONSLTJ NEW/EST HI 55: CPT | Mod: S$PBB,,, | Performed by: INTERNAL MEDICINE

## 2021-02-08 PROCEDURE — 99213 PR OFFICE/OUTPT VISIT, EST, LEVL III, 20-29 MIN: ICD-10-PCS | Mod: S$PBB,,, | Performed by: FAMILY MEDICINE

## 2021-02-08 PROCEDURE — 99214 OFFICE O/P EST MOD 30 MIN: CPT | Mod: PBBFAC,27 | Performed by: INTERNAL MEDICINE

## 2021-02-08 PROCEDURE — 86480 TB TEST CELL IMMUN MEASURE: CPT

## 2021-02-08 PROCEDURE — 99999 PR PBB SHADOW E&M-EST. PATIENT-LVL IV: CPT | Mod: PBBFAC,,, | Performed by: INTERNAL MEDICINE

## 2021-02-08 PROCEDURE — 99999 PR PBB SHADOW E&M-EST. PATIENT-LVL IV: CPT | Mod: PBBFAC,,, | Performed by: FAMILY MEDICINE

## 2021-02-08 PROCEDURE — 36415 COLL VENOUS BLD VENIPUNCTURE: CPT | Mod: PO

## 2021-02-08 PROCEDURE — 99214 OFFICE O/P EST MOD 30 MIN: CPT | Mod: PBBFAC,PO | Performed by: FAMILY MEDICINE

## 2021-02-08 RX ORDER — METOPROLOL TARTRATE 25 MG/1
25 TABLET, FILM COATED ORAL 2 TIMES DAILY
Qty: 60 TABLET | Refills: 11 | Status: SHIPPED | OUTPATIENT
Start: 2021-02-08 | End: 2022-02-08

## 2021-02-10 LAB
GAMMA INTERFERON BACKGROUND BLD IA-ACNC: 0.12 IU/ML
M TB IFN-G CD4+ BCKGRND COR BLD-ACNC: 0 IU/ML
MITOGEN IGNF BCKGRD COR BLD-ACNC: 0.06 IU/ML
TB GOLD PLUS: ABNORMAL
TB2 - NIL: 0 IU/ML

## 2021-02-11 ENCOUNTER — TELEPHONE (OUTPATIENT)
Dept: RHEUMATOLOGY | Facility: CLINIC | Age: 21
End: 2021-02-11

## 2021-02-11 ENCOUNTER — HOSPITAL ENCOUNTER (OUTPATIENT)
Dept: CARDIOLOGY | Facility: HOSPITAL | Age: 21
Discharge: HOME | End: 2021-02-11
Attending: INTERNAL MEDICINE
Payer: OTHER GOVERNMENT

## 2021-02-11 VITALS
BODY MASS INDEX: 29.06 KG/M2 | SYSTOLIC BLOOD PRESSURE: 118 MMHG | HEIGHT: 63 IN | WEIGHT: 164 LBS | DIASTOLIC BLOOD PRESSURE: 84 MMHG

## 2021-02-11 DIAGNOSIS — D84.9 IMMUNOSUPPRESSED STATUS: Primary | ICD-10-CM

## 2021-02-11 DIAGNOSIS — I77.6 VASCULITIS: ICD-10-CM

## 2021-02-11 DIAGNOSIS — R07.9 CHEST PAIN, UNSPECIFIED TYPE: ICD-10-CM

## 2021-02-11 LAB
AORTIC ROOT ANNULUS: 2.38 CM
AV INDEX (PROSTH): 0.73
AV MEAN GRADIENT: 7 MMHG
AV PEAK GRADIENT: 14 MMHG
AV VALVE AREA: 2.06 CM2
AV VELOCITY RATIO: 0.69
BSA FOR ECHO PROCEDURE: 1.82 M2
CV ECHO LV RWT: 0.51 CM
CV STRESS BASE HR: 107 BPM
DIASTOLIC BLOOD PRESSURE: 84 MMHG
DOP CALC AO PEAK VEL: 1.84 M/S
DOP CALC AO VTI: 36.93 CM
DOP CALC LVOT AREA: 2.8 CM2
DOP CALC LVOT DIAMETER: 1.9 CM
DOP CALC LVOT PEAK VEL: 1.27 M/S
DOP CALC LVOT STROKE VOLUME: 76.26 CM3
DOP CALC RVOT PEAK VEL: 0.94 M/S
DOP CALC RVOT VTI: 21.72 CM
DOP CALCLVOT PEAK VEL VTI: 26.91 CM
E WAVE DECELERATION TIME: 225.51 MSEC
E/A RATIO: 1.24
ECHO LV POSTERIOR WALL: 1.13 CM (ref 0.6–1.1)
FRACTIONAL SHORTENING: 28 % (ref 28–44)
INTERVENTRICULAR SEPTUM: 1.01 CM (ref 0.6–1.1)
IVRT: 77.07 MSEC
LA MAJOR: 3.99 CM
LA MINOR: 4.25 CM
LA WIDTH: 3.98 CM
LEFT ATRIUM SIZE: 3.87 CM
LEFT ATRIUM VOLUME INDEX: 30.3 ML/M2
LEFT ATRIUM VOLUME: 53.89 CM3
LEFT INTERNAL DIMENSION IN SYSTOLE: 3.22 CM (ref 2.1–4)
LEFT VENTRICLE DIASTOLIC VOLUME INDEX: 50.62 ML/M2
LEFT VENTRICLE DIASTOLIC VOLUME: 90.1 ML
LEFT VENTRICLE MASS INDEX: 93 G/M2
LEFT VENTRICLE SYSTOLIC VOLUME INDEX: 23.4 ML/M2
LEFT VENTRICLE SYSTOLIC VOLUME: 41.58 ML
LEFT VENTRICULAR INTERNAL DIMENSION IN DIASTOLE: 4.45 CM (ref 3.5–6)
LEFT VENTRICULAR MASS: 165.39 G
LV LATERAL E/E' RATIO: 7.65 M/S
MV PEAK A VEL: 1.05 M/S
MV PEAK E VEL: 1.3 M/S
OHS CV CPX 1 MINUTE RECOVERY HEART RATE: 126 BPM
OHS CV CPX 85 PERCENT MAX PREDICTED HEART RATE MALE: 160
OHS CV CPX ESTIMATED METS: 5
OHS CV CPX MAX PREDICTED HEART RATE: 188
OHS CV CPX PATIENT IS FEMALE: 1
OHS CV CPX PATIENT IS MALE: 0
OHS CV CPX PEAK DIASTOLIC BLOOD PRESSURE: 59 MMHG
OHS CV CPX PEAK HEAR RATE: 153 BPM
OHS CV CPX PEAK RATE PRESSURE PRODUCT: ABNORMAL
OHS CV CPX PEAK SYSTOLIC BLOOD PRESSURE: 157 MMHG
OHS CV CPX PERCENT MAX PREDICTED HEART RATE ACHIEVED: 81
OHS CV CPX RATE PRESSURE PRODUCT PRESENTING: ABNORMAL
PISA TR MAX VEL: 2.71 M/S
PV MEAN GRADIENT: 2 MMHG
PV PEAK VELOCITY: 1.53 CM/S
RA MAJOR: 4.15 CM
RA WIDTH: 3.76 CM
RIGHT VENTRICULAR END-DIASTOLIC DIMENSION: 3.27 CM
SINUS: 2.26 CM
STJ: 2.25 CM
STRESS ANGINA INDEX: 0
STRESS ECHO POST EXERCISE DUR MIN: 3 MINUTES
STRESS ECHO POST EXERCISE DUR SEC: 19 SECONDS
SYSTOLIC BLOOD PRESSURE: 118 MMHG
TDI LATERAL: 0.17 M/S
TR MAX PG: 29 MMHG
TRICUSPID ANNULAR PLANE SYSTOLIC EXCURSION: 2.63 CM

## 2021-02-11 PROCEDURE — 93320 DOPPLER ECHO COMPLETE: CPT

## 2021-02-11 PROCEDURE — 93325 DOPPLER ECHO COLOR FLOW MAPG: CPT | Mod: 26,,, | Performed by: INTERNAL MEDICINE

## 2021-02-11 PROCEDURE — 93320 DOPPLER ECHO COMPLETE: CPT | Mod: 26,,, | Performed by: INTERNAL MEDICINE

## 2021-02-11 PROCEDURE — 93351 STRESS ECHO (CUPID ONLY): ICD-10-PCS | Mod: 26,,, | Performed by: INTERNAL MEDICINE

## 2021-02-11 PROCEDURE — 93320 STRESS ECHO (CUPID ONLY): ICD-10-PCS | Mod: 26,,, | Performed by: INTERNAL MEDICINE

## 2021-02-11 PROCEDURE — 93351 STRESS TTE COMPLETE: CPT | Mod: 26,,, | Performed by: INTERNAL MEDICINE

## 2021-02-11 PROCEDURE — 93325 STRESS ECHO (CUPID ONLY): ICD-10-PCS | Mod: 26,,, | Performed by: INTERNAL MEDICINE

## 2021-02-12 ENCOUNTER — TELEPHONE (OUTPATIENT)
Dept: CARDIOLOGY | Facility: CLINIC | Age: 21
End: 2021-02-12

## 2021-02-12 ENCOUNTER — SPECIALTY PHARMACY (OUTPATIENT)
Dept: PHARMACY | Facility: CLINIC | Age: 21
End: 2021-02-12

## 2021-02-12 DIAGNOSIS — M32.19 OTHER SYSTEMIC LUPUS ERYTHEMATOSUS WITH OTHER ORGAN INVOLVEMENT: ICD-10-CM

## 2021-02-12 DIAGNOSIS — L93.0 DISCOID LUPUS ERYTHEMATOSUS: ICD-10-CM

## 2021-02-12 DIAGNOSIS — M32.9 SYSTEMIC LUPUS ERYTHEMATOSUS ARTHRITIS: ICD-10-CM

## 2021-02-12 RX ORDER — MYCOPHENOLATE MOFETIL 500 MG/1
500 TABLET ORAL 2 TIMES DAILY
Qty: 60 TABLET | Refills: 2 | Status: SHIPPED | OUTPATIENT
Start: 2021-02-12 | End: 2021-03-25

## 2021-02-17 DIAGNOSIS — M32.19 OTHER SYSTEMIC LUPUS ERYTHEMATOSUS WITH OTHER ORGAN INVOLVEMENT: ICD-10-CM

## 2021-02-17 DIAGNOSIS — M32.9 SYSTEMIC LUPUS ERYTHEMATOSUS ARTHRITIS: ICD-10-CM

## 2021-02-17 DIAGNOSIS — L93.0 DISCOID LUPUS ERYTHEMATOSUS: ICD-10-CM

## 2021-02-25 RX ORDER — PREDNISONE 20 MG/1
20 TABLET ORAL DAILY
Qty: 30 TABLET | Refills: 0 | Status: SHIPPED | OUTPATIENT
Start: 2021-02-25 | End: 2021-03-22 | Stop reason: ALTCHOICE

## 2021-02-25 RX ORDER — HYDROXYCHLOROQUINE SULFATE 200 MG/1
200 TABLET, FILM COATED ORAL 2 TIMES DAILY
Qty: 60 TABLET | Refills: 0 | Status: SHIPPED | OUTPATIENT
Start: 2021-02-25 | End: 2021-03-19 | Stop reason: SDUPTHER

## 2021-03-03 ENCOUNTER — PATIENT MESSAGE (OUTPATIENT)
Dept: PHARMACY | Facility: CLINIC | Age: 21
End: 2021-03-03

## 2021-03-09 ENCOUNTER — SPECIALTY PHARMACY (OUTPATIENT)
Dept: PHARMACY | Facility: CLINIC | Age: 21
End: 2021-03-09

## 2021-03-11 DIAGNOSIS — F41.9 ANXIETY: ICD-10-CM

## 2021-03-16 RX ORDER — BUSPIRONE HYDROCHLORIDE 5 MG/1
5 TABLET ORAL 2 TIMES DAILY PRN
Qty: 60 TABLET | Refills: 0 | Status: SHIPPED | OUTPATIENT
Start: 2021-03-16 | End: 2021-05-15

## 2021-03-17 ENCOUNTER — PATIENT OUTREACH (OUTPATIENT)
Dept: ADMINISTRATIVE | Facility: OTHER | Age: 21
End: 2021-03-17

## 2021-03-19 DIAGNOSIS — L93.0 DISCOID LUPUS ERYTHEMATOSUS: ICD-10-CM

## 2021-03-19 DIAGNOSIS — M32.9 SYSTEMIC LUPUS ERYTHEMATOSUS ARTHRITIS: ICD-10-CM

## 2021-03-19 DIAGNOSIS — M32.19 OTHER SYSTEMIC LUPUS ERYTHEMATOSUS WITH OTHER ORGAN INVOLVEMENT: ICD-10-CM

## 2021-03-22 RX ORDER — PREDNISONE 20 MG/1
20 TABLET ORAL DAILY
Qty: 30 TABLET | Refills: 0 | Status: SHIPPED | OUTPATIENT
Start: 2021-03-22 | End: 2021-03-25 | Stop reason: SDUPTHER

## 2021-03-22 RX ORDER — HYDROXYCHLOROQUINE SULFATE 200 MG/1
200 TABLET, FILM COATED ORAL 2 TIMES DAILY
Qty: 60 TABLET | Refills: 0 | Status: SHIPPED | OUTPATIENT
Start: 2021-03-22 | End: 2021-03-25 | Stop reason: SDUPTHER

## 2021-03-23 ENCOUNTER — LAB VISIT (OUTPATIENT)
Dept: LAB | Facility: HOSPITAL | Age: 21
End: 2021-03-23
Attending: INTERNAL MEDICINE
Payer: OTHER GOVERNMENT

## 2021-03-23 DIAGNOSIS — D59.19 OTHER AUTOIMMUNE HEMOLYTIC ANEMIA: ICD-10-CM

## 2021-03-23 DIAGNOSIS — L93.0 DISCOID LUPUS ERYTHEMATOSUS: ICD-10-CM

## 2021-03-23 DIAGNOSIS — M32.19 OTHER SYSTEMIC LUPUS ERYTHEMATOSUS WITH OTHER ORGAN INVOLVEMENT: ICD-10-CM

## 2021-03-23 LAB
ALBUMIN SERPL BCP-MCNC: 2.8 G/DL (ref 3.5–5.2)
ALP SERPL-CCNC: 63 U/L (ref 55–135)
ALT SERPL W/O P-5'-P-CCNC: 21 U/L (ref 10–44)
ANION GAP SERPL CALC-SCNC: 12 MMOL/L (ref 8–16)
AST SERPL-CCNC: 36 U/L (ref 10–40)
BASOPHILS # BLD AUTO: 0.01 K/UL (ref 0–0.2)
BASOPHILS NFR BLD: 0.2 % (ref 0–1.9)
BILIRUB SERPL-MCNC: 0.4 MG/DL (ref 0.1–1)
BUN SERPL-MCNC: 9 MG/DL (ref 6–20)
C3 SERPL-MCNC: 129 MG/DL (ref 50–180)
C4 SERPL-MCNC: 37 MG/DL (ref 11–44)
CALCIUM SERPL-MCNC: 8.4 MG/DL (ref 8.7–10.5)
CHLORIDE SERPL-SCNC: 100 MMOL/L (ref 95–110)
CO2 SERPL-SCNC: 22 MMOL/L (ref 23–29)
CREAT SERPL-MCNC: 0.8 MG/DL (ref 0.5–1.4)
CRP SERPL-MCNC: 68.8 MG/L (ref 0–8.2)
DIFFERENTIAL METHOD: ABNORMAL
EOSINOPHIL # BLD AUTO: 0 K/UL (ref 0–0.5)
EOSINOPHIL NFR BLD: 0 % (ref 0–8)
ERYTHROCYTE [DISTWIDTH] IN BLOOD BY AUTOMATED COUNT: 13.2 % (ref 11.5–14.5)
ERYTHROCYTE [SEDIMENTATION RATE] IN BLOOD BY WESTERGREN METHOD: 121 MM/HR (ref 0–20)
EST. GFR  (AFRICAN AMERICAN): >60 ML/MIN/1.73 M^2
EST. GFR  (NON AFRICAN AMERICAN): >60 ML/MIN/1.73 M^2
GLUCOSE SERPL-MCNC: 102 MG/DL (ref 70–110)
HCT VFR BLD AUTO: 29.8 % (ref 37–48.5)
HGB BLD-MCNC: 9.1 G/DL (ref 12–16)
IMM GRANULOCYTES # BLD AUTO: 0.05 K/UL (ref 0–0.04)
IMM GRANULOCYTES NFR BLD AUTO: 1.2 % (ref 0–0.5)
LYMPHOCYTES # BLD AUTO: 0.7 K/UL (ref 1–4.8)
LYMPHOCYTES NFR BLD: 16 % (ref 18–48)
MCH RBC QN AUTO: 28.3 PG (ref 27–31)
MCHC RBC AUTO-ENTMCNC: 30.5 G/DL (ref 32–36)
MCV RBC AUTO: 93 FL (ref 82–98)
MONOCYTES # BLD AUTO: 0.2 K/UL (ref 0.3–1)
MONOCYTES NFR BLD: 3.8 % (ref 4–15)
NEUTROPHILS # BLD AUTO: 3.3 K/UL (ref 1.8–7.7)
NEUTROPHILS NFR BLD: 78.8 % (ref 38–73)
NRBC BLD-RTO: 0 /100 WBC
PLATELET # BLD AUTO: 256 K/UL (ref 150–350)
PMV BLD AUTO: 11.1 FL (ref 9.2–12.9)
POTASSIUM SERPL-SCNC: 3.5 MMOL/L (ref 3.5–5.1)
PROT SERPL-MCNC: 8.5 G/DL (ref 6–8.4)
RBC # BLD AUTO: 3.21 M/UL (ref 4–5.4)
SODIUM SERPL-SCNC: 134 MMOL/L (ref 136–145)
WBC # BLD AUTO: 4.24 K/UL (ref 3.9–12.7)

## 2021-03-23 PROCEDURE — 85651 RBC SED RATE NONAUTOMATED: CPT | Performed by: INTERNAL MEDICINE

## 2021-03-23 PROCEDURE — 86160 COMPLEMENT ANTIGEN: CPT | Mod: 59 | Performed by: INTERNAL MEDICINE

## 2021-03-23 PROCEDURE — 86140 C-REACTIVE PROTEIN: CPT | Performed by: INTERNAL MEDICINE

## 2021-03-23 PROCEDURE — 36415 COLL VENOUS BLD VENIPUNCTURE: CPT | Mod: PO | Performed by: INTERNAL MEDICINE

## 2021-03-23 PROCEDURE — 85025 COMPLETE CBC W/AUTO DIFF WBC: CPT | Performed by: INTERNAL MEDICINE

## 2021-03-23 PROCEDURE — 86160 COMPLEMENT ANTIGEN: CPT | Performed by: INTERNAL MEDICINE

## 2021-03-23 PROCEDURE — 80053 COMPREHEN METABOLIC PANEL: CPT | Performed by: INTERNAL MEDICINE

## 2021-03-24 ENCOUNTER — TELEPHONE (OUTPATIENT)
Dept: RHEUMATOLOGY | Facility: CLINIC | Age: 21
End: 2021-03-24

## 2021-03-25 ENCOUNTER — DOCUMENTATION ONLY (OUTPATIENT)
Dept: INFUSION THERAPY | Facility: HOSPITAL | Age: 21
End: 2021-03-25

## 2021-03-25 ENCOUNTER — OFFICE VISIT (OUTPATIENT)
Dept: RHEUMATOLOGY | Facility: CLINIC | Age: 21
End: 2021-03-25
Payer: OTHER GOVERNMENT

## 2021-03-25 ENCOUNTER — TELEPHONE (OUTPATIENT)
Dept: RHEUMATOLOGY | Facility: CLINIC | Age: 21
End: 2021-03-25

## 2021-03-25 VITALS
BODY MASS INDEX: 30.04 KG/M2 | WEIGHT: 169.56 LBS | HEART RATE: 124 BPM | DIASTOLIC BLOOD PRESSURE: 76 MMHG | HEIGHT: 63 IN | SYSTOLIC BLOOD PRESSURE: 130 MMHG

## 2021-03-25 DIAGNOSIS — D84.9 IMMUNOSUPPRESSED STATUS: ICD-10-CM

## 2021-03-25 DIAGNOSIS — I99.9 VASCULOPATHY: ICD-10-CM

## 2021-03-25 DIAGNOSIS — M32.19 OTHER SYSTEMIC LUPUS ERYTHEMATOSUS WITH OTHER ORGAN INVOLVEMENT: ICD-10-CM

## 2021-03-25 DIAGNOSIS — L97.921 ULCERS OF BOTH LOWER EXTREMITIES, LIMITED TO BREAKDOWN OF SKIN: ICD-10-CM

## 2021-03-25 DIAGNOSIS — M32.19 LUPUS VASCULITIS: Primary | ICD-10-CM

## 2021-03-25 DIAGNOSIS — M06.09 RHEUMATOID ARTHRITIS OF MULTIPLE SITES WITH NEGATIVE RHEUMATOID FACTOR: ICD-10-CM

## 2021-03-25 DIAGNOSIS — L93.0 DISCOID LUPUS ERYTHEMATOSUS: ICD-10-CM

## 2021-03-25 DIAGNOSIS — L97.911 ULCERS OF BOTH LOWER EXTREMITIES, LIMITED TO BREAKDOWN OF SKIN: ICD-10-CM

## 2021-03-25 DIAGNOSIS — M32.9 SYSTEMIC LUPUS ERYTHEMATOSUS ARTHRITIS: ICD-10-CM

## 2021-03-25 DIAGNOSIS — I77.89 LUPUS VASCULITIS: Primary | ICD-10-CM

## 2021-03-25 PROBLEM — M32.8 OTHER FORMS OF SYSTEMIC LUPUS ERYTHEMATOSUS: Status: ACTIVE | Noted: 2021-03-25

## 2021-03-25 PROCEDURE — 99999 PR PBB SHADOW E&M-EST. PATIENT-LVL III: CPT | Mod: PBBFAC,,, | Performed by: INTERNAL MEDICINE

## 2021-03-25 PROCEDURE — 99999 PR PBB SHADOW E&M-EST. PATIENT-LVL III: ICD-10-PCS | Mod: PBBFAC,,, | Performed by: INTERNAL MEDICINE

## 2021-03-25 PROCEDURE — 99215 PR OFFICE/OUTPT VISIT, EST, LEVL V, 40-54 MIN: ICD-10-PCS | Mod: S$PBB,,, | Performed by: INTERNAL MEDICINE

## 2021-03-25 PROCEDURE — 99215 OFFICE O/P EST HI 40 MIN: CPT | Mod: S$PBB,,, | Performed by: INTERNAL MEDICINE

## 2021-03-25 PROCEDURE — 99213 OFFICE O/P EST LOW 20 MIN: CPT | Mod: PBBFAC | Performed by: INTERNAL MEDICINE

## 2021-03-25 RX ORDER — MYCOPHENOLATE MOFETIL 500 MG/1
1000 TABLET ORAL 2 TIMES DAILY
Qty: 120 TABLET | Refills: 2 | Status: SHIPPED | OUTPATIENT
Start: 2021-03-25

## 2021-03-25 RX ORDER — PREDNISONE 20 MG/1
20 TABLET ORAL DAILY
Qty: 30 TABLET | Refills: 2 | Status: SHIPPED | OUTPATIENT
Start: 2021-03-25

## 2021-03-25 RX ORDER — CILOSTAZOL 50 MG/1
50 TABLET ORAL 2 TIMES DAILY
Qty: 60 TABLET | Refills: 11 | Status: SHIPPED | OUTPATIENT
Start: 2021-03-25 | End: 2022-03-25

## 2021-03-25 RX ORDER — HYDROXYCHLOROQUINE SULFATE 200 MG/1
200 TABLET, FILM COATED ORAL 2 TIMES DAILY
Qty: 60 TABLET | Refills: 6 | Status: SHIPPED | OUTPATIENT
Start: 2021-03-25

## 2021-03-29 ENCOUNTER — SPECIALTY PHARMACY (OUTPATIENT)
Dept: PHARMACY | Facility: CLINIC | Age: 21
End: 2021-03-29

## 2021-03-29 ENCOUNTER — PATIENT MESSAGE (OUTPATIENT)
Dept: INFUSION THERAPY | Facility: HOSPITAL | Age: 21
End: 2021-03-29

## 2021-04-07 ENCOUNTER — TELEPHONE (OUTPATIENT)
Dept: INFUSION THERAPY | Facility: HOSPITAL | Age: 21
End: 2021-04-07

## 2021-04-07 ENCOUNTER — PATIENT MESSAGE (OUTPATIENT)
Dept: INFUSION THERAPY | Facility: HOSPITAL | Age: 21
End: 2021-04-07

## 2021-04-23 ENCOUNTER — SPECIALTY PHARMACY (OUTPATIENT)
Dept: PHARMACY | Facility: CLINIC | Age: 21
End: 2021-04-23

## 2021-05-13 ENCOUNTER — PATIENT OUTREACH (OUTPATIENT)
Dept: ADMINISTRATIVE | Facility: HOSPITAL | Age: 21
End: 2021-05-13

## 2021-05-22 ENCOUNTER — PATIENT MESSAGE (OUTPATIENT)
Dept: PHARMACY | Facility: CLINIC | Age: 21
End: 2021-05-22

## 2021-05-24 ENCOUNTER — SPECIALTY PHARMACY (OUTPATIENT)
Dept: PHARMACY | Facility: CLINIC | Age: 21
End: 2021-05-24

## 2021-05-27 ENCOUNTER — PATIENT MESSAGE (OUTPATIENT)
Dept: INFUSION THERAPY | Facility: HOSPITAL | Age: 21
End: 2021-05-27

## 2021-06-04 ENCOUNTER — PATIENT OUTREACH (OUTPATIENT)
Dept: ADMINISTRATIVE | Facility: HOSPITAL | Age: 21
End: 2021-06-04

## 2021-06-04 ENCOUNTER — PATIENT MESSAGE (OUTPATIENT)
Dept: ADMINISTRATIVE | Facility: HOSPITAL | Age: 21
End: 2021-06-04

## 2021-06-09 ENCOUNTER — TELEPHONE (OUTPATIENT)
Dept: RHEUMATOLOGY | Facility: CLINIC | Age: 21
End: 2021-06-09

## 2021-06-16 ENCOUNTER — PATIENT MESSAGE (OUTPATIENT)
Dept: PHARMACY | Facility: CLINIC | Age: 21
End: 2021-06-16

## 2021-06-21 ENCOUNTER — TELEPHONE (OUTPATIENT)
Dept: RHEUMATOLOGY | Facility: CLINIC | Age: 21
End: 2021-06-21

## 2022-03-16 ENCOUNTER — PATIENT OUTREACH (OUTPATIENT)
Dept: ADMINISTRATIVE | Facility: HOSPITAL | Age: 22
End: 2022-03-16
Payer: OTHER GOVERNMENT

## 2022-05-02 ENCOUNTER — PATIENT MESSAGE (OUTPATIENT)
Dept: ADMINISTRATIVE | Facility: HOSPITAL | Age: 22
End: 2022-05-02
Payer: OTHER GOVERNMENT

## 2022-08-04 ENCOUNTER — PATIENT MESSAGE (OUTPATIENT)
Dept: ADMINISTRATIVE | Facility: HOSPITAL | Age: 22
End: 2022-08-04
Payer: OTHER GOVERNMENT

## 2023-01-25 ENCOUNTER — PATIENT MESSAGE (OUTPATIENT)
Dept: ADMINISTRATIVE | Facility: HOSPITAL | Age: 23
End: 2023-01-25
Payer: OTHER GOVERNMENT

## 2024-01-24 NOTE — TELEPHONE ENCOUNTER
Scheduled pt 2 mo f/u with Dr. MAURICIO for 6-1-20, labs a week prior on 5-20-20. Pt verbalized understanding.    Penicillin/reactions to medicines

## 2024-11-19 ENCOUNTER — PATIENT MESSAGE (OUTPATIENT)
Dept: GASTROENTEROLOGY | Facility: CLINIC | Age: 24
End: 2024-11-19
Payer: OTHER GOVERNMENT